# Patient Record
Sex: MALE | Race: WHITE | NOT HISPANIC OR LATINO | Employment: UNEMPLOYED | URBAN - METROPOLITAN AREA
[De-identification: names, ages, dates, MRNs, and addresses within clinical notes are randomized per-mention and may not be internally consistent; named-entity substitution may affect disease eponyms.]

---

## 2017-05-03 LAB
ALBUMIN SERPL-MCNC: 4.2 G/DL (ref 3.6–5.1)
ALBUMIN/GLOB SERPL: ABNORMAL {RATIO} (ref 1–2.1)
ALP SERPL-CCNC: 63 UNITS/L (ref 40–115)
ALT SERPL-CCNC: 28 UNITS/L (ref 9–60)
AST SERPL-CCNC: 30 UNITS/L (ref 10–35)
BASOPHILS # BLD AUTO: ABNORMAL 10*3/MM3 (ref 0–200)
BASOPHILS NFR BLD AUTO: 1 %
BILIRUB SERPL-MCNC: 0.6 MG/DL (ref 0.2–1.2)
BUN SERPL-MCNC: 14 MG/DL (ref 7–25)
BUN/CREAT SERPL: ABNORMAL (ref 6–22)
CALCIUM SERPL-MCNC: 9.7 MG/DL (ref 8.6–10.2)
CHLORIDE SERPL-SCNC: 99 MMOL/L (ref 98–110)
CHOLEST SERPL-MCNC: 164 MG/DL (ref 125–200)
CHOLEST/HDLC SERPL: ABNORMAL {RATIO}
CONV CO2: 28 MMOL/L (ref 21–33)
CONV NEUTROPHILS/100 LEUKOCYTES IN BODY FLUID BY MANUAL COUNT: 58 %
CONV TOTAL PROTEIN: 7.4 G/DL (ref 6.2–8.3)
CREAT UR-MCNC: 0.9 MG/DL (ref 0.76–1.46)
EOSINOPHIL # BLD AUTO: 4 %
EOSINOPHIL # BLD AUTO: ABNORMAL 10*3/MM3 (ref 15–500)
ERYTHROCYTE [DISTWIDTH] IN BLOOD BY AUTOMATED COUNT: 14.3 % (ref 11–15)
GLOBULIN UR ELPH-MCNC: ABNORMAL G/DL (ref 2.1–3.7)
GLUCOSE SERPL-MCNC: 96 MG/DL (ref 65–99)
HCT VFR BLD AUTO: 47.1 % (ref 38.5–50)
HDLC SERPL-MCNC: 40 MG/DL
HGB BLD-MCNC: 15.8 G/DL (ref 13.2–17.1)
LDLC SERPL CALC-MCNC: 104 MG/DL
LYMPHOCYTES # BLD AUTO: ABNORMAL 10*3/MM3 (ref 850–3900)
LYMPHOCYTES NFR BLD AUTO: 30 %
MCH RBC QN AUTO: 29.6 PG (ref 27–33)
MCHC RBC AUTO-ENTMCNC: ABNORMAL % (ref 32–36)
MCV RBC AUTO: 88.2 FL (ref 80–100)
MONOCYTES # BLD AUTO: ABNORMAL 10*3/MICROLITER (ref 200–950)
MONOCYTES NFR BLD AUTO: 6 %
NEUTROPHILS # BLD AUTO: ABNORMAL 10*3/MM3 (ref 1500–7800)
PLATELET # BLD AUTO: ABNORMAL 10*3/MM3 (ref 140–400)
PMV BLD AUTO: 8.6 FL (ref 7.5–11.5)
POTASSIUM SERPL-SCNC: 5 MMOL/L (ref 3.5–5.3)
PSA SERPL-MCNC: 0.3 NG/ML
RBC # BLD AUTO: ABNORMAL 10*6/MM3 (ref 4.2–5.8)
SODIUM SERPL-SCNC: 137 MMOL/L (ref 135–146)
TRIGL SERPL-MCNC: 102 MG/DL
TSH SERPL-ACNC: 1.37 MIU/L (ref 0.4–4.5)
VIT B12 SERPL-MCNC: 473 PG/ML (ref 200–1100)
WBC # BLD AUTO: ABNORMAL 10*3/MM3 (ref 3.8–10.8)

## 2018-01-03 ENCOUNTER — HOSPITAL ENCOUNTER (OUTPATIENT)
Dept: FAMILY MEDICINE CLINIC | Facility: CLINIC | Age: 53
Discharge: HOME | End: 2018-01-03
Attending: NURSE PRACTITIONER | Admitting: NURSE PRACTITIONER

## 2018-11-07 LAB
ALBUMIN SERPL-MCNC: 3.9 G/DL (ref 3.6–5.1)
ALBUMIN/GLOB SERPL: ABNORMAL {RATIO} (ref 1–2.1)
ALP SERPL-CCNC: 56 UNITS/L (ref 40–115)
ALT SERPL-CCNC: 25 UNITS/L (ref 9–60)
AST SERPL-CCNC: 30 UNITS/L (ref 10–35)
BASOPHILS # BLD AUTO: ABNORMAL 10*3/MM3 (ref 0–200)
BASOPHILS NFR BLD AUTO: 1 %
BILIRUB SERPL-MCNC: 0.4 MG/DL (ref 0.2–1.2)
BUN SERPL-MCNC: 10 MG/DL (ref 7–25)
BUN/CREAT SERPL: ABNORMAL (ref 6–22)
CALCIUM SERPL-MCNC: 9.4 MG/DL (ref 8.6–10.2)
CHLORIDE SERPL-SCNC: 103 MMOL/L (ref 98–110)
CHOLEST SERPL-MCNC: 150 MG/DL (ref 125–200)
CHOLEST/HDLC SERPL: ABNORMAL {RATIO}
CONV % HGB A1C: ABNORMAL %
CONV CO2: 28 MMOL/L (ref 21–33)
CONV NEUTROPHILS/100 LEUKOCYTES IN BODY FLUID BY MANUAL COUNT: 52 %
CONV TOTAL PROTEIN: 6.7 G/DL (ref 6.2–8.3)
CREAT UR-MCNC: 0.9 MG/DL (ref 0.76–1.46)
EOSINOPHIL # BLD AUTO: 6 %
EOSINOPHIL # BLD AUTO: ABNORMAL 10*3/MM3 (ref 15–500)
ERYTHROCYTE [DISTWIDTH] IN BLOOD BY AUTOMATED COUNT: 15 % (ref 11–15)
GLOBULIN UR ELPH-MCNC: ABNORMAL G/DL (ref 2.1–3.7)
GLUCOSE SERPL-MCNC: 91 MG/DL (ref 65–99)
HCT VFR BLD AUTO: 41.7 % (ref 38.5–50)
HDLC SERPL-MCNC: 41 MG/DL
HGB BLD-MCNC: 14.1 G/DL (ref 13.2–17.1)
LDLC SERPL CALC-MCNC: 94 MG/DL
LYMPHOCYTES # BLD AUTO: ABNORMAL 10*3/MM3 (ref 850–3900)
LYMPHOCYTES NFR BLD AUTO: 32 %
MCH RBC QN AUTO: 30.7 PG (ref 27–33)
MCHC RBC AUTO-ENTMCNC: ABNORMAL % (ref 32–36)
MCV RBC AUTO: 90.8 FL (ref 80–100)
MONOCYTES # BLD AUTO: ABNORMAL 10*3/MICROLITER (ref 200–950)
MONOCYTES NFR BLD AUTO: 9 %
NEUTROPHILS # BLD AUTO: ABNORMAL 10*3/MM3 (ref 1500–7800)
PLATELET # BLD AUTO: ABNORMAL 10*3/MM3 (ref 140–400)
PMV BLD AUTO: 8.4 FL (ref 7.5–11.5)
POTASSIUM SERPL-SCNC: 4.4 MMOL/L (ref 3.5–5.3)
PSA SERPL-MCNC: 0.2 NG/ML
RBC # BLD AUTO: ABNORMAL 10*6/MM3 (ref 4.2–5.8)
SODIUM SERPL-SCNC: 138 MMOL/L (ref 135–146)
TRIGL SERPL-MCNC: 75 MG/DL
WBC # BLD AUTO: ABNORMAL 10*3/MM3 (ref 3.8–10.8)

## 2019-02-06 ENCOUNTER — HOSPITAL ENCOUNTER (OUTPATIENT)
Dept: FAMILY MEDICINE CLINIC | Facility: CLINIC | Age: 54
Discharge: HOME | End: 2019-02-06
Attending: NURSE PRACTITIONER | Admitting: NURSE PRACTITIONER

## 2019-05-13 ENCOUNTER — CONVERSION ENCOUNTER (OUTPATIENT)
Dept: FAMILY MEDICINE CLINIC | Facility: CLINIC | Age: 54
End: 2019-05-13

## 2019-05-13 LAB
ALBUMIN SERPL-MCNC: 4 G/DL (ref 3.6–5.1)
ALBUMIN/GLOB SERPL: ABNORMAL {RATIO} (ref 1–2.5)
ALP SERPL-CCNC: 58 UNITS/L (ref 40–115)
ALT SERPL-CCNC: 20 UNITS/L (ref 9–46)
AST SERPL-CCNC: 21 UNITS/L (ref 10–35)
BASOPHILS # BLD AUTO: ABNORMAL 10*3/MM3 (ref 0–200)
BASOPHILS NFR BLD AUTO: 1.2 %
BILIRUB SERPL-MCNC: 0.6 MG/DL (ref 0.2–1.2)
BUN SERPL-MCNC: 9 MG/DL (ref 7–25)
BUN/CREAT SERPL: ABNORMAL (ref 6–22)
CALCIUM SERPL-MCNC: 9.3 MG/DL (ref 8.6–10.3)
CHLORIDE SERPL-SCNC: 97 MMOL/L (ref 98–110)
CHOLEST SERPL-MCNC: 156 MG/DL
CHOLEST/HDLC SERPL: ABNORMAL {RATIO}
CO2 CONTENT VENOUS: 28 MMOL/L (ref 20–32)
CONV NEUTROPHILS/100 LEUKOCYTES IN BODY FLUID BY MANUAL COUNT: 57.2 %
CONV TOTAL PROTEIN: 6.8 G/DL (ref 6.1–8.1)
CREAT UR-MCNC: 0.69 MG/DL (ref 0.7–1.33)
EOSINOPHIL # BLD AUTO: 5.9 %
EOSINOPHIL # BLD AUTO: ABNORMAL 10*3/MM3 (ref 15–500)
ERYTHROCYTE [DISTWIDTH] IN BLOOD BY AUTOMATED COUNT: 14 % (ref 11–15)
GLOBULIN UR ELPH-MCNC: ABNORMAL G/DL (ref 1.9–3.7)
GLUCOSE SERPL-MCNC: 87 MG/DL (ref 65–99)
HCT VFR BLD AUTO: 41.7 % (ref 38.5–50)
HDLC SERPL-MCNC: 44 MG/DL
HGB BLD-MCNC: 14.4 G/DL (ref 13.2–17.1)
LDLC SERPL CALC-MCNC: ABNORMAL MG/DL
LYMPHOCYTES # BLD AUTO: ABNORMAL 10*3/MM3 (ref 850–3900)
LYMPHOCYTES NFR BLD AUTO: 29.4 %
MCH RBC QN AUTO: 29.8 PG (ref 27–33)
MCHC RBC AUTO-ENTMCNC: ABNORMAL % (ref 32–36)
MCV RBC AUTO: 86.3 FL (ref 80–100)
MONOCYTES # BLD AUTO: ABNORMAL 10*3/MICROLITER (ref 200–950)
MONOCYTES NFR BLD AUTO: 6.3 %
NEUTROPHILS # BLD AUTO: ABNORMAL 10*3/MM3 (ref 1500–7800)
PLATELET # BLD AUTO: ABNORMAL 10*3/MM3 (ref 140–400)
PMV BLD AUTO: 9.8 FL (ref 7.5–12.5)
POTASSIUM SERPL-SCNC: 4.1 MMOL/L (ref 3.5–5.3)
RBC # BLD AUTO: ABNORMAL 10*6/MM3 (ref 4.2–5.8)
SODIUM SERPL-SCNC: 132 MMOL/L (ref 135–146)
TRIGL SERPL-MCNC: 84 MG/DL
WBC # BLD AUTO: ABNORMAL K/UL (ref 3.8–10.8)

## 2019-06-27 RX ORDER — PSEUDOEPHEDRINE HCL 120 MG/1
120 TABLET, FILM COATED, EXTENDED RELEASE ORAL EVERY 12 HOURS
Qty: 60 TABLET | Refills: 2 | Status: SHIPPED | OUTPATIENT
Start: 2019-06-27

## 2019-07-10 RX ORDER — LISINOPRIL AND HYDROCHLOROTHIAZIDE 20; 12.5 MG/1; MG/1
1 TABLET ORAL DAILY
COMMUNITY
Start: 2018-04-05 | End: 2019-07-10 | Stop reason: SDUPTHER

## 2019-07-10 RX ORDER — LISINOPRIL AND HYDROCHLOROTHIAZIDE 20; 12.5 MG/1; MG/1
1 TABLET ORAL DAILY
Qty: 30 TABLET | Refills: 2 | Status: SHIPPED | OUTPATIENT
Start: 2019-07-10 | End: 2019-10-30 | Stop reason: SDUPTHER

## 2019-07-10 RX ORDER — ESCITALOPRAM OXALATE 20 MG/1
1 TABLET ORAL DAILY
COMMUNITY
Start: 2018-06-25 | End: 2019-07-10 | Stop reason: SDUPTHER

## 2019-07-10 RX ORDER — ESCITALOPRAM OXALATE 20 MG/1
20 TABLET ORAL DAILY
Qty: 30 TABLET | Refills: 2 | Status: SHIPPED | OUTPATIENT
Start: 2019-07-10 | End: 2019-10-30 | Stop reason: SDUPTHER

## 2019-10-30 RX ORDER — ESCITALOPRAM OXALATE 20 MG/1
TABLET ORAL
Qty: 90 TABLET | Refills: 1 | Status: SHIPPED | OUTPATIENT
Start: 2019-10-30

## 2019-10-30 RX ORDER — LISINOPRIL AND HYDROCHLOROTHIAZIDE 20; 12.5 MG/1; MG/1
TABLET ORAL
Qty: 90 TABLET | Refills: 1 | Status: SHIPPED | OUTPATIENT
Start: 2019-10-30

## 2021-10-01 ENCOUNTER — TRANSCRIBE ORDERS (OUTPATIENT)
Dept: ADMINISTRATIVE | Facility: HOSPITAL | Age: 56
End: 2021-10-01

## 2021-10-01 DIAGNOSIS — R06.02 SHORTNESS OF BREATH: Primary | ICD-10-CM

## 2021-10-08 ENCOUNTER — TRANSCRIBE ORDERS (OUTPATIENT)
Dept: ADMINISTRATIVE | Facility: HOSPITAL | Age: 56
End: 2021-10-08

## 2021-10-08 DIAGNOSIS — Z01.818 OTHER SPECIFIED PRE-OPERATIVE EXAMINATION: Primary | ICD-10-CM

## 2021-10-30 ENCOUNTER — APPOINTMENT (OUTPATIENT)
Dept: RESPIRATORY THERAPY | Facility: HOSPITAL | Age: 56
End: 2021-10-30

## 2021-11-18 ENCOUNTER — APPOINTMENT (OUTPATIENT)
Dept: RESPIRATORY THERAPY | Facility: HOSPITAL | Age: 56
End: 2021-11-18

## 2021-12-07 ENCOUNTER — HOSPITAL ENCOUNTER (OUTPATIENT)
Dept: RESPIRATORY THERAPY | Facility: HOSPITAL | Age: 56
End: 2021-12-07

## 2022-06-30 ENCOUNTER — TRANSCRIBE ORDERS (OUTPATIENT)
Dept: PULMONOLOGY | Facility: HOSPITAL | Age: 57
End: 2022-06-30

## 2022-06-30 DIAGNOSIS — R06.00 DYSPNEA, UNSPECIFIED TYPE: Primary | ICD-10-CM

## 2022-08-22 ENCOUNTER — LAB (OUTPATIENT)
Dept: LAB | Facility: HOSPITAL | Age: 57
End: 2022-08-22

## 2022-08-22 DIAGNOSIS — Z01.818 OTHER SPECIFIED PRE-OPERATIVE EXAMINATION: ICD-10-CM

## 2022-08-22 PROCEDURE — 0202U NFCT DS 22 TRGT SARS-COV-2: CPT

## 2022-08-22 PROCEDURE — C9803 HOPD COVID-19 SPEC COLLECT: HCPCS

## 2022-08-24 ENCOUNTER — HOSPITAL ENCOUNTER (OUTPATIENT)
Dept: RESPIRATORY THERAPY | Facility: HOSPITAL | Age: 57
Discharge: HOME | End: 2022-08-24
Admitting: INTERNAL MEDICINE

## 2022-08-24 DIAGNOSIS — R06.00 DYSPNEA, UNSPECIFIED TYPE: ICD-10-CM

## 2022-08-24 PROCEDURE — 94729 DIFFUSING CAPACITY: CPT

## 2022-08-24 PROCEDURE — 94618 PULMONARY STRESS TESTING: CPT

## 2022-08-24 PROCEDURE — 94727 GAS DIL/WSHOT DETER LNG VOL: CPT

## 2022-08-24 PROCEDURE — 94060 EVALUATION OF WHEEZING: CPT

## 2022-08-24 RX ORDER — ALBUTEROL SULFATE 90 UG/1
2 AEROSOL, METERED RESPIRATORY (INHALATION) ONCE
Status: COMPLETED | OUTPATIENT
Start: 2022-08-24 | End: 2022-08-24

## 2022-08-24 RX ADMIN — ALBUTEROL SULFATE 2 PUFF: 108 INHALANT RESPIRATORY (INHALATION) at 08:21

## 2022-08-24 NOTE — NURSING NOTE
Exercise Oximetry    Patient Name:Chu Peralta   MRN: [id number]   Date: 08/24/22             ROOM AIR BASELINE   SpO2% 97   Heart Rate 76   Blood Pressure      EXERCISE ON ROOM AIR SpO2% EXERCISE ON O2 @  LPM SpO2%   1 MINUTE 96 1 MINUTE    2 MINUTES 95 2 MINUTES    3 MINUTES 94 3 MINUTES    4 MINUTES 95 4 MINUTES    5 MINUTES 95 5 MINUTES    6 MINUTES 94 6 MINUTES               Distance Walked   Distance Walked   Dyspnea (Ita Scale)   Dyspnea (Ita Scale)   Fatigue (Ita Scale)   Fatigue (Ita Scale)   SpO2% Post Exercise  96 SpO2% Post Exercise   HR Post Exercise  72 HR Post Exercise   Time to Recovery  ONE MINUTE Time to Recovery     Comments: PATIENT WALKED ON ROOM AIR, O2 SATS REMAINED 94% AND ABOVE THRU-OUT THE WALK

## 2023-01-20 ENCOUNTER — TRANSCRIBE ORDERS (OUTPATIENT)
Dept: ADMINISTRATIVE | Facility: HOSPITAL | Age: 58
End: 2023-01-20
Payer: MEDICAID

## 2023-01-20 DIAGNOSIS — Z02.71 ENCOUNTER FOR DISABILITY DETERMINATION: Primary | ICD-10-CM

## 2023-03-09 ENCOUNTER — HOSPITAL ENCOUNTER (OUTPATIENT)
Dept: RESPIRATORY THERAPY | Facility: HOSPITAL | Age: 58
Discharge: HOME | End: 2023-03-09

## 2023-03-09 VITALS — OXYGEN SATURATION: 96 % | RESPIRATION RATE: 12 BRPM | HEART RATE: 86 BPM

## 2023-03-09 DIAGNOSIS — Z02.71 ENCOUNTER FOR DISABILITY DETERMINATION: ICD-10-CM

## 2023-03-09 PROCEDURE — 94060 EVALUATION OF WHEEZING: CPT

## 2023-03-09 RX ORDER — ALBUTEROL SULFATE 90 UG/1
2 AEROSOL, METERED RESPIRATORY (INHALATION) ONCE
Status: COMPLETED | OUTPATIENT
Start: 2023-03-09 | End: 2023-03-09

## 2023-03-09 RX ADMIN — ALBUTEROL SULFATE 2 PUFF: 108 INHALANT RESPIRATORY (INHALATION) at 13:10

## 2023-09-12 ENCOUNTER — TRANSCRIBE ORDERS (OUTPATIENT)
Dept: ADMINISTRATIVE | Facility: HOSPITAL | Age: 58
End: 2023-09-12
Payer: MEDICAID

## 2023-09-12 DIAGNOSIS — Z02.71 ENCOUNTER FOR DISABILITY DETERMINATION: Primary | ICD-10-CM

## 2023-09-26 ENCOUNTER — HOSPITAL ENCOUNTER (OUTPATIENT)
Dept: RESPIRATORY THERAPY | Facility: HOSPITAL | Age: 58
Discharge: HOME | End: 2023-09-26

## 2023-09-26 ENCOUNTER — TRANSCRIBE ORDERS (OUTPATIENT)
Dept: ADMINISTRATIVE | Facility: HOSPITAL | Age: 58
End: 2023-09-26
Payer: MEDICAID

## 2023-09-26 VITALS — HEART RATE: 78 BPM | OXYGEN SATURATION: 97 % | RESPIRATION RATE: 16 BRPM

## 2023-09-26 DIAGNOSIS — Z02.71 ENCOUNTER FOR DISABILITY DETERMINATION: Primary | ICD-10-CM

## 2023-09-26 DIAGNOSIS — Z02.71 ENCOUNTER FOR DISABILITY DETERMINATION: ICD-10-CM

## 2023-09-26 LAB
ARTERIAL PATENCY WRIST A: POSITIVE
ATMOSPHERIC PRESS: ABNORMAL MM[HG]
BASE EXCESS BLDA CALC-SCNC: 1.5 MMOL/L (ref 0–3)
BDY SITE: ABNORMAL
CO2 BLDA-SCNC: 27.3 MMOL/L (ref 22–29)
HCO3 BLDA-SCNC: 26.1 MMOL/L (ref 21–28)
HEMODILUTION: NO
MODALITY: ABNORMAL
PCO2 BLDA: 40.2 MM HG (ref 35–48)
PH BLDA: 7.42 PH UNITS (ref 7.35–7.45)
PO2 BLDA: 71.8 MM HG (ref 83–108)
SAO2 % BLDCOA: 94.5 % (ref 94–98)

## 2023-09-26 PROCEDURE — 82803 BLOOD GASES ANY COMBINATION: CPT

## 2023-09-26 PROCEDURE — 94760 N-INVAS EAR/PLS OXIMETRY 1: CPT

## 2023-09-26 PROCEDURE — 94729 DIFFUSING CAPACITY: CPT

## 2023-09-26 PROCEDURE — 36600 WITHDRAWAL OF ARTERIAL BLOOD: CPT

## 2023-09-26 PROCEDURE — 94060 EVALUATION OF WHEEZING: CPT

## 2023-09-26 PROCEDURE — 94664 DEMO&/EVAL PT USE INHALER: CPT

## 2023-09-26 PROCEDURE — 94799 UNLISTED PULMONARY SVC/PX: CPT

## 2023-09-26 RX ORDER — ALBUTEROL SULFATE 90 UG/1
2 AEROSOL, METERED RESPIRATORY (INHALATION) ONCE
Status: COMPLETED | OUTPATIENT
Start: 2023-09-26 | End: 2023-09-26

## 2023-09-26 RX ADMIN — ALBUTEROL SULFATE 2 PUFF: 108 AEROSOL, METERED RESPIRATORY (INHALATION) at 13:59

## 2025-02-03 ENCOUNTER — HOSPITAL ENCOUNTER (INPATIENT)
Facility: HOSPITAL | Age: 60
LOS: 7 days | Discharge: HOME HEALTH | DRG: 321 | End: 2025-02-10
Attending: EMERGENCY MEDICINE | Admitting: EMERGENCY MEDICINE
Payer: MEDICARE

## 2025-02-03 ENCOUNTER — APPOINTMENT (OUTPATIENT)
Dept: GENERAL RADIOLOGY | Facility: HOSPITAL | Age: 60
DRG: 321 | End: 2025-02-03
Payer: MEDICARE

## 2025-02-03 ENCOUNTER — APPOINTMENT (OUTPATIENT)
Dept: CARDIOLOGY | Facility: HOSPITAL | Age: 60
DRG: 321 | End: 2025-02-03
Payer: MEDICARE

## 2025-02-03 DIAGNOSIS — J96.22 ACUTE ON CHRONIC RESPIRATORY FAILURE WITH HYPOXIA AND HYPERCAPNIA: Primary | ICD-10-CM

## 2025-02-03 DIAGNOSIS — J96.21 ACUTE ON CHRONIC RESPIRATORY FAILURE WITH HYPOXIA AND HYPERCAPNIA: Primary | ICD-10-CM

## 2025-02-03 PROBLEM — J96.20 ACUTE ON CHRONIC RESPIRATORY FAILURE: Status: ACTIVE | Noted: 2025-02-03

## 2025-02-03 PROBLEM — I21.11 STEMI INVOLVING RIGHT CORONARY ARTERY: Status: ACTIVE | Noted: 2025-02-03

## 2025-02-03 LAB
ACT BLD: 164 SECONDS (ref 89–137)
ACT BLD: 176 SECONDS (ref 89–137)
ACT BLD: 256 SECONDS (ref 89–137)
ALBUMIN SERPL-MCNC: 4 G/DL (ref 3.5–5.2)
ALBUMIN/GLOB SERPL: 1.4 G/DL
ALP SERPL-CCNC: 66 U/L (ref 39–117)
ALT SERPL W P-5'-P-CCNC: 30 U/L (ref 1–41)
AMPHET+METHAMPHET UR QL: NEGATIVE
AMPHETAMINES UR QL: NEGATIVE
AMYLASE SERPL-CCNC: 64 U/L (ref 28–100)
ANION GAP SERPL CALCULATED.3IONS-SCNC: 8.6 MMOL/L (ref 5–15)
AORTIC DIMENSIONLESS INDEX: 0.72 (DI)
APTT PPP: >200 SECONDS (ref 22.7–35.4)
ARTERIAL PATENCY WRIST A: ABNORMAL
ARTERIAL PATENCY WRIST A: POSITIVE
AST SERPL-CCNC: 84 U/L (ref 1–40)
ATMOSPHERIC PRESS: ABNORMAL MM[HG]
AV MEAN PRESS GRAD SYS DOP V1V2: 2 MMHG
AV VMAX SYS DOP: 88.2 CM/SEC
B PARAPERT DNA SPEC QL NAA+PROBE: NOT DETECTED
B PERT DNA SPEC QL NAA+PROBE: NOT DETECTED
BACTERIA UR QL AUTO: ABNORMAL /HPF
BARBITURATES UR QL SCN: NEGATIVE
BASE EXCESS BLDA CALC-SCNC: -3.6 MMOL/L (ref 0–3)
BASE EXCESS BLDA CALC-SCNC: -5.4 MMOL/L (ref 0–3)
BASE EXCESS BLDA CALC-SCNC: -5.7 MMOL/L (ref 0–3)
BASE EXCESS BLDA CALC-SCNC: -5.8 MMOL/L (ref 0–3)
BASOPHILS # BLD AUTO: 0.07 10*3/MM3 (ref 0–0.2)
BASOPHILS NFR BLD AUTO: 0.3 % (ref 0–1.5)
BDY SITE: ABNORMAL
BENZODIAZ UR QL SCN: NEGATIVE
BH CV ECHO MEAS - ACS: 2.3 CM
BH CV ECHO MEAS - AI P1/2T: 374.7 MSEC
BH CV ECHO MEAS - AO MAX PG: 3.1 MMHG
BH CV ECHO MEAS - AO V2 VTI: 13.7 CM
BH CV ECHO MEAS - AVA(I,D): 1.75 CM2
BH CV ECHO MEAS - EDV(CUBED): 103.8 ML
BH CV ECHO MEAS - EDV(MOD-SP4): 124 ML
BH CV ECHO MEAS - EF(MOD-SP4): 27.3 %
BH CV ECHO MEAS - ESV(CUBED): 68.9 ML
BH CV ECHO MEAS - ESV(MOD-SP4): 90.1 ML
BH CV ECHO MEAS - FS: 12.8 %
BH CV ECHO MEAS - IVS/LVPW: 1 CM
BH CV ECHO MEAS - IVSD: 1 CM
BH CV ECHO MEAS - LA DIMENSION: 3.3 CM
BH CV ECHO MEAS - LAT PEAK E' VEL: 5.2 CM/SEC
BH CV ECHO MEAS - LV MASS(C)D: 164.5 GRAMS
BH CV ECHO MEAS - LV MAX PG: 1.34 MMHG
BH CV ECHO MEAS - LV MEAN PG: 1 MMHG
BH CV ECHO MEAS - LV V1 MAX: 57.9 CM/SEC
BH CV ECHO MEAS - LV V1 VTI: 8.5 CM
BH CV ECHO MEAS - LVIDD: 4.7 CM
BH CV ECHO MEAS - LVIDS: 4.1 CM
BH CV ECHO MEAS - LVOT AREA: 2.8 CM2
BH CV ECHO MEAS - LVOT DIAM: 1.9 CM
BH CV ECHO MEAS - LVPWD: 1 CM
BH CV ECHO MEAS - MED PEAK E' VEL: 5.3 CM/SEC
BH CV ECHO MEAS - MR MAX PG: 18.5 MMHG
BH CV ECHO MEAS - MR MAX VEL: 215 CM/SEC
BH CV ECHO MEAS - MV A MAX VEL: 62 CM/SEC
BH CV ECHO MEAS - MV DEC SLOPE: 1020 CM/SEC2
BH CV ECHO MEAS - MV DEC TIME: 0.14 SEC
BH CV ECHO MEAS - MV E MAX VEL: 42.3 CM/SEC
BH CV ECHO MEAS - MV E/A: 0.68
BH CV ECHO MEAS - MV MAX PG: 1.54 MMHG
BH CV ECHO MEAS - MV MEAN PG: 1 MMHG
BH CV ECHO MEAS - MV P1/2T: 16.1 MSEC
BH CV ECHO MEAS - MV V2 VTI: 8.2 CM
BH CV ECHO MEAS - MVA(P1/2T): 13.7 CM2
BH CV ECHO MEAS - MVA(VTI): 2.9 CM2
BH CV ECHO MEAS - PA ACC TIME: 0.12 SEC
BH CV ECHO MEAS - PA V2 MAX: 57.6 CM/SEC
BH CV ECHO MEAS - RAP SYSTOLE: 3 MMHG
BH CV ECHO MEAS - RV MAX PG: 0.58 MMHG
BH CV ECHO MEAS - RV V1 MAX: 38.1 CM/SEC
BH CV ECHO MEAS - RV V1 VTI: 4.6 CM
BH CV ECHO MEAS - RVSP: 30.2 MMHG
BH CV ECHO MEAS - SV(LVOT): 24 ML
BH CV ECHO MEAS - SV(MOD-SP4): 33.9 ML
BH CV ECHO MEAS - TAPSE (>1.6): 1.56 CM
BH CV ECHO MEAS - TR MAX PG: 27.2 MMHG
BH CV ECHO MEAS - TR MAX VEL: 261 CM/SEC
BH CV ECHO MEASUREMENTS AVERAGE E/E' RATIO: 8.06
BH CV XLRA - TDI S': 12.3 CM/SEC
BILIRUB SERPL-MCNC: 0.2 MG/DL (ref 0–1.2)
BILIRUB UR QL STRIP: NEGATIVE
BUN SERPL-MCNC: 11 MG/DL (ref 8–23)
BUN/CREAT SERPL: 12.1 (ref 7–25)
BUPRENORPHINE SERPL-MCNC: NEGATIVE NG/ML
C PNEUM DNA NPH QL NAA+NON-PROBE: NOT DETECTED
CA-I BLDA-SCNC: 1.15 MMOL/L (ref 1.15–1.33)
CA-I BLDA-SCNC: 1.26 MMOL/L (ref 1.15–1.33)
CA-I SERPL ISE-MCNC: 1.14 MMOL/L (ref 1.15–1.3)
CALCIUM SPEC-SCNC: 8.7 MG/DL (ref 8.6–10.5)
CANNABINOIDS SERPL QL: NEGATIVE
CHLORIDE SERPL-SCNC: 101 MMOL/L (ref 98–107)
CHOLEST SERPL-MCNC: 111 MG/DL (ref 0–200)
CK SERPL-CCNC: 1076 U/L (ref 20–200)
CLARITY UR: CLEAR
CO2 BLDA-SCNC: 27.2 MMOL/L (ref 22–29)
CO2 BLDA-SCNC: 27.9 MMOL/L (ref 22–29)
CO2 SERPL-SCNC: 25.4 MMOL/L (ref 22–29)
COCAINE UR QL: NEGATIVE
COLOR UR: YELLOW
CREAT BLDA-MCNC: 0.89 MG/DL (ref 0.6–1.3)
CREAT BLDA-MCNC: 1.19 MG/DL (ref 0.6–1.3)
CREAT SERPL-MCNC: 0.91 MG/DL (ref 0.76–1.27)
D-LACTATE SERPL-SCNC: 1.4 MMOL/L (ref 0.2–2)
D-LACTATE SERPL-SCNC: 1.9 MMOL/L (ref 0.2–2)
D-LACTATE SERPL-SCNC: 1.9 MMOL/L (ref 0.5–2)
DEPRECATED RDW RBC AUTO: 49.1 FL (ref 37–54)
EGFRCR SERPLBLD CKD-EPI 2021: 69.5 ML/MIN/1.73
EGFRCR SERPLBLD CKD-EPI 2021: 95.9 ML/MIN/1.73
EGFRCR SERPLBLD CKD-EPI 2021: 97.5 ML/MIN/1.73
EOSINOPHIL # BLD AUTO: 0.01 10*3/MM3 (ref 0–0.4)
EOSINOPHIL NFR BLD AUTO: 0 % (ref 0.3–6.2)
ERYTHROCYTE [DISTWIDTH] IN BLOOD BY AUTOMATED COUNT: 14.4 % (ref 12.3–15.4)
FLUAV H1 2009 PAND RNA NPH QL NAA+PROBE: DETECTED
FLUBV RNA ISLT QL NAA+PROBE: NOT DETECTED
GLOBULIN UR ELPH-MCNC: 2.8 GM/DL
GLUCOSE BLDC GLUCOMTR-MCNC: 186 MG/DL (ref 74–100)
GLUCOSE BLDC GLUCOMTR-MCNC: 186 MG/DL (ref 74–100)
GLUCOSE BLDC GLUCOMTR-MCNC: 200 MG/DL (ref 74–100)
GLUCOSE BLDC GLUCOMTR-MCNC: 213 MG/DL (ref 74–100)
GLUCOSE BLDC GLUCOMTR-MCNC: 213 MG/DL (ref 74–100)
GLUCOSE SERPL-MCNC: 186 MG/DL (ref 65–99)
GLUCOSE UR STRIP-MCNC: ABNORMAL MG/DL
HADV DNA SPEC NAA+PROBE: NOT DETECTED
HBA1C MFR BLD: 6.06 % (ref 4.8–5.6)
HCO3 BLDA-SCNC: 25 MMOL/L (ref 21–28)
HCO3 BLDA-SCNC: 25.2 MMOL/L (ref 21–28)
HCO3 BLDA-SCNC: 25.5 MMOL/L (ref 21–28)
HCO3 BLDA-SCNC: 27.3 MMOL/L (ref 21–28)
HCOV 229E RNA SPEC QL NAA+PROBE: NOT DETECTED
HCOV HKU1 RNA SPEC QL NAA+PROBE: NOT DETECTED
HCOV NL63 RNA SPEC QL NAA+PROBE: NOT DETECTED
HCOV OC43 RNA SPEC QL NAA+PROBE: NOT DETECTED
HCT VFR BLD AUTO: 40.6 % (ref 37.5–51)
HCT VFR BLDA CALC: 39 % (ref 38–51)
HCT VFR BLDA CALC: 41 % (ref 38–51)
HDLC SERPL-MCNC: 35 MG/DL (ref 40–60)
HEMODILUTION: NO
HGB BLD-MCNC: 12.6 G/DL (ref 13–17.7)
HGB BLDA-MCNC: 13.2 G/DL (ref 12–17)
HGB BLDA-MCNC: 13.9 G/DL (ref 12–17)
HGB UR QL STRIP.AUTO: ABNORMAL
HMPV RNA NPH QL NAA+NON-PROBE: NOT DETECTED
HPIV1 RNA ISLT QL NAA+PROBE: NOT DETECTED
HPIV2 RNA SPEC QL NAA+PROBE: NOT DETECTED
HPIV3 RNA NPH QL NAA+PROBE: NOT DETECTED
HPIV4 P GENE NPH QL NAA+PROBE: NOT DETECTED
HYALINE CASTS UR QL AUTO: ABNORMAL /LPF
IMM GRANULOCYTES # BLD AUTO: 0.3 10*3/MM3 (ref 0–0.05)
IMM GRANULOCYTES NFR BLD AUTO: 1.2 % (ref 0–0.5)
INHALED O2 CONCENTRATION: 50 %
INR PPP: 1.21 (ref 0.9–1.1)
KETONES UR QL STRIP: NEGATIVE
L PNEUMO1 AG UR QL IA: NEGATIVE
LDLC SERPL CALC-MCNC: 64 MG/DL (ref 0–100)
LDLC/HDLC SERPL: 1.89 {RATIO}
LEUKOCYTE ESTERASE UR QL STRIP.AUTO: NEGATIVE
LIPASE SERPL-CCNC: 20 U/L (ref 13–60)
LV EF BIPLANE MOD: 27 %
LYMPHOCYTES # BLD AUTO: 0.4 10*3/MM3 (ref 0.7–3.1)
LYMPHOCYTES NFR BLD AUTO: 1.6 % (ref 19.6–45.3)
Lab: ABNORMAL
M PNEUMO IGG SER IA-ACNC: NOT DETECTED
MAGNESIUM SERPL-MCNC: 2.3 MG/DL (ref 1.6–2.4)
MCH RBC QN AUTO: 28.8 PG (ref 26.6–33)
MCHC RBC AUTO-ENTMCNC: 31 G/DL (ref 31.5–35.7)
MCV RBC AUTO: 92.7 FL (ref 79–97)
METHADONE UR QL SCN: NEGATIVE
MODALITY: ABNORMAL
MONOCYTES # BLD AUTO: 1.17 10*3/MM3 (ref 0.1–0.9)
MONOCYTES NFR BLD AUTO: 4.6 % (ref 5–12)
MRSA DNA SPEC QL NAA+PROBE: NORMAL
NEUTROPHILS NFR BLD AUTO: 23.69 10*3/MM3 (ref 1.7–7)
NEUTROPHILS NFR BLD AUTO: 92.3 % (ref 42.7–76)
NITRITE UR QL STRIP: NEGATIVE
NOTIFIED WHO: ABNORMAL
NRBC BLD AUTO-RTO: 0 /100 WBC (ref 0–0.2)
NT-PROBNP SERPL-MCNC: 771 PG/ML (ref 0–900)
OPIATES UR QL: NEGATIVE
OXYCODONE UR QL SCN: NEGATIVE
PCO2 BLDA: 71.3 MM HG (ref 35–48)
PCO2 BLDA: 78 MM HG (ref 35–48)
PCO2 BLDA: 78.1 MM HG (ref 35–48)
PCO2 BLDA: 78.8 MM HG (ref 35–48)
PCP UR QL SCN: NEGATIVE
PEEP RESPIRATORY: 5 CM[H2O]
PH BLDA: 7.12 PH UNITS (ref 7.35–7.45)
PH BLDA: 7.12 PH UNITS (ref 7.35–7.45)
PH BLDA: 7.15 PH UNITS (ref 7.35–7.45)
PH BLDA: 7.15 PH UNITS (ref 7.35–7.45)
PH UR STRIP.AUTO: 6.5 [PH] (ref 5–8)
PHOSPHATE SERPL-MCNC: 5.1 MG/DL (ref 2.5–4.5)
PLATELET # BLD AUTO: 336 10*3/MM3 (ref 140–450)
PMV BLD AUTO: 8.9 FL (ref 6–12)
PO2 BLD: 149 MM[HG] (ref 0–500)
PO2 BLD: 200 MM[HG] (ref 0–500)
PO2 BLD: 329 MM[HG] (ref 0–500)
PO2 BLD: 346 MM[HG] (ref 0–500)
PO2 BLDA: 100 MM HG (ref 83–108)
PO2 BLDA: 164.5 MM HG (ref 83–108)
PO2 BLDA: 172.9 MM HG (ref 83–108)
PO2 BLDA: 74.5 MM HG (ref 83–108)
POTASSIUM BLDA-SCNC: 5 MMOL/L (ref 3.5–4.5)
POTASSIUM BLDA-SCNC: 5.6 MMOL/L (ref 3.5–4.5)
POTASSIUM SERPL-SCNC: 5.1 MMOL/L (ref 3.5–5.2)
POTASSIUM SERPL-SCNC: 5.7 MMOL/L (ref 3.5–5.2)
PROCALCITONIN SERPL-MCNC: 37.4 NG/ML (ref 0–0.25)
PROT SERPL-MCNC: 6.8 G/DL (ref 6–8.5)
PROT UR QL STRIP: ABNORMAL
PROTHROMBIN TIME: 15.3 SECONDS (ref 11.7–14.2)
RBC # BLD AUTO: 4.38 10*6/MM3 (ref 4.14–5.8)
RBC # UR STRIP: ABNORMAL /HPF
READ BACK: ABNORMAL
REF LAB TEST METHOD: ABNORMAL
RESPIRATORY RATE: 20
RESPIRATORY RATE: 24
RESPIRATORY RATE: 28
RESPIRATORY RATE: 28
RHINOVIRUS RNA SPEC NAA+PROBE: NOT DETECTED
RSV RNA NPH QL NAA+NON-PROBE: NOT DETECTED
S PNEUM AG SPEC QL LA: NEGATIVE
SAO2 % BLDCOA: 88 % (ref 94–98)
SAO2 % BLDCOA: 95.1 % (ref 94–98)
SAO2 % BLDCOA: 98.8 % (ref 94–98)
SAO2 % BLDCOA: 98.9 % (ref 94–98)
SARS-COV-2 RNA RESP QL NAA+PROBE: NOT DETECTED
SINUS: 3.7 CM
SODIUM BLD-SCNC: 139 MMOL/L (ref 138–146)
SODIUM BLD-SCNC: 139 MMOL/L (ref 138–146)
SODIUM SERPL-SCNC: 135 MMOL/L (ref 136–145)
SP GR UR STRIP: 1.02 (ref 1–1.03)
SQUAMOUS #/AREA URNS HPF: ABNORMAL /HPF
TRICYCLICS UR QL SCN: NEGATIVE
TRIGL SERPL-MCNC: 49 MG/DL (ref 0–150)
TROPONIN T SERPL HS-MCNC: 1288 NG/L
TSH SERPL DL<=0.05 MIU/L-ACNC: 0.29 UIU/ML (ref 0.27–4.2)
UROBILINOGEN UR QL STRIP: ABNORMAL
VENTILATOR MODE: AC
VLDLC SERPL-MCNC: 12 MG/DL (ref 5–40)
VT ON VENT VENT: 500 ML
WBC # UR STRIP: ABNORMAL /HPF
WBC NRBC COR # BLD AUTO: 25.64 10*3/MM3 (ref 3.4–10.8)

## 2025-02-03 PROCEDURE — 99153 MOD SED SAME PHYS/QHP EA: CPT | Performed by: INTERNAL MEDICINE

## 2025-02-03 PROCEDURE — 85610 PROTHROMBIN TIME: CPT | Performed by: NURSE PRACTITIONER

## 2025-02-03 PROCEDURE — 25010000002 HEPARIN (PORCINE) 25000-0.45 UT/250ML-% SOLUTION: Performed by: NURSE PRACTITIONER

## 2025-02-03 PROCEDURE — 83690 ASSAY OF LIPASE: CPT | Performed by: NURSE PRACTITIONER

## 2025-02-03 PROCEDURE — 84443 ASSAY THYROID STIM HORMONE: CPT | Performed by: NURSE PRACTITIONER

## 2025-02-03 PROCEDURE — 5A1945Z RESPIRATORY VENTILATION, 24-96 CONSECUTIVE HOURS: ICD-10-PCS | Performed by: EMERGENCY MEDICINE

## 2025-02-03 PROCEDURE — 25010000002 HEPARIN (PORCINE) PER 1000 UNITS: Performed by: INTERNAL MEDICINE

## 2025-02-03 PROCEDURE — 85025 COMPLETE CBC W/AUTO DIFF WBC: CPT | Performed by: NURSE PRACTITIONER

## 2025-02-03 PROCEDURE — 80306 DRUG TEST PRSMV INSTRMNT: CPT | Performed by: NURSE PRACTITIONER

## 2025-02-03 PROCEDURE — G0278 ILIAC ART ANGIO,CARDIAC CATH: HCPCS | Performed by: INTERNAL MEDICINE

## 2025-02-03 PROCEDURE — 94761 N-INVAS EAR/PLS OXIMETRY MLT: CPT

## 2025-02-03 PROCEDURE — 84484 ASSAY OF TROPONIN QUANT: CPT | Performed by: NURSE PRACTITIONER

## 2025-02-03 PROCEDURE — B2151ZZ FLUOROSCOPY OF LEFT HEART USING LOW OSMOLAR CONTRAST: ICD-10-PCS | Performed by: INTERNAL MEDICINE

## 2025-02-03 PROCEDURE — 36600 WITHDRAWAL OF ARTERIAL BLOOD: CPT

## 2025-02-03 PROCEDURE — 25810000003 SODIUM CHLORIDE 0.9 % SOLUTION 250 ML FLEX CONT: Performed by: NURSE PRACTITIONER

## 2025-02-03 PROCEDURE — 0202U NFCT DS 22 TRGT SARS-COV-2: CPT | Performed by: NURSE PRACTITIONER

## 2025-02-03 PROCEDURE — C9606 PERC D-E COR REVASC W AMI S: HCPCS | Performed by: INTERNAL MEDICINE

## 2025-02-03 PROCEDURE — 83036 HEMOGLOBIN GLYCOSYLATED A1C: CPT | Performed by: NURSE PRACTITIONER

## 2025-02-03 PROCEDURE — 85018 HEMOGLOBIN: CPT

## 2025-02-03 PROCEDURE — 82150 ASSAY OF AMYLASE: CPT | Performed by: NURSE PRACTITIONER

## 2025-02-03 PROCEDURE — 99223 1ST HOSP IP/OBS HIGH 75: CPT | Performed by: INTERNAL MEDICINE

## 2025-02-03 PROCEDURE — 93458 L HRT ARTERY/VENTRICLE ANGIO: CPT | Performed by: INTERNAL MEDICINE

## 2025-02-03 PROCEDURE — 81001 URINALYSIS AUTO W/SCOPE: CPT | Performed by: NURSE PRACTITIONER

## 2025-02-03 PROCEDURE — C1887 CATHETER, GUIDING: HCPCS | Performed by: INTERNAL MEDICINE

## 2025-02-03 PROCEDURE — 92941 PRQ TRLML REVSC TOT OCCL AMI: CPT | Performed by: INTERNAL MEDICINE

## 2025-02-03 PROCEDURE — 25010000002 FENTANYL CITRATE (PF) 50 MCG/ML SOLUTION

## 2025-02-03 PROCEDURE — C1769 GUIDE WIRE: HCPCS | Performed by: INTERNAL MEDICINE

## 2025-02-03 PROCEDURE — 85347 COAGULATION TIME ACTIVATED: CPT

## 2025-02-03 PROCEDURE — 027034Z DILATION OF CORONARY ARTERY, ONE ARTERY WITH DRUG-ELUTING INTRALUMINAL DEVICE, PERCUTANEOUS APPROACH: ICD-10-PCS | Performed by: INTERNAL MEDICINE

## 2025-02-03 PROCEDURE — B41D1ZZ FLUOROSCOPY OF AORTA AND BILATERAL LOWER EXTREMITY ARTERIES USING LOW OSMOLAR CONTRAST: ICD-10-PCS | Performed by: INTERNAL MEDICINE

## 2025-02-03 PROCEDURE — 82803 BLOOD GASES ANY COMBINATION: CPT

## 2025-02-03 PROCEDURE — 25010000002 PROPOFOL 10 MG/ML EMULSION

## 2025-02-03 PROCEDURE — 25510000001 IOPAMIDOL PER 1 ML: Performed by: INTERNAL MEDICINE

## 2025-02-03 PROCEDURE — 82565 ASSAY OF CREATININE: CPT

## 2025-02-03 PROCEDURE — C1894 INTRO/SHEATH, NON-LASER: HCPCS | Performed by: INTERNAL MEDICINE

## 2025-02-03 PROCEDURE — 80053 COMPREHEN METABOLIC PANEL: CPT | Performed by: NURSE PRACTITIONER

## 2025-02-03 PROCEDURE — 87040 BLOOD CULTURE FOR BACTERIA: CPT | Performed by: NURSE PRACTITIONER

## 2025-02-03 PROCEDURE — 25810000003 SODIUM CHLORIDE 0.9 % SOLUTION: Performed by: INTERNAL MEDICINE

## 2025-02-03 PROCEDURE — 85730 THROMBOPLASTIN TIME PARTIAL: CPT | Performed by: NURSE PRACTITIONER

## 2025-02-03 PROCEDURE — 80061 LIPID PANEL: CPT | Performed by: NURSE PRACTITIONER

## 2025-02-03 PROCEDURE — 83880 ASSAY OF NATRIURETIC PEPTIDE: CPT | Performed by: NURSE PRACTITIONER

## 2025-02-03 PROCEDURE — B2111ZZ FLUOROSCOPY OF MULTIPLE CORONARY ARTERIES USING LOW OSMOLAR CONTRAST: ICD-10-PCS | Performed by: INTERNAL MEDICINE

## 2025-02-03 PROCEDURE — 94799 UNLISTED PULMONARY SVC/PX: CPT

## 2025-02-03 PROCEDURE — 25010000002 NICARDIPINE 2.5 MG/ML SOLUTION 10 ML VIAL: Performed by: NURSE PRACTITIONER

## 2025-02-03 PROCEDURE — 80051 ELECTROLYTE PANEL: CPT

## 2025-02-03 PROCEDURE — 94002 VENT MGMT INPAT INIT DAY: CPT

## 2025-02-03 PROCEDURE — 83605 ASSAY OF LACTIC ACID: CPT

## 2025-02-03 PROCEDURE — C1874 STENT, COATED/COV W/DEL SYS: HCPCS | Performed by: INTERNAL MEDICINE

## 2025-02-03 PROCEDURE — 84100 ASSAY OF PHOSPHORUS: CPT | Performed by: NURSE PRACTITIONER

## 2025-02-03 PROCEDURE — 84132 ASSAY OF SERUM POTASSIUM: CPT

## 2025-02-03 PROCEDURE — 84145 PROCALCITONIN (PCT): CPT | Performed by: NURSE PRACTITIONER

## 2025-02-03 PROCEDURE — 83605 ASSAY OF LACTIC ACID: CPT | Performed by: NURSE PRACTITIONER

## 2025-02-03 PROCEDURE — 82330 ASSAY OF CALCIUM: CPT | Performed by: NURSE PRACTITIONER

## 2025-02-03 PROCEDURE — 83735 ASSAY OF MAGNESIUM: CPT | Performed by: NURSE PRACTITIONER

## 2025-02-03 PROCEDURE — C1725 CATH, TRANSLUMIN NON-LASER: HCPCS | Performed by: INTERNAL MEDICINE

## 2025-02-03 PROCEDURE — 82948 REAGENT STRIP/BLOOD GLUCOSE: CPT

## 2025-02-03 PROCEDURE — 25010000002 LIDOCAINE 2% SOLUTION: Performed by: INTERNAL MEDICINE

## 2025-02-03 PROCEDURE — 63710000001 INSULIN LISPRO (HUMAN) PER 5 UNITS: Performed by: NURSE PRACTITIONER

## 2025-02-03 PROCEDURE — 4A023N7 MEASUREMENT OF CARDIAC SAMPLING AND PRESSURE, LEFT HEART, PERCUTANEOUS APPROACH: ICD-10-PCS | Performed by: INTERNAL MEDICINE

## 2025-02-03 PROCEDURE — 93306 TTE W/DOPPLER COMPLETE: CPT

## 2025-02-03 PROCEDURE — 82550 ASSAY OF CK (CPK): CPT | Performed by: NURSE PRACTITIONER

## 2025-02-03 PROCEDURE — 99152 MOD SED SAME PHYS/QHP 5/>YRS: CPT | Performed by: INTERNAL MEDICINE

## 2025-02-03 PROCEDURE — 71045 X-RAY EXAM CHEST 1 VIEW: CPT

## 2025-02-03 PROCEDURE — 25010000002 PHENYLEPHRINE 10 MG/ML SOLUTION: Performed by: INTERNAL MEDICINE

## 2025-02-03 PROCEDURE — 87641 MR-STAPH DNA AMP PROBE: CPT | Performed by: NURSE PRACTITIONER

## 2025-02-03 PROCEDURE — 87449 NOS EACH ORGANISM AG IA: CPT | Performed by: NURSE PRACTITIONER

## 2025-02-03 PROCEDURE — 93306 TTE W/DOPPLER COMPLETE: CPT | Performed by: INTERNAL MEDICINE

## 2025-02-03 PROCEDURE — 82330 ASSAY OF CALCIUM: CPT

## 2025-02-03 PROCEDURE — 93010 ELECTROCARDIOGRAM REPORT: CPT | Performed by: INTERNAL MEDICINE

## 2025-02-03 PROCEDURE — 93005 ELECTROCARDIOGRAM TRACING: CPT | Performed by: NURSE PRACTITIONER

## 2025-02-03 PROCEDURE — 25010000002 MIDAZOLAM PER 1 MG

## 2025-02-03 DEVICE — XIENCE SKYPOINT™ EVEROLIMUS ELUTING CORONARY STENT SYSTEM 3.50 MM X 23 MM / RAPID-EXCHANGE
Type: IMPLANTABLE DEVICE | Site: CORONARY | Status: FUNCTIONAL
Brand: XIENCE SKYPOINT™

## 2025-02-03 RX ORDER — BISACODYL 5 MG/1
5 TABLET, DELAYED RELEASE ORAL DAILY PRN
Status: DISCONTINUED | OUTPATIENT
Start: 2025-02-03 | End: 2025-02-03

## 2025-02-03 RX ORDER — SODIUM CHLORIDE 9 MG/ML
40 INJECTION, SOLUTION INTRAVENOUS AS NEEDED
Status: DISCONTINUED | OUTPATIENT
Start: 2025-02-03 | End: 2025-02-10 | Stop reason: HOSPADM

## 2025-02-03 RX ORDER — FENTANYL CITRATE 50 UG/ML
INJECTION, SOLUTION INTRAMUSCULAR; INTRAVENOUS
Status: COMPLETED
Start: 2025-02-03 | End: 2025-02-03

## 2025-02-03 RX ORDER — MIDAZOLAM HYDROCHLORIDE 1 MG/ML
2 INJECTION, SOLUTION INTRAMUSCULAR; INTRAVENOUS ONCE
Status: COMPLETED | OUTPATIENT
Start: 2025-02-03 | End: 2025-02-03

## 2025-02-03 RX ORDER — CHLORHEXIDINE GLUCONATE ORAL RINSE 1.2 MG/ML
15 SOLUTION DENTAL EVERY 12 HOURS SCHEDULED
Status: DISCONTINUED | OUTPATIENT
Start: 2025-02-03 | End: 2025-02-07

## 2025-02-03 RX ORDER — FENTANYL CITRATE 50 UG/ML
25 INJECTION, SOLUTION INTRAMUSCULAR; INTRAVENOUS ONCE
Status: COMPLETED | OUTPATIENT
Start: 2025-02-03 | End: 2025-02-03

## 2025-02-03 RX ORDER — SODIUM CHLORIDE 0.9 % (FLUSH) 0.9 %
10 SYRINGE (ML) INJECTION AS NEEDED
Status: DISCONTINUED | OUTPATIENT
Start: 2025-02-03 | End: 2025-02-10 | Stop reason: HOSPADM

## 2025-02-03 RX ORDER — AMOXICILLIN 250 MG
2 CAPSULE ORAL 2 TIMES DAILY PRN
Status: DISCONTINUED | OUTPATIENT
Start: 2025-02-03 | End: 2025-02-07

## 2025-02-03 RX ORDER — HEPARIN SODIUM 10000 [USP'U]/100ML
12 INJECTION, SOLUTION INTRAVENOUS
Status: DISCONTINUED | OUTPATIENT
Start: 2025-02-03 | End: 2025-02-03

## 2025-02-03 RX ORDER — ALBUTEROL SULFATE 90 UG/1
2 INHALANT RESPIRATORY (INHALATION) EVERY 4 HOURS PRN
COMMUNITY

## 2025-02-03 RX ORDER — NOREPINEPHRINE BITARTRATE 0.03 MG/ML
INJECTION, SOLUTION INTRAVENOUS
Status: COMPLETED | OUTPATIENT
Start: 2025-02-03 | End: 2025-02-03

## 2025-02-03 RX ORDER — INSULIN LISPRO 100 [IU]/ML
2-9 INJECTION, SOLUTION INTRAVENOUS; SUBCUTANEOUS EVERY 6 HOURS SCHEDULED
Status: DISCONTINUED | OUTPATIENT
Start: 2025-02-03 | End: 2025-02-03

## 2025-02-03 RX ORDER — ONDANSETRON 4 MG/1
4 TABLET, ORALLY DISINTEGRATING ORAL EVERY 6 HOURS PRN
Status: DISCONTINUED | OUTPATIENT
Start: 2025-02-03 | End: 2025-02-10 | Stop reason: HOSPADM

## 2025-02-03 RX ORDER — DEXTROSE MONOHYDRATE 25 G/50ML
25 INJECTION, SOLUTION INTRAVENOUS
Status: DISCONTINUED | OUTPATIENT
Start: 2025-02-03 | End: 2025-02-10 | Stop reason: HOSPADM

## 2025-02-03 RX ORDER — IPRATROPIUM BROMIDE AND ALBUTEROL SULFATE 2.5; .5 MG/3ML; MG/3ML
3 SOLUTION RESPIRATORY (INHALATION)
Status: DISCONTINUED | OUTPATIENT
Start: 2025-02-03 | End: 2025-02-04

## 2025-02-03 RX ORDER — FENTANYL CITRATE 50 UG/ML
50 INJECTION, SOLUTION INTRAMUSCULAR; INTRAVENOUS ONCE
Status: COMPLETED | OUTPATIENT
Start: 2025-02-03 | End: 2025-02-03

## 2025-02-03 RX ORDER — IPRATROPIUM BROMIDE AND ALBUTEROL SULFATE 2.5; .5 MG/3ML; MG/3ML
3 SOLUTION RESPIRATORY (INHALATION) EVERY 4 HOURS PRN
Status: DISCONTINUED | OUTPATIENT
Start: 2025-02-03 | End: 2025-02-10 | Stop reason: HOSPADM

## 2025-02-03 RX ORDER — NICOTINE 21 MG/24HR
1 PATCH, TRANSDERMAL 24 HOURS TRANSDERMAL DAILY PRN
Status: DISCONTINUED | OUTPATIENT
Start: 2025-02-03 | End: 2025-02-10 | Stop reason: HOSPADM

## 2025-02-03 RX ORDER — POLYETHYLENE GLYCOL 3350 17 G/17G
17 POWDER, FOR SOLUTION ORAL DAILY PRN
Status: DISCONTINUED | OUTPATIENT
Start: 2025-02-03 | End: 2025-02-03

## 2025-02-03 RX ORDER — METHYLPREDNISOLONE SODIUM SUCCINATE 125 MG/2ML
60 INJECTION, POWDER, LYOPHILIZED, FOR SOLUTION INTRAMUSCULAR; INTRAVENOUS EVERY 6 HOURS
Status: DISCONTINUED | OUTPATIENT
Start: 2025-02-03 | End: 2025-02-04

## 2025-02-03 RX ORDER — NITROGLYCERIN 0.4 MG/1
0.4 TABLET SUBLINGUAL
Status: DISCONTINUED | OUTPATIENT
Start: 2025-02-03 | End: 2025-02-10 | Stop reason: HOSPADM

## 2025-02-03 RX ORDER — METOPROLOL SUCCINATE 25 MG/1
1 TABLET, EXTENDED RELEASE ORAL DAILY
COMMUNITY

## 2025-02-03 RX ORDER — PANTOPRAZOLE SODIUM 40 MG/10ML
40 INJECTION, POWDER, LYOPHILIZED, FOR SOLUTION INTRAVENOUS EVERY MORNING
Status: DISCONTINUED | OUTPATIENT
Start: 2025-02-04 | End: 2025-02-04

## 2025-02-03 RX ORDER — POLYETHYLENE GLYCOL 3350 17 G/17G
17 POWDER, FOR SOLUTION ORAL DAILY
Status: DISCONTINUED | OUTPATIENT
Start: 2025-02-03 | End: 2025-02-07

## 2025-02-03 RX ORDER — SODIUM CHLORIDE 9 MG/ML
30 INJECTION, SOLUTION INTRAVENOUS CONTINUOUS
Status: DISPENSED | OUTPATIENT
Start: 2025-02-03 | End: 2025-02-04

## 2025-02-03 RX ORDER — ALUMINA, MAGNESIA, AND SIMETHICONE 2400; 2400; 240 MG/30ML; MG/30ML; MG/30ML
15 SUSPENSION ORAL EVERY 6 HOURS PRN
Status: DISCONTINUED | OUTPATIENT
Start: 2025-02-03 | End: 2025-02-10 | Stop reason: HOSPADM

## 2025-02-03 RX ORDER — IBUPROFEN 600 MG/1
1 TABLET ORAL
Status: DISCONTINUED | OUTPATIENT
Start: 2025-02-03 | End: 2025-02-03

## 2025-02-03 RX ORDER — INSULIN LISPRO 100 [IU]/ML
4-24 INJECTION, SOLUTION INTRAVENOUS; SUBCUTANEOUS EVERY 6 HOURS SCHEDULED
Status: DISCONTINUED | OUTPATIENT
Start: 2025-02-03 | End: 2025-02-09

## 2025-02-03 RX ORDER — HEPARIN SODIUM 1000 [USP'U]/ML
INJECTION, SOLUTION INTRAVENOUS; SUBCUTANEOUS
Status: DISCONTINUED | OUTPATIENT
Start: 2025-02-03 | End: 2025-02-03 | Stop reason: HOSPADM

## 2025-02-03 RX ORDER — SODIUM CHLORIDE 9 MG/ML
250 INJECTION, SOLUTION INTRAVENOUS ONCE AS NEEDED
Status: DISCONTINUED | OUTPATIENT
Start: 2025-02-03 | End: 2025-02-04

## 2025-02-03 RX ORDER — LISINOPRIL 10 MG/1
10 TABLET ORAL DAILY
COMMUNITY
End: 2025-02-10 | Stop reason: HOSPADM

## 2025-02-03 RX ORDER — IBUPROFEN 600 MG/1
1 TABLET ORAL
Status: DISCONTINUED | OUTPATIENT
Start: 2025-02-03 | End: 2025-02-10 | Stop reason: HOSPADM

## 2025-02-03 RX ORDER — DIPHENHYDRAMINE HCL 25 MG
25 CAPSULE ORAL EVERY 6 HOURS PRN
Status: DISCONTINUED | OUTPATIENT
Start: 2025-02-03 | End: 2025-02-10 | Stop reason: HOSPADM

## 2025-02-03 RX ORDER — BISACODYL 5 MG/1
5 TABLET, DELAYED RELEASE ORAL DAILY PRN
Status: DISCONTINUED | OUTPATIENT
Start: 2025-02-03 | End: 2025-02-07

## 2025-02-03 RX ORDER — MIDAZOLAM HYDROCHLORIDE 1 MG/ML
INJECTION, SOLUTION INTRAMUSCULAR; INTRAVENOUS
Status: COMPLETED
Start: 2025-02-03 | End: 2025-02-03

## 2025-02-03 RX ORDER — AMOXICILLIN 250 MG
2 CAPSULE ORAL 2 TIMES DAILY PRN
Status: DISCONTINUED | OUTPATIENT
Start: 2025-02-03 | End: 2025-02-03

## 2025-02-03 RX ORDER — ONDANSETRON 2 MG/ML
4 INJECTION INTRAMUSCULAR; INTRAVENOUS EVERY 6 HOURS PRN
Status: DISCONTINUED | OUTPATIENT
Start: 2025-02-03 | End: 2025-02-10 | Stop reason: HOSPADM

## 2025-02-03 RX ORDER — ACETAMINOPHEN 325 MG/1
650 TABLET ORAL EVERY 4 HOURS PRN
Status: DISCONTINUED | OUTPATIENT
Start: 2025-02-03 | End: 2025-02-10 | Stop reason: HOSPADM

## 2025-02-03 RX ORDER — DEXMEDETOMIDINE HYDROCHLORIDE 4 UG/ML
.2-1.5 INJECTION, SOLUTION INTRAVENOUS
Status: DISCONTINUED | OUTPATIENT
Start: 2025-02-03 | End: 2025-02-05

## 2025-02-03 RX ORDER — SODIUM CHLORIDE 0.9 % (FLUSH) 0.9 %
10 SYRINGE (ML) INJECTION EVERY 12 HOURS SCHEDULED
Status: DISCONTINUED | OUTPATIENT
Start: 2025-02-03 | End: 2025-02-10 | Stop reason: HOSPADM

## 2025-02-03 RX ORDER — FENTANYL CITRATE 50 UG/ML
25 INJECTION, SOLUTION INTRAMUSCULAR; INTRAVENOUS
Status: DISCONTINUED | OUTPATIENT
Start: 2025-02-03 | End: 2025-02-04

## 2025-02-03 RX ORDER — THEOPHYLLINE 400 MG/1
1 TABLET, EXTENDED RELEASE ORAL DAILY
COMMUNITY

## 2025-02-03 RX ORDER — NICOTINE POLACRILEX 4 MG
15 LOZENGE BUCCAL
Status: DISCONTINUED | OUTPATIENT
Start: 2025-02-03 | End: 2025-02-03

## 2025-02-03 RX ORDER — BISACODYL 10 MG
10 SUPPOSITORY, RECTAL RECTAL DAILY PRN
Status: DISCONTINUED | OUTPATIENT
Start: 2025-02-03 | End: 2025-02-07

## 2025-02-03 RX ORDER — PHENYLEPHRINE HYDROCHLORIDE 10 MG/ML
INJECTION INTRAVENOUS
Status: DISCONTINUED | OUTPATIENT
Start: 2025-02-03 | End: 2025-02-03 | Stop reason: HOSPADM

## 2025-02-03 RX ORDER — NITROGLYCERIN 0.4 MG/1
0.4 TABLET SUBLINGUAL
Status: DISCONTINUED | OUTPATIENT
Start: 2025-02-03 | End: 2025-02-03 | Stop reason: SDUPTHER

## 2025-02-03 RX ORDER — NOREPINEPHRINE BITARTRATE 0.03 MG/ML
.02-.5 INJECTION, SOLUTION INTRAVENOUS
Status: DISCONTINUED | OUTPATIENT
Start: 2025-02-03 | End: 2025-02-05

## 2025-02-03 RX ORDER — ASPIRIN 325 MG
TABLET ORAL
Status: DISCONTINUED | OUTPATIENT
Start: 2025-02-03 | End: 2025-02-03 | Stop reason: HOSPADM

## 2025-02-03 RX ORDER — BISACODYL 10 MG
10 SUPPOSITORY, RECTAL RECTAL DAILY PRN
Status: DISCONTINUED | OUTPATIENT
Start: 2025-02-03 | End: 2025-02-03

## 2025-02-03 RX ORDER — IOPAMIDOL 755 MG/ML
INJECTION, SOLUTION INTRAVASCULAR
Status: DISCONTINUED | OUTPATIENT
Start: 2025-02-03 | End: 2025-02-03 | Stop reason: HOSPADM

## 2025-02-03 RX ORDER — IPRATROPIUM BROMIDE AND ALBUTEROL SULFATE 2.5; .5 MG/3ML; MG/3ML
3 SOLUTION RESPIRATORY (INHALATION)
Status: DISCONTINUED | OUTPATIENT
Start: 2025-02-03 | End: 2025-02-03

## 2025-02-03 RX ORDER — LIDOCAINE HYDROCHLORIDE 20 MG/ML
INJECTION, SOLUTION INFILTRATION; PERINEURAL
Status: DISCONTINUED | OUTPATIENT
Start: 2025-02-03 | End: 2025-02-03 | Stop reason: HOSPADM

## 2025-02-03 RX ORDER — DEXTROSE MONOHYDRATE 25 G/50ML
25 INJECTION, SOLUTION INTRAVENOUS
Status: DISCONTINUED | OUTPATIENT
Start: 2025-02-03 | End: 2025-02-03

## 2025-02-03 RX ORDER — NICOTINE POLACRILEX 4 MG
15 LOZENGE BUCCAL
Status: DISCONTINUED | OUTPATIENT
Start: 2025-02-03 | End: 2025-02-10 | Stop reason: HOSPADM

## 2025-02-03 RX ORDER — ASPIRIN 81 MG/1
81 TABLET, CHEWABLE ORAL DAILY
Status: DISCONTINUED | OUTPATIENT
Start: 2025-02-04 | End: 2025-02-04

## 2025-02-03 RX ADMIN — HEPARIN SODIUM 12 UNITS/KG/HR: 10000 INJECTION, SOLUTION INTRAVENOUS at 16:55

## 2025-02-03 RX ADMIN — SODIUM CHLORIDE 5 MG/HR: 9 INJECTION, SOLUTION INTRAVENOUS at 21:04

## 2025-02-03 RX ADMIN — FENTANYL CITRATE 50 MCG: 50 INJECTION, SOLUTION INTRAMUSCULAR; INTRAVENOUS at 18:38

## 2025-02-03 RX ADMIN — FENTANYL CITRATE 50 MCG: 50 INJECTION, SOLUTION INTRAMUSCULAR; INTRAVENOUS at 16:59

## 2025-02-03 RX ADMIN — FENTANYL CITRATE 25 MCG: 50 INJECTION, SOLUTION INTRAMUSCULAR; INTRAVENOUS at 21:57

## 2025-02-03 RX ADMIN — SODIUM CHLORIDE 5 MG/HR: 9 INJECTION, SOLUTION INTRAVENOUS at 18:22

## 2025-02-03 RX ADMIN — DEXMEDETOMIDINE HYDROCHLORIDE IN SODIUM CHLORIDE 1.5 MCG/KG/HR: 4 INJECTION INTRAVENOUS at 16:53

## 2025-02-03 RX ADMIN — INSULIN LISPRO 2 UNITS: 100 INJECTION, SOLUTION INTRAVENOUS; SUBCUTANEOUS at 18:22

## 2025-02-03 RX ADMIN — DEXMEDETOMIDINE HYDROCHLORIDE IN SODIUM CHLORIDE 0.3 MCG/KG/HR: 4 INJECTION INTRAVENOUS at 21:15

## 2025-02-03 RX ADMIN — PROPOFOL 5 MCG/KG/MIN: 10 INJECTION, EMULSION INTRAVENOUS at 23:02

## 2025-02-03 RX ADMIN — DEXMEDETOMIDINE HYDROCHLORIDE IN SODIUM CHLORIDE 1 MCG/KG/HR: 4 INJECTION INTRAVENOUS at 21:51

## 2025-02-03 RX ADMIN — IPRATROPIUM BROMIDE AND ALBUTEROL SULFATE 3 ML: .5; 3 SOLUTION RESPIRATORY (INHALATION) at 23:50

## 2025-02-03 RX ADMIN — MIDAZOLAM HYDROCHLORIDE 2 MG: 1 INJECTION, SOLUTION INTRAMUSCULAR; INTRAVENOUS at 16:59

## 2025-02-03 RX ADMIN — MIDAZOLAM 2 MG: 1 INJECTION INTRAMUSCULAR; INTRAVENOUS at 16:59

## 2025-02-03 NOTE — Clinical Note
First balloon inflation max pressure = 12 esperanza. First balloon inflation duration = 15 seconds. Second inflation of balloon - Max pressure = 12 esperanza. 2nd Inflation of balloon - Duration = 15 seconds.

## 2025-02-03 NOTE — H&P
Critical Care History and Physical     Chu Peralta : 1965 MRN:[id number] LOS:0 ROOM: Early/Cath     Reason for admission: Acute on chronic respiratory failure     Assessment / Plan     Acute on chronic respiratory failure with hypoxia and hypercapnia:   Multifactorial with influenza A infection, acute exacerbation COPD, and possible superimposed bacterial pneumonia  Procalcitonin 37.4  Urine antigens for Legionella and strep pneumo negative  MRSA DNA probe negative  Sputum culture pending  He received a dose of ceftriaxone and vancomycin at [facility].  Continue ceftriaxone monotherapy  Ventilator settings noted and adjusted as needed.  Intubated 2/3  Close monitoring of ABG.   Minimize sedation/analgesia to keep RASS of 0 to -1.    STEMI  HS Troponin 161.5 with repeat 4495.7 at [facility]  HS troponin 1288, CK 1076, proBNP 771  Heparin drip infusing per ACS protocol  EKG with ST elevation  Cardiology consulted  Stat echocardiogram  Plan to proceed to cardiac catheterization lab    Essential Hypertension  The patient's wife reports that the patient is on an ACE inhibitor and metoprolol however he missed his doses this morning  Titrate medications as needed.  Home meds have not been verified    Hyperglycemia, no previous diagnosis of diabetes mellitus  Hemoglobin A1c 6.06  Sliding scale insulin for tight glycemic control  Recommend outpatient follow-up, defer to discharging provider      Ongoing tobacco abuse    Code Status (Patient has no pulse and is not breathing): CPR (Attempt to Resuscitate)  Medical Interventions (Patient has pulse or is breathing): Full Support       Nutrition:   NPO Diet NPO Type: Strict NPO     VTE Prophylaxis:  Pharmacologic VTE prophylaxis orders are present.         History of Present illness     Mr. Peralta is a 59-year-old gentleman with a past medical history of hypertension, chronic hypoxemic respiratory failure on home O2 at 2 L, ongoing tobacco abuse, COPD, restless leg  syndrome, anxiety, and chronic back pain.  He lives at home independently with his wife.  He uses home O2 and breathing treatments.  He is a current everyday smoker of ~8 cigarettes/day and has smoked for approximately 50 years.The patient presented to the ED of [facility] today for evaluation of respiratory distress. By report, the patient had developed progressive worsening of dyspnea overnight and he did a duoneb treatment without relief.  The patient initially refused intubation and was apparently wanting to be a DNR/DNI once the patient was incapacitated by respiratory failure, the DNI was rescinded by the family once they observed the degree of respiratory distress the patient was having.  After a failed CPAP trial in the ED the patient was intubated for mechanical ventilation support.     Information gleaned from review of transfer documentation includes:    CT chest per PE protocol negative for PE. It did reveal resolved AMBREEN confluent pneumonia from prior study however it did show scattered airspace opacities and inflammatory change throughout the lungs bilaterally concerning for new multifocal pneumonia, bronchial wal thickening in the bilateral lower lungs consistent with mucus impaction, ascending thoracic aortic aneurysm measuring 4.1 cm.  No previous diagnostic imaging available for comparison.    HS Troponin 161.5 with repeat 4495.7  .0  Lactate 7.2  WBC 21  ABG post intubation: 7.15/53/286/18/99%  DDimer 1.18  RVP positive for flu A  , RR 44-50  Saturation as low as 77%    The patient received empiric antibiotics with ceftriaxone and vancomycin after cultures were obtained.  The patient was transferred to [facility] for higher level of ICU management and the availability of multiple subspecialties    ACP: no ACP documentation on file.  The patient's wife is his next of kin and default alternate decision-maker    Patient was seen and examined on 02/03/25 at 19:35 EST  .      Past Medical/Surgical/Social/Family History & Allergies     Past Medical History:   Diagnosis Date    Anxiety     COPD (chronic obstructive pulmonary disease)     Depression     Emphysema lung     Hypertension     Lung nodule     Restless leg       No past surgical history on file.   Social History     Socioeconomic History    Marital status:    Tobacco Use    Smoking status: Every Day     Current packs/day: 0.66     Types: Cigarettes      No family history on file.   Allergies   Allergen Reactions    Coconut Unknown - High Severity    Keflex [Cephalexin] Unknown - Low Severity      Social Drivers of Health     Tobacco Use: High Risk (2/3/2025)    Patient History     Smoking Tobacco Use: Every Day     Smokeless Tobacco Use: Unknown     Passive Exposure: Not on file   Alcohol Use: Not on file   Financial Resource Strain: Not on file   Food Insecurity: Not on file   Transportation Needs: Not on file   Physical Activity: Not on file   Stress: Not on file   Social Connections: Not on file   Interpersonal Safety: Not on file   Depression: Not on file   Housing Stability: Not on file   Utilities: Not on file   Health Literacy: Not on file   Employment: Not on file   Disabilities: Not on file        Home Medications     Prior to Admission medications    Medication Sig Start Date End Date Taking? Authorizing Provider   escitalopram (LEXAPRO) 20 MG tablet TAKE 1 TABLET BY MOUTH ONCE DAILY 10/30/19   Radha Guevara APRN   Fluticasone Furoate-Vilanterol (Breo Ellipta) 100-25 MCG/INH inhaler Inhale 1 puff Daily. 6/22/20   Radha Guevara APRN   lisinopril-hydrochlorothiazide (PRINZIDE,ZESTORETIC) 20-12.5 MG per tablet TAKE 1 TABLET BY MOUTH ONCE DAILY 10/30/19   Radha Guevara APRN   pseudoephedrine (SUDAFED 12 HOUR) 120 MG 12 hr tablet Take 1 tablet by mouth Every 12 (Twelve) Hours. 6/27/19   Radha Guevara APRN        Objective / Physical Exam     Vital signs:  Temp: 98.8 °F (37.1 °C)  BP: (!)  87/49  Heart Rate: 110  Resp: 24  SpO2: 99 %  Weight: 75.8 kg (167 lb)    Admission Weight: Weight: 76 kg (167 lb 8.8 oz)    Physical Exam  Vitals and nursing note reviewed.   Constitutional:       General: He is not in acute distress.     Appearance: He is ill-appearing.      Comments: Intubated and sedated   HENT:      Head: Normocephalic and atraumatic.      Right Ear: External ear normal.      Left Ear: External ear normal.      Nose: Nose normal.      Mouth/Throat:      Mouth: Mucous membranes are dry.      Comments: Oral ET tube present  Eyes:      General: No scleral icterus.     Pupils: Pupils are equal, round, and reactive to light.      Comments: Conjunctival injection   Cardiovascular:      Heart sounds: Normal heart sounds, S1 normal and S2 normal. No murmur heard.     Comments: Sinus rhythm  Pulmonary:      Effort: No respiratory distress.      Breath sounds: No rhonchi.      Comments: Tight and diminished throughout  Mild wheezing throughout  Abdominal:      General: Bowel sounds are normal. There is no distension.      Palpations: Abdomen is soft.   Musculoskeletal:      Cervical back: Neck supple. No rigidity.      Right lower leg: No edema.      Left lower leg: No edema.   Skin:     Coloration: Skin is pale.      Comments: Diaphoretic   Neurological:      Comments: Intubated and sedated     Psychiatric:      Comments: Intubated and sedated          Labs     Results from last 7 days   Lab Units 02/03/25  1716 02/03/25  1710   WBC 10*3/mm3 25.64*  --    HEMATOCRIT % 40.6  --    HEMATOCRIT POC %  --  41   PLATELETS 10*3/mm3 336  --       Results from last 7 days   Lab Units 02/03/25  1716 02/03/25  1710   SODIUM mmol/L 135*  --    POTASSIUM mmol/L 5.1  --    CHLORIDE mmol/L 101  --    CO2 mmol/L 25.4  --    BUN mg/dL 11  --    CREATININE mg/dL 0.91 0.89        Imaging     Chest X ray: My independent assessment showed no infiltrates or effusions.  Chronic changes consistent with emphysema    EKG: My  independent evaluation showed sinus rhythm with ST elevation    Current Medications     Scheduled Meds:  [START ON 2/4/2025] cefTRIAXone, 2,000 mg, Intravenous, Q24H  [Transfer Hold] chlorhexidine, 15 mL, Mouth/Throat, Q12H  [Transfer Hold] insulin lispro, 4-24 Units, Subcutaneous, Q6H  [Transfer Hold] ipratropium-albuterol, 3 mL, Nebulization, Q6H - RT  [Transfer Hold] methylPREDNISolone sodium succinate, 60 mg, Intravenous, Q6H  [Transfer Hold] mupirocin, 1 Application, Each Nare, BID  [Transfer Hold] pantoprazole, 40 mg, Intravenous, QAM  [Transfer Hold] polyethylene glycol, 17 g, Oral, Daily  [Transfer Hold] sodium chloride, 10 mL, Intravenous, Q12H         Continuous Infusions:  dexmedetomidine, 0.2-1.5 mcg/kg/hr, Last Rate: 1.5 mcg/kg/hr (02/03/25 1653)  heparin, 12 Units/kg/hr, Last Rate: Stopped (02/03/25 1807)  niCARdipine, 5-15 mg/hr, Last Rate: Stopped (02/03/25 1831)  Norepinephrine-Sodium Chloride, , Last Rate: 0.3 mcg/kg/min (02/03/25 1927)  sodium chlorideKaren APRN   Critical Care  02/03/25   19:35 EST

## 2025-02-03 NOTE — Clinical Note
First balloon inflation max pressure = 10 esperanza. First balloon inflation duration = 18 seconds. Second inflation of balloon - Max pressure = 14 esperanza. 2nd Inflation of balloon - Duration = 10 seconds.

## 2025-02-04 ENCOUNTER — APPOINTMENT (OUTPATIENT)
Dept: GENERAL RADIOLOGY | Facility: HOSPITAL | Age: 60
DRG: 321 | End: 2025-02-04
Payer: MEDICARE

## 2025-02-04 DIAGNOSIS — Z95.5 S/P DRUG ELUTING CORONARY STENT PLACEMENT: Primary | ICD-10-CM

## 2025-02-04 LAB
ACT BLD: 152 SECONDS (ref 89–137)
ACT BLD: 170 SECONDS (ref 89–137)
ALBUMIN SERPL-MCNC: 3.8 G/DL (ref 3.5–5.2)
ALP SERPL-CCNC: 58 U/L (ref 39–117)
ALT SERPL W P-5'-P-CCNC: 49 U/L (ref 1–41)
ANION GAP SERPL CALCULATED.3IONS-SCNC: 12.9 MMOL/L (ref 5–15)
APTT PPP: 32.9 SECONDS (ref 22.7–35.4)
ARTERIAL PATENCY WRIST A: ABNORMAL
AST SERPL-CCNC: 183 U/L (ref 1–40)
ATMOSPHERIC PRESS: ABNORMAL MM[HG]
BASE EXCESS BLDA CALC-SCNC: -1.4 MMOL/L (ref 0–3)
BASE EXCESS BLDA CALC-SCNC: -3.1 MMOL/L (ref 0–3)
BASE EXCESS BLDA CALC-SCNC: -6.5 MMOL/L (ref 0–3)
BASOPHILS # BLD AUTO: 0.01 10*3/MM3 (ref 0–0.2)
BASOPHILS NFR BLD AUTO: 0.1 % (ref 0–1.5)
BDY SITE: ABNORMAL
BILIRUB CONJ SERPL-MCNC: 0.1 MG/DL (ref 0–0.3)
BILIRUB INDIRECT SERPL-MCNC: ABNORMAL MG/DL
BILIRUB SERPL-MCNC: <0.2 MG/DL (ref 0–1.2)
BUN SERPL-MCNC: 20 MG/DL (ref 8–23)
BUN/CREAT SERPL: 19.6 (ref 7–25)
CALCIUM SPEC-SCNC: 8.7 MG/DL (ref 8.6–10.5)
CHLORIDE SERPL-SCNC: 105 MMOL/L (ref 98–107)
CO2 BLDA-SCNC: 25.2 MMOL/L (ref 22–29)
CO2 BLDA-SCNC: 25.4 MMOL/L (ref 22–29)
CO2 BLDA-SCNC: 25.5 MMOL/L (ref 22–29)
CO2 SERPL-SCNC: 21.1 MMOL/L (ref 22–29)
CREAT SERPL-MCNC: 1.02 MG/DL (ref 0.76–1.27)
D-LACTATE SERPL-SCNC: 1.8 MMOL/L (ref 0.5–2)
D-LACTATE SERPL-SCNC: 3 MMOL/L (ref 0.5–2)
D-LACTATE SERPL-SCNC: 3.9 MMOL/L (ref 0.5–2)
D-LACTATE SERPL-SCNC: 4.2 MMOL/L (ref 0.5–2)
D-LACTATE SERPL-SCNC: 4.2 MMOL/L (ref 0.5–2)
DEPRECATED RDW RBC AUTO: 48.4 FL (ref 37–54)
EGFRCR SERPLBLD CKD-EPI 2021: 83.6 ML/MIN/1.73
EOSINOPHIL # BLD AUTO: 0 10*3/MM3 (ref 0–0.4)
EOSINOPHIL NFR BLD AUTO: 0 % (ref 0.3–6.2)
ERYTHROCYTE [DISTWIDTH] IN BLOOD BY AUTOMATED COUNT: 14.5 % (ref 12.3–15.4)
GEN 5 1HR TROPONIN T REFLEX: 2970 NG/L
GLUCOSE BLDC GLUCOMTR-MCNC: 169 MG/DL (ref 70–105)
GLUCOSE BLDC GLUCOMTR-MCNC: 169 MG/DL (ref 70–105)
GLUCOSE BLDC GLUCOMTR-MCNC: 179 MG/DL (ref 70–105)
GLUCOSE BLDC GLUCOMTR-MCNC: 186 MG/DL (ref 70–105)
GLUCOSE BLDC GLUCOMTR-MCNC: 206 MG/DL (ref 70–105)
GLUCOSE BLDC GLUCOMTR-MCNC: 209 MG/DL (ref 70–105)
GLUCOSE BLDC GLUCOMTR-MCNC: 238 MG/DL (ref 70–105)
GLUCOSE SERPL-MCNC: 209 MG/DL (ref 65–99)
HCO3 BLDA-SCNC: 23.2 MMOL/L (ref 21–28)
HCO3 BLDA-SCNC: 23.9 MMOL/L (ref 21–28)
HCO3 BLDA-SCNC: 24.1 MMOL/L (ref 21–28)
HCT VFR BLD AUTO: 37.6 % (ref 37.5–51)
HEMODILUTION: NO
HGB BLD-MCNC: 12 G/DL (ref 13–17.7)
IMM GRANULOCYTES # BLD AUTO: 0.2 10*3/MM3 (ref 0–0.05)
IMM GRANULOCYTES NFR BLD AUTO: 1.1 % (ref 0–0.5)
INHALED O2 CONCENTRATION: 50 %
INHALED O2 CONCENTRATION: 60 %
INHALED O2 CONCENTRATION: 60 %
LYMPHOCYTES # BLD AUTO: 0.53 10*3/MM3 (ref 0.7–3.1)
LYMPHOCYTES NFR BLD AUTO: 2.8 % (ref 19.6–45.3)
Lab: ABNORMAL
MAGNESIUM SERPL-MCNC: 2.5 MG/DL (ref 1.6–2.4)
MCH RBC QN AUTO: 29.1 PG (ref 26.6–33)
MCHC RBC AUTO-ENTMCNC: 31.9 G/DL (ref 31.5–35.7)
MCV RBC AUTO: 91.3 FL (ref 79–97)
MODALITY: ABNORMAL
MONOCYTES # BLD AUTO: 1.06 10*3/MM3 (ref 0.1–0.9)
MONOCYTES NFR BLD AUTO: 5.7 % (ref 5–12)
NEUTROPHILS NFR BLD AUTO: 16.9 10*3/MM3 (ref 1.7–7)
NEUTROPHILS NFR BLD AUTO: 90.3 % (ref 42.7–76)
NOTIFIED WHO: ABNORMAL
NRBC BLD AUTO-RTO: 0 /100 WBC (ref 0–0.2)
PCO2 BLDA: 42.9 MM HG (ref 35–48)
PCO2 BLDA: 49.1 MM HG (ref 35–48)
PCO2 BLDA: 65 MM HG (ref 35–48)
PEEP RESPIRATORY: 5 CM[H2O]
PH BLDA: 7.16 PH UNITS (ref 7.35–7.45)
PH BLDA: 7.29 PH UNITS (ref 7.35–7.45)
PH BLDA: 7.36 PH UNITS (ref 7.35–7.45)
PHOSPHATE SERPL-MCNC: 3.7 MG/DL (ref 2.5–4.5)
PLATELET # BLD AUTO: 330 10*3/MM3 (ref 140–450)
PMV BLD AUTO: 9 FL (ref 6–12)
PO2 BLD: 162 MM[HG] (ref 0–500)
PO2 BLD: 239 MM[HG] (ref 0–500)
PO2 BLD: 360 MM[HG] (ref 0–500)
PO2 BLDA: 143.1 MM HG (ref 83–108)
PO2 BLDA: 180 MM HG (ref 83–108)
PO2 BLDA: 97 MM HG (ref 83–108)
POTASSIUM SERPL-SCNC: 4.7 MMOL/L (ref 3.5–5.2)
PROT SERPL-MCNC: 6.9 G/DL (ref 6–8.5)
RBC # BLD AUTO: 4.12 10*6/MM3 (ref 4.14–5.8)
READ BACK: ABNORMAL
RESPIRATORY RATE: 28
SAO2 % BLDCOA: 96.6 % (ref 94–98)
SAO2 % BLDCOA: 98.3 % (ref 94–98)
SAO2 % BLDCOA: 99.5 % (ref 94–98)
SODIUM SERPL-SCNC: 139 MMOL/L (ref 136–145)
TROPONIN T % DELTA: 131
TROPONIN T NUMERIC DELTA: 1682 NG/L
VENTILATOR MODE: AC
VT ON VENT VENT: 500 ML
WBC NRBC COR # BLD AUTO: 18.7 10*3/MM3 (ref 3.4–10.8)

## 2025-02-04 PROCEDURE — 84484 ASSAY OF TROPONIN QUANT: CPT | Performed by: INTERNAL MEDICINE

## 2025-02-04 PROCEDURE — 4A133B1 MONITORING OF ARTERIAL PRESSURE, PERIPHERAL, PERCUTANEOUS APPROACH: ICD-10-PCS

## 2025-02-04 PROCEDURE — 83605 ASSAY OF LACTIC ACID: CPT

## 2025-02-04 PROCEDURE — 93005 ELECTROCARDIOGRAM TRACING: CPT | Performed by: INTERNAL MEDICINE

## 2025-02-04 PROCEDURE — 03HY32Z INSERTION OF MONITORING DEVICE INTO UPPER ARTERY, PERCUTANEOUS APPROACH: ICD-10-PCS

## 2025-02-04 PROCEDURE — 85347 COAGULATION TIME ACTIVATED: CPT

## 2025-02-04 PROCEDURE — 25810000003 LACTATED RINGERS SOLUTION: Performed by: EMERGENCY MEDICINE

## 2025-02-04 PROCEDURE — 71045 X-RAY EXAM CHEST 1 VIEW: CPT

## 2025-02-04 PROCEDURE — 94664 DEMO&/EVAL PT USE INHALER: CPT

## 2025-02-04 PROCEDURE — 80076 HEPATIC FUNCTION PANEL: CPT | Performed by: EMERGENCY MEDICINE

## 2025-02-04 PROCEDURE — 93010 ELECTROCARDIOGRAM REPORT: CPT | Performed by: INTERNAL MEDICINE

## 2025-02-04 PROCEDURE — 25010000002 METHYLPREDNISOLONE PER 125 MG: Performed by: INTERNAL MEDICINE

## 2025-02-04 PROCEDURE — 02HV33Z INSERTION OF INFUSION DEVICE INTO SUPERIOR VENA CAVA, PERCUTANEOUS APPROACH: ICD-10-PCS

## 2025-02-04 PROCEDURE — 25010000002 PROPOFOL 10 MG/ML EMULSION

## 2025-02-04 PROCEDURE — 25010000002 THIAMINE PER 100 MG: Performed by: EMERGENCY MEDICINE

## 2025-02-04 PROCEDURE — 94003 VENT MGMT INPAT SUBQ DAY: CPT

## 2025-02-04 PROCEDURE — C1751 CATH, INF, PER/CENT/MIDLINE: HCPCS

## 2025-02-04 PROCEDURE — 82803 BLOOD GASES ANY COMBINATION: CPT

## 2025-02-04 PROCEDURE — 82948 REAGENT STRIP/BLOOD GLUCOSE: CPT

## 2025-02-04 PROCEDURE — 74018 RADEX ABDOMEN 1 VIEW: CPT

## 2025-02-04 PROCEDURE — 94799 UNLISTED PULMONARY SVC/PX: CPT

## 2025-02-04 PROCEDURE — 25010000002 CEFTRIAXONE PER 250 MG: Performed by: INTERNAL MEDICINE

## 2025-02-04 PROCEDURE — 25010000002 FENTANYL CITRATE (PF) 50 MCG/ML SOLUTION

## 2025-02-04 PROCEDURE — 83605 ASSAY OF LACTIC ACID: CPT | Performed by: NURSE PRACTITIONER

## 2025-02-04 PROCEDURE — 25010000002 VASOPRESSIN 20-5 UT/100ML-% SOLUTION: Performed by: EMERGENCY MEDICINE

## 2025-02-04 PROCEDURE — 63710000001 INSULIN GLARGINE PER 5 UNITS: Performed by: NURSE PRACTITIONER

## 2025-02-04 PROCEDURE — 85730 THROMBOPLASTIN TIME PARTIAL: CPT | Performed by: INTERNAL MEDICINE

## 2025-02-04 PROCEDURE — 25010000002 FENTANYL CITRATE (PF) 50 MCG/ML SOLUTION: Performed by: EMERGENCY MEDICINE

## 2025-02-04 PROCEDURE — 63710000001 INSULIN LISPRO (HUMAN) PER 5 UNITS: Performed by: INTERNAL MEDICINE

## 2025-02-04 PROCEDURE — 94761 N-INVAS EAR/PLS OXIMETRY MLT: CPT

## 2025-02-04 PROCEDURE — 84100 ASSAY OF PHOSPHORUS: CPT | Performed by: NURSE PRACTITIONER

## 2025-02-04 PROCEDURE — 4A133J1 MONITORING OF ARTERIAL PULSE, PERIPHERAL, PERCUTANEOUS APPROACH: ICD-10-PCS

## 2025-02-04 PROCEDURE — 83735 ASSAY OF MAGNESIUM: CPT | Performed by: INTERNAL MEDICINE

## 2025-02-04 PROCEDURE — 25010000002 DOBUTAMINE PER 250 MG: Performed by: NURSE PRACTITIONER

## 2025-02-04 PROCEDURE — 25010000002 ENOXAPARIN PER 10 MG: Performed by: EMERGENCY MEDICINE

## 2025-02-04 PROCEDURE — 25810000003 LACTATED RINGERS SOLUTION

## 2025-02-04 PROCEDURE — B548ZZA ULTRASONOGRAPHY OF SUPERIOR VENA CAVA, GUIDANCE: ICD-10-PCS

## 2025-02-04 PROCEDURE — 82948 REAGENT STRIP/BLOOD GLUCOSE: CPT | Performed by: INTERNAL MEDICINE

## 2025-02-04 PROCEDURE — 85025 COMPLETE CBC W/AUTO DIFF WBC: CPT | Performed by: NURSE PRACTITIONER

## 2025-02-04 PROCEDURE — 80048 BASIC METABOLIC PNL TOTAL CA: CPT | Performed by: INTERNAL MEDICINE

## 2025-02-04 PROCEDURE — 99233 SBSQ HOSP IP/OBS HIGH 50: CPT | Performed by: INTERNAL MEDICINE

## 2025-02-04 RX ORDER — FUROSEMIDE 10 MG/ML
40 INJECTION INTRAMUSCULAR; INTRAVENOUS ONCE
Status: DISCONTINUED | OUTPATIENT
Start: 2025-02-04 | End: 2025-02-04

## 2025-02-04 RX ORDER — LANSOPRAZOLE 30 MG/1
30 TABLET, ORALLY DISINTEGRATING, DELAYED RELEASE ORAL
Status: DISCONTINUED | OUTPATIENT
Start: 2025-02-05 | End: 2025-02-07

## 2025-02-04 RX ORDER — ESCITALOPRAM OXALATE 10 MG/1
20 TABLET ORAL DAILY
Status: DISCONTINUED | OUTPATIENT
Start: 2025-02-04 | End: 2025-02-07

## 2025-02-04 RX ORDER — FENTANYL CITRATE-0.9 % NACL/PF 10 MCG/ML
50-300 PLASTIC BAG, INJECTION (ML) INTRAVENOUS
Status: DISCONTINUED | OUTPATIENT
Start: 2025-02-04 | End: 2025-02-05

## 2025-02-04 RX ORDER — VASOPRESSIN IN DEXTROSE 5 % 20/100 ML
0.03 PLASTIC BAG, INJECTION (ML) INTRAVENOUS CONTINUOUS
Status: DISCONTINUED | OUTPATIENT
Start: 2025-02-04 | End: 2025-02-04

## 2025-02-04 RX ORDER — THIAMINE HYDROCHLORIDE 100 MG/ML
200 INJECTION, SOLUTION INTRAMUSCULAR; INTRAVENOUS EVERY 8 HOURS SCHEDULED
Status: DISCONTINUED | OUTPATIENT
Start: 2025-02-04 | End: 2025-02-05

## 2025-02-04 RX ORDER — DOBUTAMINE HYDROCHLORIDE 100 MG/100ML
2 INJECTION INTRAVENOUS CONTINUOUS
Status: DISCONTINUED | OUTPATIENT
Start: 2025-02-04 | End: 2025-02-05

## 2025-02-04 RX ORDER — IPRATROPIUM BROMIDE AND ALBUTEROL SULFATE 2.5; .5 MG/3ML; MG/3ML
3 SOLUTION RESPIRATORY (INHALATION) EVERY 4 HOURS PRN
Status: DISCONTINUED | OUTPATIENT
Start: 2025-02-04 | End: 2025-02-04 | Stop reason: SDUPTHER

## 2025-02-04 RX ORDER — BUDESONIDE 0.5 MG/2ML
0.5 INHALANT ORAL
Status: DISCONTINUED | OUTPATIENT
Start: 2025-02-04 | End: 2025-02-10 | Stop reason: HOSPADM

## 2025-02-04 RX ORDER — ASPIRIN 81 MG/1
81 TABLET, CHEWABLE ORAL DAILY
Status: DISCONTINUED | OUTPATIENT
Start: 2025-02-04 | End: 2025-02-07

## 2025-02-04 RX ORDER — LISINOPRIL 5 MG/1
10 TABLET ORAL DAILY
Status: DISCONTINUED | OUTPATIENT
Start: 2025-02-04 | End: 2025-02-07

## 2025-02-04 RX ORDER — ENOXAPARIN SODIUM 100 MG/ML
40 INJECTION SUBCUTANEOUS EVERY 24 HOURS
Status: DISCONTINUED | OUTPATIENT
Start: 2025-02-04 | End: 2025-02-06

## 2025-02-04 RX ORDER — METHYLPREDNISOLONE SODIUM SUCCINATE 40 MG/ML
40 INJECTION, POWDER, LYOPHILIZED, FOR SOLUTION INTRAMUSCULAR; INTRAVENOUS DAILY
Status: DISCONTINUED | OUTPATIENT
Start: 2025-02-04 | End: 2025-02-06

## 2025-02-04 RX ORDER — ASPIRIN 81 MG/1
81 TABLET, CHEWABLE ORAL DAILY
Status: DISCONTINUED | OUTPATIENT
Start: 2025-02-05 | End: 2025-02-04

## 2025-02-04 RX ORDER — ATORVASTATIN CALCIUM 40 MG/1
40 TABLET, FILM COATED ORAL NIGHTLY
Status: DISCONTINUED | OUTPATIENT
Start: 2025-02-04 | End: 2025-02-07

## 2025-02-04 RX ORDER — IPRATROPIUM BROMIDE AND ALBUTEROL SULFATE 2.5; .5 MG/3ML; MG/3ML
3 SOLUTION RESPIRATORY (INHALATION)
Status: DISCONTINUED | OUTPATIENT
Start: 2025-02-04 | End: 2025-02-04

## 2025-02-04 RX ORDER — VASOPRESSIN IN DEXTROSE 5 % 20/100 ML
0.03 PLASTIC BAG, INJECTION (ML) INTRAVENOUS CONTINUOUS
Status: DISCONTINUED | OUTPATIENT
Start: 2025-02-04 | End: 2025-02-05

## 2025-02-04 RX ORDER — FENTANYL CITRATE 50 UG/ML
100 INJECTION, SOLUTION INTRAMUSCULAR; INTRAVENOUS
Status: DISCONTINUED | OUTPATIENT
Start: 2025-02-04 | End: 2025-02-05

## 2025-02-04 RX ORDER — METOPROLOL SUCCINATE 25 MG/1
25 TABLET, EXTENDED RELEASE ORAL DAILY
Status: DISCONTINUED | OUTPATIENT
Start: 2025-02-04 | End: 2025-02-10 | Stop reason: HOSPADM

## 2025-02-04 RX ADMIN — INSULIN LISPRO 8 UNITS: 100 INJECTION, SOLUTION INTRAVENOUS; SUBCUTANEOUS at 05:35

## 2025-02-04 RX ADMIN — PROPOFOL 50 MCG/KG/MIN: 10 INJECTION, EMULSION INTRAVENOUS at 10:05

## 2025-02-04 RX ADMIN — TICAGRELOR 90 MG: 90 TABLET ORAL at 20:26

## 2025-02-04 RX ADMIN — IPRATROPIUM BROMIDE AND ALBUTEROL SULFATE 3 ML: .5; 3 SOLUTION RESPIRATORY (INHALATION) at 19:17

## 2025-02-04 RX ADMIN — IPRATROPIUM BROMIDE AND ALBUTEROL SULFATE 3 ML: .5; 3 SOLUTION RESPIRATORY (INHALATION) at 14:30

## 2025-02-04 RX ADMIN — MUPIROCIN 1 APPLICATION: 20 OINTMENT TOPICAL at 10:30

## 2025-02-04 RX ADMIN — SODIUM CHLORIDE, POTASSIUM CHLORIDE, SODIUM LACTATE AND CALCIUM CHLORIDE 1000 ML: 600; 310; 30; 20 INJECTION, SOLUTION INTRAVENOUS at 06:19

## 2025-02-04 RX ADMIN — FENTANYL CITRATE 25 MCG: 50 INJECTION, SOLUTION INTRAMUSCULAR; INTRAVENOUS at 02:22

## 2025-02-04 RX ADMIN — INSULIN LISPRO 8 UNITS: 100 INJECTION, SOLUTION INTRAVENOUS; SUBCUTANEOUS at 12:37

## 2025-02-04 RX ADMIN — METHYLPREDNISOLONE SODIUM SUCCINATE 60 MG: 125 INJECTION, POWDER, FOR SOLUTION INTRAMUSCULAR; INTRAVENOUS at 02:05

## 2025-02-04 RX ADMIN — NOREPINEPHRINE BITARTRATE 0.14 MCG/KG/MIN: 0.03 INJECTION, SOLUTION INTRAVENOUS at 21:41

## 2025-02-04 RX ADMIN — Medication 10 ML: at 12:25

## 2025-02-04 RX ADMIN — PROPOFOL 50 MCG/KG/MIN: 10 INJECTION, EMULSION INTRAVENOUS at 15:24

## 2025-02-04 RX ADMIN — PROPOFOL 50 MCG/KG/MIN: 10 INJECTION, EMULSION INTRAVENOUS at 05:05

## 2025-02-04 RX ADMIN — BUDESONIDE 0.5 MG: 0.5 INHALANT RESPIRATORY (INHALATION) at 09:05

## 2025-02-04 RX ADMIN — MUPIROCIN 1 APPLICATION: 20 OINTMENT TOPICAL at 20:26

## 2025-02-04 RX ADMIN — Medication 10 ML: at 01:47

## 2025-02-04 RX ADMIN — DEXMEDETOMIDINE HYDROCHLORIDE IN SODIUM CHLORIDE 1.5 MCG/KG/HR: 4 INJECTION INTRAVENOUS at 06:33

## 2025-02-04 RX ADMIN — INSULIN LISPRO 4 UNITS: 100 INJECTION, SOLUTION INTRAVENOUS; SUBCUTANEOUS at 18:27

## 2025-02-04 RX ADMIN — CEFTRIAXONE 2000 MG: 2 INJECTION, POWDER, FOR SOLUTION INTRAMUSCULAR; INTRAVENOUS at 23:39

## 2025-02-04 RX ADMIN — INSULIN GLARGINE 10 UNITS: 100 INJECTION, SOLUTION SUBCUTANEOUS at 20:34

## 2025-02-04 RX ADMIN — PANTOPRAZOLE SODIUM 40 MG: 40 INJECTION, POWDER, FOR SOLUTION INTRAVENOUS at 06:19

## 2025-02-04 RX ADMIN — NOREPINEPHRINE BITARTRATE 0.3 MCG/KG/MIN: 0.03 INJECTION, SOLUTION INTRAVENOUS at 03:21

## 2025-02-04 RX ADMIN — IPRATROPIUM BROMIDE AND ALBUTEROL SULFATE 3 ML: .5; 3 SOLUTION RESPIRATORY (INHALATION) at 07:25

## 2025-02-04 RX ADMIN — PROPOFOL 50 MCG/KG/MIN: 10 INJECTION, EMULSION INTRAVENOUS at 19:02

## 2025-02-04 RX ADMIN — DOBUTAMINE HYDROCHLORIDE 2 MCG/KG/MIN: 100 INJECTION INTRAVENOUS at 17:06

## 2025-02-04 RX ADMIN — Medication 50 MCG/HR: at 09:46

## 2025-02-04 RX ADMIN — THIAMINE HYDROCHLORIDE 200 MG: 100 INJECTION, SOLUTION INTRAMUSCULAR; INTRAVENOUS at 14:46

## 2025-02-04 RX ADMIN — ATORVASTATIN CALCIUM 40 MG: 40 TABLET, FILM COATED ORAL at 20:27

## 2025-02-04 RX ADMIN — THIAMINE HYDROCHLORIDE 200 MG: 100 INJECTION, SOLUTION INTRAMUSCULAR; INTRAVENOUS at 22:24

## 2025-02-04 RX ADMIN — ENOXAPARIN SODIUM 40 MG: 100 INJECTION SUBCUTANEOUS at 17:25

## 2025-02-04 RX ADMIN — ASPIRIN 81 MG CHEWABLE TABLET 81 MG: 81 TABLET CHEWABLE at 10:06

## 2025-02-04 RX ADMIN — NOREPINEPHRINE BITARTRATE 0.24 MCG/KG/MIN: 0.03 INJECTION, SOLUTION INTRAVENOUS at 12:51

## 2025-02-04 RX ADMIN — IPRATROPIUM BROMIDE AND ALBUTEROL SULFATE 3 ML: .5; 3 SOLUTION RESPIRATORY (INHALATION) at 22:36

## 2025-02-04 RX ADMIN — Medication 10 ML: at 20:40

## 2025-02-04 RX ADMIN — CEFTRIAXONE 2000 MG: 2 INJECTION, POWDER, FOR SOLUTION INTRAMUSCULAR; INTRAVENOUS at 02:06

## 2025-02-04 RX ADMIN — CHLORHEXIDINE GLUCONATE 15 ML: 1.2 RINSE ORAL at 20:27

## 2025-02-04 RX ADMIN — METHYLPREDNISOLONE SODIUM SUCCINATE 60 MG: 125 INJECTION, POWDER, FOR SOLUTION INTRAMUSCULAR; INTRAVENOUS at 08:18

## 2025-02-04 RX ADMIN — SODIUM CHLORIDE, POTASSIUM CHLORIDE, SODIUM LACTATE AND CALCIUM CHLORIDE 1000 ML: 600; 310; 30; 20 INJECTION, SOLUTION INTRAVENOUS at 20:34

## 2025-02-04 RX ADMIN — POLYETHYLENE GLYCOL 3350 17 G: 17 POWDER, FOR SOLUTION ORAL at 10:06

## 2025-02-04 RX ADMIN — ESCITALOPRAM 20 MG: 10 TABLET, FILM COATED ORAL at 12:24

## 2025-02-04 RX ADMIN — PROPOFOL 50 MCG/KG/MIN: 10 INJECTION, EMULSION INTRAVENOUS at 23:42

## 2025-02-04 RX ADMIN — CHLORHEXIDINE GLUCONATE 15 ML: 1.2 RINSE ORAL at 01:47

## 2025-02-04 RX ADMIN — DEXMEDETOMIDINE HYDROCHLORIDE IN SODIUM CHLORIDE 1.5 MCG/KG/HR: 4 INJECTION INTRAVENOUS at 02:36

## 2025-02-04 RX ADMIN — FENTANYL CITRATE 100 MCG: 50 INJECTION, SOLUTION INTRAMUSCULAR; INTRAVENOUS at 09:20

## 2025-02-04 RX ADMIN — IPRATROPIUM BROMIDE AND ALBUTEROL SULFATE 3 ML: .5; 3 SOLUTION RESPIRATORY (INHALATION) at 11:12

## 2025-02-04 RX ADMIN — CHLORHEXIDINE GLUCONATE 15 ML: 1.2 RINSE ORAL at 10:06

## 2025-02-04 RX ADMIN — SODIUM CHLORIDE, SODIUM LACTATE, POTASSIUM CHLORIDE, AND CALCIUM CHLORIDE 500 ML: .6; .31; .03; .02 INJECTION, SOLUTION INTRAVENOUS at 14:45

## 2025-02-04 RX ADMIN — BUDESONIDE 0.5 MG: 0.5 INHALANT RESPIRATORY (INHALATION) at 19:22

## 2025-02-04 RX ADMIN — TICAGRELOR 90 MG: 90 TABLET ORAL at 10:06

## 2025-02-04 RX ADMIN — INSULIN LISPRO 4 UNITS: 100 INJECTION, SOLUTION INTRAVENOUS; SUBCUTANEOUS at 02:06

## 2025-02-04 NOTE — CASE MANAGEMENT/SOCIAL WORK
Discharge Planning Assessment   Paresh     Patient Name: Chu Peralta  MRN: [id number]  Today's Date: 2/4/2025    Admit Date: 2/3/2025    Plan: Plan to return home with spouse, pending clinical course.   Discharge Needs Assessment       Row Name 02/04/25 0930       Living Environment    People in Home spouse    Name(s) of People in Home Cassi - spouse    Current Living Arrangements home    Potentially Unsafe Housing Conditions none    In the past 12 months has the electric, gas, oil, or water company threatened to shut off services in your home? No    Primary Care Provided by self    Provides Primary Care For no one    Family Caregiver if Needed spouse    Family Caregiver Names Cassi - spouse    Quality of Family Relationships helpful;involved;supportive    Able to Return to Prior Arrangements yes       Resource/Environmental Concerns    Resource/Environmental Concerns none    Transportation Concerns none       Transportation Needs    In the past 12 months, has lack of transportation kept you from medical appointments or from getting medications? no    In the past 12 months, has lack of transportation kept you from meetings, work, or from getting things needed for daily living? No       Food Insecurity    Within the past 12 months, you worried that your food would run out before you got the money to buy more. Never true    Within the past 12 months, the food you bought just didn't last and you didn't have money to get more. Never true       Transition Planning    Patient/Family Anticipates Transition to home with family    Patient/Family Anticipated Services at Transition none    Transportation Anticipated car, drives self;family or friend will provide       Discharge Needs Assessment    Readmission Within the Last 30 Days no previous admission in last 30 days    Equipment Currently Used at Home oxygen    Concerns to be Addressed discharge planning    Anticipated Changes Related to Illness none    Equipment Needed  After Discharge none                   Discharge Plan       Row Name 02/04/25 0931       Plan    Plan Plan to return home with spouse, pending clinical course.    Patient/Family in Agreement with Plan yes    Plan Comments CM met with patient/spouse at bedside. Patient intubated/sedated. Patient lives at home with spouse who will transport at discharge. Patient performs ADLs. Current home O2 2L. PCP and pharmacy confirmed. Agreeable to M2B.  Denies financial assistance needs for medication and/or food. Denies any current DME, HH, Caregiver, or rehab services. DC Barriers: vent 50/5, NG TF, A-line x2, V-line, FC, IV Abxs/steroids, Dex/Prop/Fent/Levo gtts, Cardio/WC following.                 Expected Discharge Date and Time       Expected Discharge Date Expected Discharge Time    Feb 7, 2025            Demographic Summary       Row Name 02/04/25 0929       General Information    Admission Type inpatient    Arrived From emergency department    Required Notices Provided Important Message from Medicare    Referral Source admission list    Reason for Consult discharge planning    Preferred Language English                   Functional Status       Row Name 02/04/25 0929       Functional Status    Usual Activity Tolerance good    Current Activity Tolerance fair       Functional Status, IADL    Medications independent    Meal Preparation independent    Housekeeping independent    Laundry independent    Shopping independent       Mental Status    General Appearance WDL WDL       Mental Status Summary    Recent Changes in Mental Status/Cognitive Functioning no changes             MARIJA Alexis RN  ICU/CVU   O: [phone]  C: [phone]  [email or web address]

## 2025-02-04 NOTE — PROGRESS NOTES
Critical Care Progress Note     Chu Peralta : 1965 MRN:[id number] LOS:1  Rm: 3124/1     Principal Problem: Acute on chronic respiratory failure     Reason for follow up: All the medical problems listed below    Summary     Chu Peralta is a 59 y.o. male with PMH of COPD, Chronic hypoxic respiratory failure on home 2 L, tobacco use disorder, RLS who presented to the hospital for respiratory distress and was admitted on 2/3/2025  4:39 PM with a principal diagnosis of Acute on chronic respiratory failure.      Patient was initially brought into an outside hospital and was in respiratory distress.  Placed on BiPAP.  Initially was DNI however rescinded the DNR and was subsequently intubated.  Patient was being treated with antibiotics for multifocal pneumonia as well as influenza A.  Patient was given broad-spectrum antibiotics and transferred to [facility] for further care.    Upon arrival to [facility] the patient was noted to have concerning EKG for acute coronary syndrome and went emergently to the Cath Lab with cardiology.  He had an RCA occlusion with 1 stent placed.  Postoperative EF was around 15%.  Patient was admitted to the ICU for further care.      Chu Pearlta is now Hospital Day: 2    Significant Events / Subjective     24 hour events 25 : Patient's vasopressors have been weaned and he is currently only on levo at 0.15.  He is off of vaso.  Patient's respiratory acidosis has improved however his lactate is uptrending.  Bedside echo reveals a collapsible IVC without any B-lines seen in his lungs nor significant peripheral edema.  Patient was tachypneic and was subsequently placed on fentanyl drip.  Will attempt to wean sedation tomorrow and extubate if able however will need to closely monitor the patient's urine output, cardiac function, and continue to treat for possible infection.    Assessment / Plan   PLAN by  systems:    Neuro  #Analgesia/Sedation  #encephalopathy  -RAAS Goal 0-1  -Fent/prop  -Has history of anxiety, will wean to precedex when ready to extubate  -No focal deficits noticed off sedation so no concern for CVA at this time      Cardiovascular  Pulse  Av.2  Min: 88  Max: 128  Resp  Av  Min: 24  Max: 42  SpO2  Av.2 %  Min: 93 %  Max: 100 %  Systolic (24hrs), Av , Min:81 , Max:193     Diastolic (24hrs), Av, Min:42, Max:168    #STEMI (RCA)  #HFrEF (15-20%)  #Shock  -s/p cath with RCA stent placement on 2/3   -ASA/Brillinta  -Statin  -BB contraindicated in s/o shock  -Shock likely mixed distributive and cardiac  -On levo, will wean  -CVP of 9  -Not fluid overloaded at this time  -Continue to monitor      Pulmonary  ABG:  pH, Arterial   Date Value Ref Range Status   2025 7.358 7.350 - 7.450 pH units Final   2025 7.295 (L) 7.350 - 7.450 pH units Final   2025 7.160 (C) 7.350 - 7.450 pH units Final     pO2, Arterial   Date Value Ref Range Status   2025 180.0 (H) 83.0 - 108.0 mm Hg Final   2025 97.0 83.0 - 108.0 mm Hg Final   2025 143.1 (H) 83.0 - 108.0 mm Hg Final     pCO2, Arterial   Date Value Ref Range Status   2025 42.9 35.0 - 48.0 mm Hg Final   2025 49.1 (H) 35.0 - 48.0 mm Hg Final   2025 65.0 (H) 35.0 - 48.0 mm Hg Final     HCO3, Arterial   Date Value Ref Range Status   2025 24.1 21.0 - 28.0 mmol/L Final   2025 23.9 21.0 - 28.0 mmol/L Final   2025 23.2 21.0 - 28.0 mmol/L Final     Mode: VC/AC  FiO2 (%):  [50 %-60 %] 50 %  S RR:  [20-28] 28  S VT:  [500 mL] 500 mL  PEEP/CPAP (cm H2O):  [5 cm H20] 5 cm H20  MAP (cm H2O):  [15-21] 17  #COPD  #Hypoxic, hypercapenic respiratory failure  -On methylpred  -Respiratory acidosis improved on current vent settings  -Peak pressures in the high 20s  -Nebs  -Maintain O2 sats >92%  -CXR showing: No significant consolidations, PTX, or effusions      GI/Nutrition  #No acute  issues  NPO Diet NPO Type: Strict NPO  Patient isn't on Tube Feeding   Bowel regimen: docusate/miralax prn  Stress ulcer ppx:lansoprazole    Renal/      Lab 25  0518 25  2320 25  1716 25  1710   SODIUM 139  --   --  135*  --    POTASSIUM 4.7 5.7*  --  5.1  --    CHLORIDE 105  --   --  101  --    CO2 21.1*  --   --  25.4  --    BUN 20  --   --  11  --    CREATININE 1.02  --  1.19 0.91 0.89   GLUCOSE 209*  --   --  186*  --    CALCIUM 8.7  --   --  8.7  --    PHOSPHORUS 3.7  --   --  5.1*  --      #No acute issues  -Monitor electrolytes and UOP  -Monitor nephrotoxic agents administered  -Mims- yes for strict I/o  -making decent urine for now. No diuretic at this time      Intake/Output Summary (Last 24 hours) at 2025 0948  Last data filed at 2025 0600  Gross per 24 hour   Intake 1295 ml   Output 900 ml   Net 395 ml       Heme/Onc  Hemoglobin   Date Value Ref Range Status   2025 12.0 (L) 13.0 - 17.7 g/dL Final   2025 13.2 12.0 - 17.0 g/dL Final   2025 12.6 (L) 13.0 - 17.7 g/dL Final   2025 13.9 12.0 - 17.0 g/dL Final     Platelets   Date Value Ref Range Status   2025 330 140 - 450 10*3/mm3 Final   2025 336 140 - 450 10*3/mm3 Final     #No acute issues  -Usual transfusion parameters (HgB <7, Plt <10 unless procedures or bleeding)  -DVT PPX: VTE Prophylaxis:lovenox  Pharmacologic VTE prophylaxis orders are present.         Endocrine  Glucose   Date Value Ref Range Status   2025 209 (H) 65 - 99 mg/dL Final   2025 186 (H) 65 - 99 mg/dL Final     #hyperglycemia  -Elevated A1c, no history of DM  -Steroids exacerbating symptoms  -Goal  140-180  -Accuchecks and SSI    ID  WBC   Date Value Ref Range Status   2025 18.70 (H) 3.40 - 10.80 10*3/mm3 Final   2025 25.64 (H) 3.40 - 10.80 10*3/mm3 Final     Temp (24hrs), Av °F (36.7 °C), Min:97.4 °F (36.3 °C), Max:98.8 °F (37.1 °C)    #Shock  #Possible pneumonia  #influenza  A  -Leukocytosis to 25 on presentation  -CT chest c/f multifocal pneumonia at OSH  -negative strep/legionella antigen   -No evidence of infection in urine  -RVP positive for influenza A.  Per wife symptoms has been going on >48 hours. No indications for tamiflu at this time  -Monitor for signs of infection  -cefTRIAXone (ROCEPHIN) 2000 mg IVPB in 100 mL NS (MBP)    MSK/Skin  #No acute issues  -PT/OT  -Media tab for wound pictures      Disposition:  remain in ICU    Code status:   Level Of Support Discussed With: Patient  Code Status (Patient has no pulse and is not breathing): CPR (Attempt to Resuscitate)  Medical Interventions (Patient has pulse or is breathing): Full Support           Objective / Physical Exam     Vital signs:  Temp: 98.1 °F (36.7 °C)  BP: 120/79  Heart Rate: 111  Resp: (!) 30  SpO2: 98 %  Weight: 75.5 kg (166 lb 7.2 oz)    Admission Weight: Weight: 76 kg (167 lb 8.8 oz)  Current Weight: Weight: 75.5 kg (166 lb 7.2 oz)    Input/Output in last 24 hours:    Intake/Output Summary (Last 24 hours) at 2/4/2025 0948  Last data filed at 2/4/2025 0600  Gross per 24 hour   Intake 1295 ml   Output 900 ml   Net 395 ml      Net IO Since Admission: 395 mL [02/04/25 0948]     GENERAL APPEARANCE:  Laying in bed intubated with tachypnea  HEENT:  Normal conjunctivae, normal eyelids  NECK:  trachea midline  HEART: Tachycardic but regular rhythm  LUNGS:  Increased WOB with tachypnea  ABDOMEN:  Soft, nontender, nondistended   :badillo in place, normal penis  EXTREMITIES:  No edema, no gross deformities   NEUROLOGICAL: Sedated, off sedation moves extremities and sits up but does not follow commands  Skin:  Warm and dry without any rash    Radiology and Labs     Results from last 7 days   Lab Units 02/04/25  0518 02/03/25 2035 02/03/25  1716 02/03/25  1710   WBC 10*3/mm3 18.70*  --  25.64*  --    HEMOGLOBIN g/dL 12.0*  --  12.6*  --    HEMOGLOBIN, POC g/dL  --  13.2  --  13.9   HEMATOCRIT % 37.6  --  40.6  --     HEMATOCRIT POC %  --  39  --  41   PLATELETS 10*3/mm3 330  --  336  --       Results from last 7 days   Lab Units 02/04/25  0518 02/03/25  1716   PROTIME Seconds  --  15.3*   INR   --  1.21*   APTT seconds 32.9 >200.0*      Results from last 7 days   Lab Units 02/04/25 0518 02/03/25 2320 02/03/25 2035 02/03/25  1716 02/03/25  1710   SODIUM mmol/L 139  --   --  135*  --    POTASSIUM mmol/L 4.7 5.7*  --  5.1  --    CHLORIDE mmol/L 105  --   --  101  --    CO2 mmol/L 21.1*  --   --  25.4  --    BUN mg/dL 20  --   --  11  --    CREATININE mg/dL 1.02  --  1.19 0.91 0.89   GLUCOSE mg/dL 209*  --   --  186*  --    MAGNESIUM mg/dL 2.5*  --   --  2.3  --    PHOSPHORUS mg/dL 3.7  --   --  5.1*  --       Results from last 7 days   Lab Units 02/04/25 0518 02/03/25  1716   ALK PHOS U/L 58 66   AST (SGOT) U/L 183* 84*   ALT (SGPT) U/L 49* 30     Results from last 7 days   Lab Units 02/04/25  0733 02/04/25  0250 02/03/25  2355 02/03/25  2248 02/03/25  2143   PH, ARTERIAL pH units 7.358 7.295* 7.160* 7.123* 7.153*   PCO2, ARTERIAL mm Hg 42.9 49.1* 65.0* 78.0* 71.3*   PO2 ART mm Hg 180.0* 97.0 143.1* 74.5* 100.0   O2 SATURATION ART % 99.5* 96.6 98.3* 88.0* 95.1   FIO2 % 50 60 60 50 50   HCO3 ART mmol/L 24.1 23.9 23.2 25.5 25.0   BASE EXCESS ART mmol/L -1.4* -3.1* -6.5* -5.7* -5.4*       XR Chest 1 View    Result Date: 2/4/2025  Impression: 1.New right internal jugular central venous catheter terminates in the mid SVC. 2.Gastric suction tube terminates in the stomach with the sideport just below the gastroesophageal junction. 3.Stable endotracheal tube. 4.No acute cardiopulmonary abnormality. Electronically Signed: Alvaro Poe MD  2/4/2025 5:11 AM EST  Workstation ID: [id number]    XR Abdomen KUB    Result Date: 2/4/2025  Impression: Gastric suction tube terminates in the proximal stomach. Electronically Signed: Alvaro Poe MD  2/4/2025 1:52 AM EST  Workstation ID: [id number]    XR Chest 1 View    Result Date:  2/3/2025  Impression: 1.The tip of the endotracheal tube is in good position terminating at the level of the aortic knob. 2.Emphysema. Electronically Signed: Perez Veronica MD  2/3/2025 5:50 PM EST  Workstation ID: [id number]     Current medications     Scheduled Meds:   [START ON 2/5/2025] aspirin, 81 mg, Nasogastric, Daily  budesonide, 0.5 mg, Nebulization, BID - RT  cefTRIAXone, 2,000 mg, Intravenous, Q24H  chlorhexidine, 15 mL, Mouth/Throat, Q12H  enoxaparin, 40 mg, Subcutaneous, Q24H  insulin lispro, 4-24 Units, Subcutaneous, Q6H  ipratropium-albuterol, 3 mL, Nebulization, Q4H - RT  [START ON 2/5/2025] lansoprazole, 30 mg, Nasogastric, Q AM  [Held by provider] lisinopril, 10 mg, Oral, Daily  methylPREDNISolone sodium succinate, 40 mg, Intravenous, Daily  [Held by provider] metoprolol succinate XL, 25 mg, Oral, Daily  mupirocin, 1 Application, Each Nare, BID  polyethylene glycol, 17 g, Oral, Daily  sodium chloride, 10 mL, Intravenous, Q12H  ticagrelor, 90 mg, Nasogastric, BID        Continuous Infusions:   dexmedetomidine, 0.2-1.5 mcg/kg/hr, Last Rate: 1.5 mcg/kg/hr (02/04/25 06)  fentanyl 10 mcg/mL,  mcg/hr  norepinephrine, 0.02-0.5 mcg/kg/min, Last Rate: 0.2 mcg/kg/min (02/04/25 0756)  propofol, 5-50 mcg/kg/min, Last Rate: 50 mcg/kg/min (02/04/25 0753)          Plan discussed with RN. Reviewed all other data in the last 24 hours, including but not limited to vitals, labs, microbiology, imaging and pertinent notes from other providers.  Plan also discussed with patient's wife at the bedside.    Total critical care time: Approximately 35 minutes    Due to a high probability of clinically significant, life threatening deterioration, the patient required my highest level of preparedness to intervene emergently and I personally spent this critical care time directly and personally managing the patient. This critical care time included obtaining a history; examining the patient; pulse oximetry; ordering and  review of studies; arranging urgent treatment with development of a management plan; evaluation of patient's response to treatment; frequent reassessment; and, discussions with other providers.    This critical care time was performed to assess and manage the high probability of imminent, life-threatening deterioration that could result in multi-organ failure. It was exclusive of separately billable procedures and treating other patients and teaching time.    Dante Truong MD   Critical Care  02/04/25   09:48 EST

## 2025-02-04 NOTE — PROGRESS NOTES
Cardiology Progress  Note      Patient Care Team:  Deanne Perrin APRN as PCP - General (Nurse Practitioner)  Jm Gaona MD as Consulting Physician (Pulmonary Disease)    PATIENT IDENTIFICATION  Name: Chu Peralta  Age: 59 y.o.  Sex: male  :  1965  MRN: [id number]      Length of stay:    LOS: 1 day           REASON FOR FOLLOW-UP:  Acute inferior wall ST elevation MI  Severe single vessel coronary artery disease  Severe LV dysfunction        INTERVAL HISTORY  Patient seen and examined, chart and labs reviewed.  Patient remains intubated and sedated in the intensive care unit.  Family members at bedside.  On vasopressor support-levo at 0.24 mcg/kg/h.  Also on fentanyl/propofol drips.  Rhythm sinus tachycardia at 125 bpm.  Blood pressure 120 systolic.      SUBJECTIVE    Unable to obtain subjective data secondary to intubated/sedated status      REVIEW OF SYSTEMS:  Review of systems could not be obtained due to   patient intubated.    OBJECTIVE   Lactate 4.2 (3.9)      ASSESSMENT  Acute inferior wall ST elevation MI status post PCI-RCA  Severe global LV systolic dysfunction/ischemic dilated cardiomyopathy  Acute respiratory failure requiring endotracheal intubation  Acute on chronic COPD exacerbation  Influenza A infection  History of chronic hypoxic respiratory failure on home O2  Hypotension requiring vasopressor support  Peripheral arterial disease-severe iliofemoral disease bilaterally  Heart failure with reduced ejection fraction  Shock-distributive/cardiovascular      RECOMMENDATIONS  Intensivist managing critical care needs.  Extubate as tolerated.  Continue uninterrupted dual antiplatelet therapy consisting of aspirin and Brilinta.  Unable to initiate goal-directed medical therapy due to hypotension requiring vasopressor support.  Continue statin.  Monitor and replete electrolytes per protocol.  Cath findings and plan of care reviewed with the wife and daughter at bedside.  Continue close  "monitoring          CHF Guideline Directed Medical Therapy  Beta Blocker:   ARNI/ACE/ARB:   SGLT 2 inhibitors:   Diuretics:   Aldosterone Antagonist:   Vasodilators & Nitrates:       Vital Signs  Visit Vitals  /79   Pulse 103   Temp 98.6 °F (37 °C)   Resp 28   Ht 170.2 cm (67\")   Wt 75.5 kg (166 lb 7.2 oz)   SpO2 97%   BMI 26.07 kg/m²     Oxygen Therapy  SpO2: 97 %  Pulse Oximetry Type: Continuous  Device (Oxygen Therapy): ventilator  Oxygen Concentration (%): 40  Flowsheet Rows      Flowsheet Row First Filed Value   Admission Height 172.7 cm (68\") Documented at 02/03/2025 1651   Admission Weight 76 kg (167 lb 8.8 oz) Documented at 02/03/2025 1651          Intake & Output (last 3 days)         02/01 0701 02/02 0700 02/02 0701 02/03 0700 02/03 0701  02/04 0700 02/04 0701  02/05 0700    P.O.   0     I.V. (mL/kg)   1295 (17.2)     Total Intake(mL/kg)   1295 (17.2)     Urine (mL/kg/hr)   900     Emesis/NG output   0     Stool   0     Total Output   900     Net   +395             Urine Unmeasured Occurrence   0 x     Stool Unmeasured Occurrence   0 x     Emesis Unmeasured Occurrence   0 x           Lines, Drains & Airways       Active LDAs       Name Placement date Placement time Site Days    CVC Triple Lumen 02/04/25 Right Internal jugular 02/04/25  0400  Internal jugular  less than 1    Peripheral IV 02/03/25 1652 Distal;Left;Posterior Forearm 02/03/25  1652  Forearm  less than 1    Peripheral IV 02/03/25 1655 Anterior;Distal;Right;Upper Arm 02/03/25  1655  Arm  less than 1    NG/OG Tube Nasogastric 16 Fr Left nostril 02/03/25  1734  Left nostril  less than 1    Urethral Catheter 02/04/25  0500  -- less than 1    Hi-Lo Evac ETT 8 02/03/25  --  Oral  1    Arterial Line 02/04/25 Right Radial 02/04/25  0400  Radial  less than 1    Arterial Sheath 6 Fr. Right Femoral 02/03/25  1904  Femoral  less than 1    Venous Sheath 6 Fr. Right Femoral 02/03/25  1903  Femoral  less than 1                           /79   " "Pulse 103   Temp 98.6 °F (37 °C)   Resp 28   Ht 170.2 cm (67\")   Wt 75.5 kg (166 lb 7.2 oz)   SpO2 97%   BMI 26.07 kg/m²   Intake/Output last 3 shifts:  I/O last 3 completed shifts:  In: 1295 [I.V.:1295]  Out: 900 [Urine:900]  Intake/Output this shift:  No intake/output data recorded.    PHYSICAL EXAM:    General: Well-developed, well-nourished, critically ill 59-year-old male who is intubated and sedated in the intensive care unit  Head:  Normocephalic, atraumatic, mucous membranes moist, endotracheal tube noted  Eyes:  Conjunctivae/corneas clear     Neck:  Supple,  no adenopathy; no JVD or bruit  Lungs: Coarse mechanical  Chest wall: No tenderness  Heart::  Regular rate and rhythm, tachycardic, S1 and S2 normal, no murmur, rub or gallop  Abdomen: Soft, nondistended, bowel sounds active  Extremities: No cyanosis, clubbing, or edema   Pulses: 2+ and symmetric all extremities  Skin:  No rashes or lesions  Neuro/psych: Sedated        Scheduled Meds:      aspirin, 81 mg, Nasogastric, Daily  atorvastatin, 40 mg, Nasogastric, Nightly  budesonide, 0.5 mg, Nebulization, BID - RT  cefTRIAXone, 2,000 mg, Intravenous, Q24H  chlorhexidine, 15 mL, Mouth/Throat, Q12H  enoxaparin, 40 mg, Subcutaneous, Q24H  escitalopram, 20 mg, Nasogastric, Daily  insulin lispro, 4-24 Units, Subcutaneous, Q6H  ipratropium-albuterol, 3 mL, Nebulization, Q4H - RT  [START ON 2/5/2025] lansoprazole, 30 mg, Nasogastric, Q AM  [Held by provider] lisinopril, 10 mg, Oral, Daily  methylPREDNISolone sodium succinate, 40 mg, Intravenous, Daily  [Held by provider] metoprolol succinate XL, 25 mg, Oral, Daily  mupirocin, 1 Application, Each Nare, BID  polyethylene glycol, 17 g, Oral, Daily  sodium chloride, 10 mL, Intravenous, Q12H  thiamine (B-1) IV, 200 mg, Intravenous, Q8H  ticagrelor, 90 mg, Nasogastric, BID        Continuous Infusions:    dexmedetomidine, 0.2-1.5 mcg/kg/hr, Last Rate: Stopped (02/04/25 5955)  fentanyl 10 mcg/mL,  mcg/hr, Last " Rate: 65 mcg/hr (02/04/25 1253)  norepinephrine, 0.02-0.5 mcg/kg/min, Last Rate: 0.24 mcg/kg/min (02/04/25 1251)  propofol, 5-50 mcg/kg/min, Last Rate: 50 mcg/kg/min (02/04/25 1005)        PRN Meds:      acetaminophen    aluminum-magnesium hydroxide-simethicone    senna-docusate sodium **AND** polyethylene glycol **AND** bisacodyl **AND** bisacodyl    dextrose    dextrose    diphenhydrAMINE    fentaNYL citrate (PF)    glucagon (human recombinant)    ipratropium-albuterol    nicotine    nitroglycerin    ondansetron ODT **OR** ondansetron    sodium chloride    sodium chloride        Results Review:     I reviewed the patient's new clinical results.    CBC    Results from last 7 days   Lab Units 02/04/25 0518 02/03/25 2035 02/03/25 1716 02/03/25  1710   WBC 10*3/mm3 18.70*  --  25.64*  --    HEMOGLOBIN g/dL 12.0*  --  12.6*  --    HEMOGLOBIN, POC g/dL  --  13.2  --  13.9   PLATELETS 10*3/mm3 330  --  336  --      Cr Clearance Estimated Creatinine Clearance: 81.2 mL/min (by C-G formula based on SCr of 1.02 mg/dL).  Coag   Results from last 7 days   Lab Units 02/04/25 0518 02/03/25 1716   INR   --  1.21*   APTT seconds 32.9 >200.0*     HbA1C   Lab Results   Component Value Date    HGBA1C 6.06 (H) 02/03/2025     Blood Glucose   Glucose   Date/Time Value Ref Range Status   02/04/2025 1233 238 (H) 70 - 105 mg/dL Final     Comment:     Serial Number: [id number]:  [id number]   02/04/2025 0759 206 (H) 70 - 105 mg/dL Final     Comment:     Serial Number: [id number]:  [id number]   02/04/2025 0531 209 (H) 70 - 105 mg/dL Final     Comment:     Serial Number: [id number]:  [id number]   02/04/2025 0150 179 (H) 70 - 105 mg/dL Final     Comment:     Serial Number: [id number]:  [id number]   02/03/2025 2143 200 (H) 74 - 100 mg/dL Final     Comment:     Serial Number: [id number]:  [id number]   02/03/2025 2035 186 (H) 74 - 100 mg/dL Final   02/03/2025 2035 186 (H) 74 - 100 mg/dL Final     Comment:     Serial  "Number: [id number]:  [id number]   02/03/2025 1710 213 (H) 74 - 100 mg/dL Final   02/03/2025 1710 213 (H) 74 - 100 mg/dL Final     Comment:     Serial Number: [id number]:  [id number]     Infection   Results from last 7 days   Lab Units 02/03/25 1716   PROCALCITONIN ng/mL 37.40*     CMP   Results from last 7 days   Lab Units 02/04/25  0518 02/03/25 2320 02/03/25 2035 02/03/25 1716 02/03/25 1710   SODIUM mmol/L 139  --   --  135*  --    POTASSIUM mmol/L 4.7 5.7*  --  5.1  --    CHLORIDE mmol/L 105  --   --  101  --    CO2 mmol/L 21.1*  --   --  25.4  --    BUN mg/dL 20  --   --  11  --    CREATININE mg/dL 1.02  --  1.19 0.91 0.89   GLUCOSE mg/dL 209*  --   --  186*  --    ALBUMIN g/dL 3.8  --   --  4.0  --    BILIRUBIN mg/dL <0.2  --   --  0.2  --    ALK PHOS U/L 58  --   --  66  --    AST (SGOT) U/L 183*  --   --  84*  --    ALT (SGPT) U/L 49*  --   --  30  --    AMYLASE U/L  --   --   --  64  --    LIPASE U/L  --   --   --  20  --      ABG    Results from last 7 days   Lab Units 02/04/25  0733 02/04/25  0250 02/03/25 2355 02/03/25 2248 02/03/25 2143 02/03/25 2035 02/03/25 1710   PH, ARTERIAL pH units 7.358 7.295* 7.160* 7.123* 7.153* 7.117* 7.147*   PCO2, ARTERIAL mm Hg 42.9 49.1* 65.0* 78.0* 71.3* 78.1* 78.8*   PO2 ART mm Hg 180.0* 97.0 143.1* 74.5* 100.0 172.9* 164.5*   O2 SATURATION ART % 99.5* 96.6 98.3* 88.0* 95.1 98.9* 98.8*   BASE EXCESS ART mmol/L -1.4* -3.1* -6.5* -5.7* -5.4* -5.8* -3.6*     UA    Results from last 7 days   Lab Units 02/03/25  1730   NITRITE UA  Negative   WBC UA /HPF 0-2   BACTERIA UA /HPF None Seen   SQUAM EPITHEL UA /HPF None Seen     LILIANE  No results found for: \"POCMETH\", \"POCAMPHET\", \"POCBARBITUR\", \"POCBENZO\", \"POCCOCAINE\", \"POCOPIATES\", \"POCOXYCODO\", \"POCPHENCYC\", \"POCPROPOXY\", \"POCTHC\", \"POCTRICYC\"  Lysis Labs   Results from last 7 days   Lab Units 02/04/25  0518 02/03/25  2035 02/03/25  1716 02/03/25  1710   INR   --   --  1.21*  --    APTT seconds 32.9  --  >200.0*  --  "   HEMOGLOBIN g/dL 12.0*  --  12.6*  --    HEMOGLOBIN, POC g/dL  --  13.2  --  13.9   PLATELETS 10*3/mm3 330  --  336  --    CREATININE mg/dL 1.02 1.19 0.91 0.89     Radiology(recent) XR Chest 1 View    Result Date: 2/4/2025  Impression: 1.New right internal jugular central venous catheter terminates in the mid SVC. 2.Gastric suction tube terminates in the stomach with the sideport just below the gastroesophageal junction. 3.Stable endotracheal tube. 4.No acute cardiopulmonary abnormality. Electronically Signed: Alvaro Poe MD  2/4/2025 5:11 AM EST  Workstation ID: [id number]    XR Abdomen KUB    Result Date: 2/4/2025  Impression: Gastric suction tube terminates in the proximal stomach. Electronically Signed: Alvaro Poe MD  2/4/2025 1:52 AM EST  Workstation ID: [id number]    XR Chest 1 View    Result Date: 2/3/2025  Impression: 1.The tip of the endotracheal tube is in good position terminating at the level of the aortic knob. 2.Emphysema. Electronically Signed: Perez Veronica MD  2/3/2025 5:50 PM EST  Workstation ID: [id number]       Results from last 7 days   Lab Units 02/04/25  0518 02/03/25  1716   CK TOTAL U/L  --  1,076*   HSTROP T ng/L 2,970* 1,288*       X-rays, labs reviewed personally by physician.    ECG/EMG Results (most recent)       Procedure Component Value Units Date/Time    ECG 12 Lead Other; elevated troponin [[id number]] Collected: 02/03/25 1739     Updated: 02/03/25 1740     QT Interval 317 ms      QTC Interval 440 ms     Narrative:      HEART JFEG=352  bpm  RR Mnbhutcc=825  ms  NH Ptcyyfuq=204  ms  P Horizontal Axis=6  deg  P Front Axis=74  deg  QRSD Interval=84  ms  QT Lojggtxz=164  ms  VDjQ=388  ms  QRS Axis=69  deg  T Wave Axis=60  deg  - ABNORMAL ECG -  Sinus tachycardia  Ventricular premature complex  Low voltage, precordial leads  Probable anteroseptal infarct, old  ST elevation, consider inferior injury  Date and Time of Study:2025-02-03 17:39:35    Adult Transthoracic Echo Complete w/ Color,  Spectral and Contrast if Necessary Per Protocol [[id number]] Resulted: 02/03/25 2054     Updated: 02/03/25 2054     LVIDd 4.7 cm      LVIDs 4.1 cm      IVSd 1.00 cm      LVPWd 1.00 cm      FS 12.8 %      IVS/LVPW 1.00 cm      ESV(cubed) 68.9 ml      EDV(cubed) 103.8 ml      LV mass(C)d 164.5 grams      LVOT area 2.8 cm2      LVOT diam 1.90 cm      EDV(MOD-sp4) 124.0 ml      ESV(MOD-sp4) 90.1 ml      SV(MOD-sp4) 33.9 ml      EF(MOD-sp4) 27.3 %      MV E max trevon 42.3 cm/sec      MV A max trevon 62.0 cm/sec      MV dec time 0.14 sec      MV E/A 0.68     Med Peak E' Trevon 5.3 cm/sec      Lat Peak E' Trevon 5.2 cm/sec      TR max trevon 261.0 cm/sec      Avg E/e' ratio 8.06     SV(LVOT) 24.0 ml      TAPSE (>1.6) 1.56 cm      RV S' 12.3 cm/sec      LA dimension (2D)  3.3 cm      LV V1 max 57.9 cm/sec      LV V1 max PG 1.34 mmHg      LV V1 mean PG 1.00 mmHg      LV V1 VTI 8.5 cm      Ao pk trevon 88.2 cm/sec      Ao max PG 3.1 mmHg      Ao mean PG 2.00 mmHg      Ao V2 VTI 13.7 cm      EFREM(I,D) 1.75 cm2      AI P1/2t 374.7 msec      MV max PG 1.54 mmHg      MV mean PG 1.00 mmHg      MV V2 VTI 8.2 cm      MV P1/2t 16.1 msec      MVA(P1/2t) 13.7 cm2      MVA(VTI) 2.9 cm2      MV dec slope 1,020 cm/sec2      MR max trevon 215.0 cm/sec      MR max PG 18.5 mmHg      TR max PG 27.2 mmHg      RVSP(TR) 30.2 mmHg      RAP systole 3.0 mmHg      RV V1 max PG 0.58 mmHg      RV V1 max 38.1 cm/sec      RV V1 VTI 4.6 cm      PA V2 max 57.6 cm/sec      PA acc time 0.12 sec      ACS 2.30 cm      Sinus 3.7 cm      EF(MOD-bp) 27.0 %      Dimensionless Index 0.72 (DI)     Narrative:        Left ventricular systolic function is severely decreased. Left   ventricular ejection fraction appears to be 26 - 30%.    Left ventricular diastolic function is consistent with (grade I)   impaired relaxation.    Moderately reduced right ventricular systolic function noted.    The right ventricular cavity is mildly dilated.    Estimated right ventricular systolic pressure  from tricuspid   regurgitation is normal (<35 mmHg). Calculated right ventricular systolic   pressure from tricuspid regurgitation is 30 mmHg.      ECG 12 Lead Pre-Op / Pre-Procedure [[id number]] Collected: 02/04/25 0526     Updated: 02/04/25 0528     QT Interval 312 ms      QTC Interval 425 ms     Narrative:      HEART RWVW=226  bpm  RR Lnlgmggu=047  ms  AR Lyplnyjw=346  ms  P Horizontal Axis=-16  deg  P Front Axis=82  deg  QRSD Interval=81  ms  QT Zazcwwev=147  ms  PGkX=206  ms  QRS Axis=76  deg  T Wave Axis=72  deg  - ABNORMAL ECG -  Sinus tachycardia  Probable inferior infarct, recent  Consider anterior infarct  Date and Time of Study:2025-02-04 05:26:28    Telemetry Scan [[id number]] Resulted: 02/03/25     Updated: 02/04/25 0912    Telemetry Scan [[id number]] Resulted: 02/03/25     Updated: 02/04/25 0942    Telemetry Scan [[id number]] Resulted: 02/03/25     Updated: 02/04/25 1117    Telemetry Scan [[id number]] Resulted: 02/03/25     Updated: 02/04/25 1229              Medication Review:   I have reviewed the patient's current medication list  Scheduled Meds:aspirin, 81 mg, Nasogastric, Daily  atorvastatin, 40 mg, Nasogastric, Nightly  budesonide, 0.5 mg, Nebulization, BID - RT  cefTRIAXone, 2,000 mg, Intravenous, Q24H  chlorhexidine, 15 mL, Mouth/Throat, Q12H  enoxaparin, 40 mg, Subcutaneous, Q24H  escitalopram, 20 mg, Nasogastric, Daily  insulin lispro, 4-24 Units, Subcutaneous, Q6H  ipratropium-albuterol, 3 mL, Nebulization, Q4H - RT  [START ON 2/5/2025] lansoprazole, 30 mg, Nasogastric, Q AM  [Held by provider] lisinopril, 10 mg, Oral, Daily  methylPREDNISolone sodium succinate, 40 mg, Intravenous, Daily  [Held by provider] metoprolol succinate XL, 25 mg, Oral, Daily  mupirocin, 1 Application, Each Nare, BID  polyethylene glycol, 17 g, Oral, Daily  sodium chloride, 10 mL, Intravenous, Q12H  thiamine (B-1) IV, 200 mg, Intravenous, Q8H  ticagrelor, 90 mg, Nasogastric, BID      Continuous Infusions:dexmedetomidine,  0.2-1.5 mcg/kg/hr, Last Rate: Stopped (02/04/25 0957)  fentanyl 10 mcg/mL,  mcg/hr, Last Rate: 65 mcg/hr (02/04/25 1253)  norepinephrine, 0.02-0.5 mcg/kg/min, Last Rate: 0.24 mcg/kg/min (02/04/25 1251)  propofol, 5-50 mcg/kg/min, Last Rate: 50 mcg/kg/min (02/04/25 1005)      PRN Meds:.  acetaminophen    aluminum-magnesium hydroxide-simethicone    senna-docusate sodium **AND** polyethylene glycol **AND** bisacodyl **AND** bisacodyl    dextrose    dextrose    diphenhydrAMINE    fentaNYL citrate (PF)    glucagon (human recombinant)    ipratropium-albuterol    nicotine    nitroglycerin    ondansetron ODT **OR** ondansetron    sodium chloride    sodium chloride    Imaging:  Imaging Results (Last 72 Hours)       Procedure Component Value Units Date/Time    XR Chest 1 View [[id number]] Collected: 02/04/25 0510     Updated: 02/04/25 0514    Narrative:      XR CHEST 1 VW    Date of Exam: 2/4/2025 4:50 AM EST    Indication: line placement, resp failure (no need to repeat AM cxr for resp failure)    Comparison: 2/3/2025.    Findings:  Stable endotracheal tube. Gastric suction tube courses below the diaphragm and terminates in the stomach with the sideport just below the gastroesophageal junction. There is a new right internal jugular central venous catheter terminating in the mid SVC.   The lungs are clear. No pneumothorax or pleural effusion. Heart size and pulmonary vasculature appear within normal limits. No acute osseous abnormalities.      Impression:      Impression:  1.New right internal jugular central venous catheter terminates in the mid SVC.  2.Gastric suction tube terminates in the stomach with the sideport just below the gastroesophageal junction.  3.Stable endotracheal tube.  4.No acute cardiopulmonary abnormality.          Electronically Signed: Alvaro Poe MD    2/4/2025 5:11 AM EST    Workstation ID: [id number]    XR Abdomen KUB [[id number]] Collected: 02/04/25 0151     Updated: 02/04/25 0154     "Narrative:      XR ABDOMEN KUB    Date of Exam: 2/4/2025 1:48 AM EST    Indication: NG placement    Comparison: Correlation with CT PE study 2/3/2025.    Findings:  Gastric suction tube terminates in the proximal stomach. The sideport is just below the GE junction. No distended bowel in the upper abdomen.      Impression:      Impression:  Gastric suction tube terminates in the proximal stomach.          Electronically Signed: Alvaro Poe MD    2/4/2025 1:52 AM EST    Workstation ID: [id number]    XR Chest 1 View [[id number]] Collected: 02/03/25 1748     Updated: 02/03/25 1752    Narrative:      XR CHEST 1 VW    Date of Exam: 2/3/2025 5:19 PM EST    Indication: Acute on chronic respiratory failure, assess endotracheal tube position.    Comparison: 2/3/2025 performed at [facility].    Findings:  The tip of the endotracheal tube is in good position terminating at the level of the aortic knob. The heart size is normal. There is underlying emphysema. The pulmonary vascular markings are normal. The lungs and pleural spaces are clear. There are mild   chronic age-related changes involving the bony thorax.      Impression:      Impression:  1.The tip of the endotracheal tube is in good position terminating at the level of the aortic knob.  2.Emphysema.        Electronically Signed: Perez Veronica MD    2/3/2025 5:50 PM EST    Workstation ID: [id number]              ROLA Hendricks  02/04/25  13:42 EST       EMR Dragon/Transcription:   \"Dictated utilizing Dragon dictation\".       Electronically signed by ROLA Hendricks, 02/04/25, 1:42 PM EST.  Copied text in this note has been reviewed by me and is accurate as of 02/04/25.    Cardiology attending:  Seen and examined.  Chart and labs reviewed. Independent interpretations of cardiac testing was performed. History and exam findings are verified with above changes noted.  Assessment and plan notated by APC after being formulated by attending " consultant.  Note that greater than 50% of the time spent in care of the patient was provided by attending consultant.     Patient remains on ventilator and on pressors.  Discussed with intensivist medicine.  Will add dobutamine to see if this helps with cardiac output.  40% FiO2.  Continue to monitor rate and rhythm closely.  Continue with DAPT therapy.     Physical Exam:     General:Alert, cooperative, no distress, appears stated age  Head:               Normocephalic, atraumatic, mucous membranes moist  Eyes:               Conjunctivae/corneas clear, EOM's intact     Neck:               Supple,  no bruit  Lungs:           Coarse and mechanical  Chest wall:No tenderness  Heart::             Regular rate and rhythm, S1 and S2 normal, 1/6 holosystolic murmur.  No rub or gallop  Abdomen:        Soft, nontender, nondistended bowel sounds active.  Groin soft no hematoma  Extremities:No cyanosis, clubbing, or edema  Pulses:Diminished pedal pulses  Skin:                No rashes or lesions  Neuro/psych:Sedated on ventilator

## 2025-02-04 NOTE — PHARMACY PATIENT ASSISTANCE
"Transitions of Care (test claim):    Patient insurance type: Medicare - this means they are NOT eligible for a monthly discount card in the future (see \"free month\" note below)  Is patient signed up for M2B? no    Drug Ticagrelor: 90 mg PO BID   Covered/PA Required: Covered without PA  Copay Per Month: $12.15  Is the medication eligible for a trial card/copay card? Free trial available from         This test claim coverage is only valid on the date the note is published. Copay/coverage could vary depending on patient coverage at a later date.  Additionally this test claim is for the sole purpose of a price check not a clinical recommendation.     For billing questions please call [facility] Pharmacist at ext: [phone]  For M2B questions please call [facility] at ext: [phone]      Kaleb Gale PharmD, BCPS      "

## 2025-02-04 NOTE — CONSULTS
"Nutrition Services    Patient Name: Chu Peralta  YOB: 1965  MRN: [id number]  Admission date: 2/3/2025    Comment:  -- Begin Peptamen Intense VHP at 20 mL/hour + 20 mL/hour water flush       CLINICAL NUTRITION ASSESSMENT      Reason for Assessment 2/4: Consult for tube feeding      H&P      Past Medical History:   Diagnosis Date    Anxiety     COPD (chronic obstructive pulmonary disease)     Depression     Emphysema lung     Hypertension     Lung nodule     Restless leg        Past Surgical History:   Procedure Laterality Date    CARDIAC CATHETERIZATION N/A 2/3/2025    Procedure: Left Heart Cath;  Surgeon: Dale Peterson DO;  Location: [facility] CATH INVASIVE LOCATION;  Service: Cardiovascular;  Laterality: N/A;    CARDIAC CATHETERIZATION N/A 2/3/2025    Procedure: Percutaneous Coronary Intervention;  Surgeon: Dale Peterson DO;  Location: [facility] CATH INVASIVE LOCATION;  Service: Cardiovascular;  Laterality: N/A;    CARDIAC CATHETERIZATION N/A 2/3/2025    Procedure: Stent SHELLY coronary;  Surgeon: Dale Peterson DO;  Location: [facility] CATH INVASIVE LOCATION;  Service: Cardiovascular;  Laterality: N/A;  RCA        Current Problems   Acute on chronic respiratory failure with hypoxia and hypercapnia  - intubated 2/3    STEMI     Essential Hypertension     Hyperglycemia, no previous diagnosis of diabetes mellitus       Encounter Information        Trending Narrative     2/4: Admitted for respiratory distress.  Intubated on admission.  Patient discussed in AM rounds.  On propofol (22.7 mL/hour providing 599 kcal/day) and levo.  MD consulted RD for tube feeding.  RD visited patient at bedside.  NFPE completed and not consistent with nutrition diagnosis of malnutrition at this time using AND/ASPEN criteria but noted with significant temporal muscle loss (particularly his left temple).         Anthropometrics        Current Height, Weight Height: 170.2 cm (67\")  Weight: " 75.5 kg (166 lb 7.2 oz) (02/04/25 0600)       Usual Body Weight (UBW) Unable to obtain from patient        Trending Weight Hx     This admission: 2/4: 166#             PTA: No weight history to note     Wt Readings from Last 30 Encounters:   02/04/25 0600 75.5 kg (166 lb 7.2 oz)   02/03/25 1831 75.8 kg (167 lb)   02/03/25 1700 76 kg (167 lb 8.8 oz)   02/03/25 1651 76 kg (167 lb 8.8 oz)      BMI kg/m2 Body mass index is 26.07 kg/m².       Labs        Pertinent Labs    Results from last 7 days   Lab Units 02/04/25 0518 02/03/25 2320 02/03/25 2035 02/03/25  1716   SODIUM mmol/L 139  --   --  135*   POTASSIUM mmol/L 4.7 5.7*  --  5.1   CHLORIDE mmol/L 105  --   --  101   CO2 mmol/L 21.1*  --   --  25.4   BUN mg/dL 20  --   --  11   CREATININE mg/dL 1.02  --  1.19 0.91   CALCIUM mg/dL 8.7  --   --  8.7   BILIRUBIN mg/dL <0.2  --   --  0.2   ALK PHOS U/L 58  --   --  66   ALT (SGPT) U/L 49*  --   --  30   AST (SGOT) U/L 183*  --   --  84*   GLUCOSE mg/dL 209*  --   --  186*     Results from last 7 days   Lab Units 02/04/25 0518 02/03/25 2035 02/03/25  1716   MAGNESIUM mg/dL 2.5*  --  2.3   PHOSPHORUS mg/dL 3.7  --  5.1*   HEMOGLOBIN g/dL 12.0*  --  12.6*   HEMOGLOBIN, POC   --    < >  --    HEMATOCRIT % 37.6  --  40.6   HEMATOCRIT POC   --    < >  --    TRIGLYCERIDES mg/dL  --   --  49    < > = values in this interval not displayed.     Lab Results   Component Value Date    HGBA1C 6.06 (H) 02/03/2025        Medications    Scheduled Medications aspirin, 81 mg, Nasogastric, Daily  atorvastatin, 40 mg, Nasogastric, Nightly  budesonide, 0.5 mg, Nebulization, BID - RT  cefTRIAXone, 2,000 mg, Intravenous, Q24H  chlorhexidine, 15 mL, Mouth/Throat, Q12H  enoxaparin, 40 mg, Subcutaneous, Q24H  escitalopram, 20 mg, Nasogastric, Daily  insulin lispro, 4-24 Units, Subcutaneous, Q6H  ipratropium-albuterol, 3 mL, Nebulization, Q4H - RT  [START ON 2/5/2025] lansoprazole, 30 mg, Nasogastric, Q AM  [Held by provider] lisinopril, 10  mg, Oral, Daily  methylPREDNISolone sodium succinate, 40 mg, Intravenous, Daily  [Held by provider] metoprolol succinate XL, 25 mg, Oral, Daily  mupirocin, 1 Application, Each Nare, BID  polyethylene glycol, 17 g, Oral, Daily  sodium chloride, 10 mL, Intravenous, Q12H  ticagrelor, 90 mg, Nasogastric, BID        Infusions dexmedetomidine, 0.2-1.5 mcg/kg/hr, Last Rate: Stopped (02/04/25 0957)  fentanyl 10 mcg/mL,  mcg/hr, Last Rate: 75 mcg/hr (02/04/25 1045)  norepinephrine, 0.02-0.5 mcg/kg/min, Last Rate: 0.24 mcg/kg/min (02/04/25 1222)  propofol, 5-50 mcg/kg/min, Last Rate: 50 mcg/kg/min (02/04/25 1005)        PRN Medications   acetaminophen    aluminum-magnesium hydroxide-simethicone    senna-docusate sodium **AND** polyethylene glycol **AND** bisacodyl **AND** bisacodyl    dextrose    dextrose    diphenhydrAMINE    fentaNYL citrate (PF)    glucagon (human recombinant)    ipratropium-albuterol    nicotine    nitroglycerin    ondansetron ODT **OR** ondansetron    sodium chloride    sodium chloride     Physical Findings        Trending Physical   Appearance, NFPE 2/4: NFPE completed and not consistent with nutrition diagnosis of malnutrition at this time using AND/ASPEN criteria but noted with significant temporal muscle loss (particularly his left temple).       --  Edema  No feeding tube      Bowel Function No BM since admission (yesterday)     Tubes NG tube      Chewing/Swallowing Intubated      Skin Arm and gluteal areas    No WOCN note at this time        Estimated/Assessed Needs    Estimated 2/4/25   Energy Requirements    EST Needs, Method, Wt used 1900 kcal/day ([facility] 2003b with CBW 75.5 kg)  1086-8556 kcal/day (25-30 kcal/kg of CBW 75.5 kg)       Protein Requirements    EST Needs, Method, Wt used  g/day (1.2-2.0 g/kg of CBW 75.5 kg)       Fluid Requirements     Estimated Needs (mL/day) 1 mL/kcal, will monitor hydration status      Fluid Deficit    Current Na Level (mEq/L)    Desired Na Level  (mEq/L)    Estimated Fluid Deficit (L)       Current Nutrition Orders & Evaluation of Intake       Oral Nutrition     Food Allergies [place]   Current PO Diet NPO Diet NPO Type: Strict NPO   Supplement NKFA   PO Evaluation     Trending % PO Intake      Enteral Nutrition    Enteral Route NG tube    Order, Modulars, Flushes    Tolerance    TF Observation         Parenteral Nutrition     TPN Route    Total # Days on TPN    TPN Order, Lipid Details    MVI & Trace Element Freq    TPN Observation       Nutritional Risk Screening        NRS-2002 Score          Nutrition Diagnosis         Nutrition Dx Problem 1 Inadequate energy intake related to clinical course as evidenced by NPO.       Nutrition Dx Problem 2        Intervention Goal         Intervention Goal(s) Begin and tolerate EN  Stable weight     Nutrition Intervention        RD Action Order EN   Completed NFPE     Nutrition Prescription          Diet Prescription NPO   Supplement Prescription      Enteral Prescription Initial Goal:  *initial goal conservative d/t risk of RFS     Peptamen Intense VHP at 20 mL/hr + 20 mL/hr water flush      End Goal:    Peptamen Intense VHP at 60 mL/hr + water flush per clinical picture      Calories  1320 kcals TF  599 kcal propofol  1919 kcal total (in range)    Protein  121 g (in range)    Free water  1109 mL   Flushes  Will monitor hydration status      The above end goal rate is for 22 hrs/day to assume interruptions for ADLs. Water flushes adjusted based on clinical picture + Rx flushes/IV fluids     Specialized formula chosen r/t high propofol allowance.          TPN Prescription      Monitor/Evaluation        Monitor Per protocol, I&O, Pertinent labs, EN delivery/tolerance, Weight, Hemodynamic stability       Electronically signed by:  Luna Pete RD  02/04/25 12:48 EST

## 2025-02-04 NOTE — NURSING NOTE
This RN sent patient belongings (underwear and shorts) by tube system to CVU. Wife updated on room change to CVU via phone call.

## 2025-02-04 NOTE — PROCEDURES
Insert Arterial Line    Date/Time: 2/4/2025 4:04 AM    Performed by: Tanya Gunter APRN  Authorized by: Tanya Gunter APRN    Universal Protocol:     Written consent obtained?: Yes      Risks and benefits: Risks, benefits and alternatives were discussed      Consent given by:  Spouse    Procedure consent matches procedure scheduled: Yes      Required items: Required blood products, implants, devices and special equipment available      Patient identity confirmed:  Arm band, hospital-assigned identification number and anonymous protocol, patient vented/unresponsive    Time out: Immediately prior to the procedure a time out was called    A time out verifies correct patient, procedure, equipment, support staff and site/side marked as required:   Preparation:     Preparation: Patient was prepped and draped in usual sterile fashion    Indications:     Indications: hemodynamic monitoring    Location:     Location:  Right radial  Procedure Details:     Ultrasound Guidance: yes  The ultrasound was used for evaluation of possible access sites.  Vessel patency was confirmed with the ultrasound.  Needle entry into vessel was visualized in realtime with the ultrasound.       Needle gauge:  20    Seldinger technique: Seldinger technique used      Number of attempts:  1  Post-procedure:     Post-procedure:  Line sutured and dressing applied    Post-procedure CMS:  Normal     patient tolerated the procedure well with no immediate complications    Electronically signed by ROLA Lewis, 02/04/25, 4:38 AM EST.

## 2025-02-04 NOTE — NURSING NOTE
59-year-old male admitted to the hospital with acute on chronic respiratory failure.  A consult was received to specialist assess the patient sacral area.  Photos on the chart are noted and reviewed and recommendations are to implement hospital protocol at implement pressure injury prevention strategies r

## 2025-02-04 NOTE — NURSING NOTE
Wasted 25 ml Versed drip that the patient was on en route maintained by EMS. Wasted in med room with Gloria CARDOZO PharmD.

## 2025-02-04 NOTE — PLAN OF CARE
Goal Outcome Evaluation:        Problem: Restraint, Nonviolent  Goal: Absence of Harm or Injury  Outcome: Progressing  Intervention: Implement Least Restrictive Safety Strategies  Recent Flowsheet Documentation  Taken 2/4/2025 0600 by Dali Brink RN  Medical Device Protection:   IV pole/bag removed from visual field   torso covered   tubing secured  Diversional Activities: television  Taken 2/4/2025 0400 by Dali Brink RN  Medical Device Protection:   IV pole/bag removed from visual field   torso covered   tubing secured  Diversional Activities: television  Taken 2/4/2025 0200 by Dali Brink RN  Medical Device Protection:   skin sleeve   IV pole/bag removed from visual field   long sleeve gown  Diversional Activities: television  Taken 2/4/2025 0000 by Dali Brink RN  Medical Device Protection:   skin sleeve   IV pole/bag removed from visual field  Diversional Activities: television  Taken 2/3/2025 2200 by Dali Brink RN  Medical Device Protection:   IV pole/bag removed from visual field   torso covered   tubing secured  Diversional Activities: television  Taken 2/3/2025 2130 by Dali Brink RN  Medical Device Protection: IV pole/bag removed from visual field  Diversional Activities: television  Intervention: Protect Dignity, Rights and Personal Wellbeing  Recent Flowsheet Documentation  Taken 2/4/2025 0400 by Dali Brink RN  Trust Relationship/Rapport:   care explained   choices provided   emotional support provided   empathic listening provided   questions encouraged   reassurance provided   thoughts/feelings acknowledged  Taken 2/4/2025 0000 by Dali Brink RN  Trust Relationship/Rapport:   care explained   choices provided   questions answered   questions encouraged   reassurance provided   thoughts/feelings acknowledged  Taken 2/3/2025 2130 by Dali Brink RN  Trust Relationship/Rapport:   care explained   choices provided   emotional support provided   reassurance  provided   thoughts/feelings acknowledged  Intervention: Protect Skin and Joint Integrity  Recent Flowsheet Documentation  Taken 2/4/2025 0600 by Dali Brink RN  Body Position:   supine   weight shifting  Taken 2/4/2025 0400 by Dali Brink RN  Body Position:   supine   weight shifting  Skin Protection: incontinence pads utilized  Taken 2/4/2025 0300 by Dali Brink RN  Body Position:   supine   weight shifting  Taken 2/4/2025 0200 by Dali Brink RN  Body Position:   right   turned   weight shifting  Taken 2/4/2025 0100 by Dali Brink RN  Body Position:   right   turned   weight shifting  Taken 2/4/2025 0000 by Dali Brink RN  Body Position:   left   turned   weight shifting  Skin Protection: incontinence pads utilized  Taken 2/3/2025 2200 by Dali Brink RN  Body Position:   supine   weight shifting  Taken 2/3/2025 2130 by Dali Brink RN  Body Position:   supine   weight shifting  Skin Protection: incontinence pads utilized  Goal: Absence of Harm or Injury  Outcome: Progressing  Intervention: Implement Least Restrictive Safety Strategies  Recent Flowsheet Documentation  Taken 2/4/2025 0600 by Dali Brink RN  Medical Device Protection:   IV pole/bag removed from visual field   torso covered   tubing secured  Diversional Activities: television  Taken 2/4/2025 0400 by Dali Brink RN  Medical Device Protection:   IV pole/bag removed from visual field   torso covered   tubing secured  Diversional Activities: television  Taken 2/4/2025 0200 by Dali Brink RN  Medical Device Protection:   skin sleeve   IV pole/bag removed from visual field   long sleeve gown  Diversional Activities: television  Taken 2/4/2025 0000 by Dali Brink RN  Medical Device Protection:   skin sleeve   IV pole/bag removed from visual field  Diversional Activities: television  Taken 2/3/2025 2200 by Dali Brink RN  Medical Device Protection:   IV pole/bag removed from visual field   torso  covered   tubing secured  Diversional Activities: television  Taken 2/3/2025 2130 by Dali Brink RN  Medical Device Protection: IV pole/bag removed from visual field  Diversional Activities: television  Intervention: Protect Dignity, Rights and Personal Wellbeing  Recent Flowsheet Documentation  Taken 2/4/2025 0400 by Dali Brink RN  Trust Relationship/Rapport:   care explained   choices provided   emotional support provided   empathic listening provided   questions encouraged   reassurance provided   thoughts/feelings acknowledged  Taken 2/4/2025 0000 by Dali Brink RN  Trust Relationship/Rapport:   care explained   choices provided   questions answered   questions encouraged   reassurance provided   thoughts/feelings acknowledged  Taken 2/3/2025 2130 by Dali Brink RN  Trust Relationship/Rapport:   care explained   choices provided   emotional support provided   reassurance provided   thoughts/feelings acknowledged  Intervention: Protect Skin and Joint Integrity  Recent Flowsheet Documentation  Taken 2/4/2025 0600 by Dali Brink RN  Body Position:   supine   weight shifting  Taken 2/4/2025 0400 by Dali Brink RN  Body Position:   supine   weight shifting  Skin Protection: incontinence pads utilized  Taken 2/4/2025 0300 by Dali Brink RN  Body Position:   supine   weight shifting  Taken 2/4/2025 0200 by Dali Brink RN  Body Position:   right   turned   weight shifting  Taken 2/4/2025 0100 by Dali Brink RN  Body Position:   right   turned   weight shifting  Taken 2/4/2025 0000 by Dali Brink RN  Body Position:   left   turned   weight shifting  Skin Protection: incontinence pads utilized  Taken 2/3/2025 2200 by Dali Brink RN  Body Position:   supine   weight shifting  Taken 2/3/2025 2130 by Dali Brink RN  Body Position:   supine   weight shifting  Skin Protection: incontinence pads utilized

## 2025-02-04 NOTE — PROCEDURES
"Insert Central Line At Bedside    Date/Time: 2/4/2025 3:55 AM    Performed by: Tanya Gunter APRN  Authorized by: Tanya Gunter APRN  Consent: Written consent obtained.  Risks and benefits: risks, benefits and alternatives were discussed  Consent given by: spouse  Procedure consent: procedure consent matches procedure scheduled  Required items: required blood products, implants, devices, and special equipment available  Patient identity confirmed: arm band, hospital-assigned identification number and anonymous protocol, patient vented/unresponsive  Time out: Immediately prior to procedure a \"time out\" was called to verify the correct patient, procedure, equipment, support staff and site/side marked as required.  Indications: vascular access  Anesthesia: local infiltration    Anesthesia:  Local Anesthetic: lidocaine 2% without epinephrine  Anesthetic total: 5 mL  Preparation: skin prepped with ChloraPrep  Skin prep agent dried: skin prep agent completely dried prior to procedure  Sterile barriers: all five maximum sterile barriers used - cap, mask, sterile gown, sterile gloves, and large sterile sheet  Hand hygiene: hand hygiene performed prior to central venous catheter insertion  Location details: right internal jugular  Patient position: flat  Catheter type: triple lumen  Catheter size: 7 Fr  Ultrasound guidance: yes  Sterile ultrasound techniques: sterile gel and sterile probe covers were used  Number of attempts: 1  Successful placement: yes  Post-procedure: line sutured and dressing applied  Assessment: blood return through all ports and no pneumothorax on x-ray  Patient tolerance: patient tolerated the procedure well with no immediate complications      Electronically signed by ROLA Lewis, 02/04/25, 3:57 AM EST.    "

## 2025-02-05 ENCOUNTER — APPOINTMENT (OUTPATIENT)
Dept: GENERAL RADIOLOGY | Facility: HOSPITAL | Age: 60
DRG: 321 | End: 2025-02-05
Payer: MEDICARE

## 2025-02-05 LAB
ALBUMIN SERPL-MCNC: 3.1 G/DL (ref 3.5–5.2)
ALBUMIN/GLOB SERPL: 1.3 G/DL
ALP SERPL-CCNC: 48 U/L (ref 39–117)
ALT SERPL W P-5'-P-CCNC: 56 U/L (ref 1–41)
ANION GAP SERPL CALCULATED.3IONS-SCNC: 7.4 MMOL/L (ref 5–15)
ANION GAP SERPL CALCULATED.3IONS-SCNC: 8 MMOL/L (ref 5–15)
APTT PPP: 37.2 SECONDS (ref 22.7–35.4)
ARTERIAL PATENCY WRIST A: ABNORMAL
ARTERIAL PATENCY WRIST A: POSITIVE
ARTERIAL PATENCY WRIST A: POSITIVE
AST SERPL-CCNC: 169 U/L (ref 1–40)
ATMOSPHERIC PRESS: ABNORMAL MM[HG]
BASE EXCESS BLDA CALC-SCNC: -2.2 MMOL/L (ref 0–3)
BASE EXCESS BLDA CALC-SCNC: 3.9 MMOL/L (ref 0–3)
BASE EXCESS BLDA CALC-SCNC: 5.1 MMOL/L (ref 0–3)
BASOPHILS # BLD AUTO: 0.02 10*3/MM3 (ref 0–0.2)
BASOPHILS NFR BLD AUTO: 0.1 % (ref 0–1.5)
BDY SITE: ABNORMAL
BILIRUB SERPL-MCNC: <0.2 MG/DL (ref 0–1.2)
BUN SERPL-MCNC: 18 MG/DL (ref 8–23)
BUN SERPL-MCNC: 21 MG/DL (ref 8–23)
BUN/CREAT SERPL: 32.8 (ref 7–25)
BUN/CREAT SERPL: 36 (ref 7–25)
CA-I BLDA-SCNC: 1.34 MMOL/L (ref 1.15–1.33)
CALCIUM SPEC-SCNC: 8.6 MG/DL (ref 8.6–10.5)
CALCIUM SPEC-SCNC: 8.7 MG/DL (ref 8.6–10.5)
CHLORIDE SERPL-SCNC: 103 MMOL/L (ref 98–107)
CHLORIDE SERPL-SCNC: 106 MMOL/L (ref 98–107)
CO2 BLDA-SCNC: 28.7 MMOL/L (ref 22–29)
CO2 BLDA-SCNC: 33.8 MMOL/L (ref 22–29)
CO2 SERPL-SCNC: 24 MMOL/L (ref 22–29)
CO2 SERPL-SCNC: 28.6 MMOL/L (ref 22–29)
CREAT BLDA-MCNC: 0.82 MG/DL (ref 0.6–1.3)
CREAT SERPL-MCNC: 0.5 MG/DL (ref 0.76–1.27)
CREAT SERPL-MCNC: 0.64 MG/DL (ref 0.76–1.27)
D-LACTATE SERPL-SCNC: 0.9 MMOL/L (ref 0.5–2)
D-LACTATE SERPL-SCNC: 1.3 MMOL/L (ref 0.5–2)
D-LACTATE SERPL-SCNC: 1.9 MMOL/L (ref 0.5–2)
D-LACTATE SERPL-SCNC: 4.5 MMOL/L (ref 0.2–2)
DEPRECATED RDW RBC AUTO: 47 FL (ref 37–54)
EGFRCR SERPLBLD CKD-EPI 2021: 107.7 ML/MIN/1.73
EGFRCR SERPLBLD CKD-EPI 2021: 116 ML/MIN/1.73
EGFRCR SERPLBLD CKD-EPI 2021: 99.9 ML/MIN/1.73
EOSINOPHIL # BLD AUTO: 0 10*3/MM3 (ref 0–0.4)
EOSINOPHIL NFR BLD AUTO: 0 % (ref 0.3–6.2)
ERYTHROCYTE [DISTWIDTH] IN BLOOD BY AUTOMATED COUNT: 14.6 % (ref 12.3–15.4)
GEN 5 1HR TROPONIN T REFLEX: 1957 NG/L
GLOBULIN UR ELPH-MCNC: 2.4 GM/DL
GLUCOSE BLDC GLUCOMTR-MCNC: 113 MG/DL (ref 70–105)
GLUCOSE BLDC GLUCOMTR-MCNC: 126 MG/DL (ref 70–105)
GLUCOSE BLDC GLUCOMTR-MCNC: 151 MG/DL (ref 74–100)
GLUCOSE BLDC GLUCOMTR-MCNC: 173 MG/DL (ref 74–100)
GLUCOSE BLDC GLUCOMTR-MCNC: 212 MG/DL (ref 70–105)
GLUCOSE BLDC GLUCOMTR-MCNC: 246 MG/DL (ref 74–100)
GLUCOSE BLDC GLUCOMTR-MCNC: 246 MG/DL (ref 74–100)
GLUCOSE BLDC GLUCOMTR-MCNC: 99 MG/DL (ref 70–105)
GLUCOSE SERPL-MCNC: 115 MG/DL (ref 65–99)
GLUCOSE SERPL-MCNC: 153 MG/DL (ref 65–99)
HCO3 BLDA-SCNC: 27.6 MMOL/L (ref 21–28)
HCO3 BLDA-SCNC: 27.9 MMOL/L (ref 21–28)
HCO3 BLDA-SCNC: 32.1 MMOL/L (ref 21–28)
HCT VFR BLD AUTO: 32.5 % (ref 37.5–51)
HCT VFR BLDA CALC: 39 % (ref 38–51)
HEMODILUTION: NO
HGB BLD-MCNC: 10.5 G/DL (ref 13–17.7)
HGB BLDA-MCNC: 13.3 G/DL (ref 12–17)
IMM GRANULOCYTES # BLD AUTO: 0.09 10*3/MM3 (ref 0–0.05)
IMM GRANULOCYTES NFR BLD AUTO: 0.5 % (ref 0–0.5)
INHALED O2 CONCENTRATION: 35 %
INHALED O2 CONCENTRATION: 40 %
INHALED O2 CONCENTRATION: 60 %
LYMPHOCYTES # BLD AUTO: 0.52 10*3/MM3 (ref 0.7–3.1)
LYMPHOCYTES NFR BLD AUTO: 2.7 % (ref 19.6–45.3)
Lab: ABNORMAL
Lab: ABNORMAL
MAGNESIUM SERPL-MCNC: 2.3 MG/DL (ref 1.6–2.4)
MAGNESIUM SERPL-MCNC: 2.9 MG/DL (ref 1.6–2.4)
MCH RBC QN AUTO: 28.8 PG (ref 26.6–33)
MCHC RBC AUTO-ENTMCNC: 32.3 G/DL (ref 31.5–35.7)
MCV RBC AUTO: 89.3 FL (ref 79–97)
MODALITY: ABNORMAL
MONOCYTES # BLD AUTO: 1.47 10*3/MM3 (ref 0.1–0.9)
MONOCYTES NFR BLD AUTO: 7.7 % (ref 5–12)
NEUTROPHILS NFR BLD AUTO: 16.89 10*3/MM3 (ref 1.7–7)
NEUTROPHILS NFR BLD AUTO: 89 % (ref 42.7–76)
NOTIFIED WHO: ABNORMAL
NOTIFIED WHO: ABNORMAL
NRBC BLD AUTO-RTO: 0 /100 WBC (ref 0–0.2)
PCO2 BLDA: 37.2 MM HG (ref 35–48)
PCO2 BLDA: 56.8 MM HG (ref 35–48)
PCO2 BLDA: 74.7 MM HG (ref 35–48)
PEEP RESPIRATORY: 5 CM[H2O]
PH BLDA: 7.18 PH UNITS (ref 7.35–7.45)
PH BLDA: 7.36 PH UNITS (ref 7.35–7.45)
PH BLDA: 7.48 PH UNITS (ref 7.35–7.45)
PHOSPHATE SERPL-MCNC: 1.4 MG/DL (ref 2.5–4.5)
PLATELET # BLD AUTO: 283 10*3/MM3 (ref 140–450)
PMV BLD AUTO: 9 FL (ref 6–12)
PO2 BLD: 300 MM[HG] (ref 0–500)
PO2 BLD: 305 MM[HG] (ref 0–500)
PO2 BLD: 335 MM[HG] (ref 0–500)
PO2 BLDA: 106.8 MM HG (ref 83–108)
PO2 BLDA: 120 MM HG (ref 83–108)
PO2 BLDA: 201.2 MM HG (ref 83–108)
POTASSIUM BLDA-SCNC: 4.2 MMOL/L (ref 3.5–4.5)
POTASSIUM SERPL-SCNC: 3.8 MMOL/L (ref 3.5–5.2)
POTASSIUM SERPL-SCNC: 4.7 MMOL/L (ref 3.5–5.2)
PROT SERPL-MCNC: 5.5 G/DL (ref 6–8.5)
RBC # BLD AUTO: 3.64 10*6/MM3 (ref 4.14–5.8)
READ BACK: ABNORMAL
READ BACK: ABNORMAL
RESPIRATORY RATE: 22
RESPIRATORY RATE: 28
SAO2 % BLDCOA: 97.7 % (ref 94–98)
SAO2 % BLDCOA: 99 % (ref 94–98)
SAO2 % BLDCOA: 99.4 % (ref 94–98)
SODIUM BLD-SCNC: 142 MMOL/L (ref 138–146)
SODIUM SERPL-SCNC: 138 MMOL/L (ref 136–145)
SODIUM SERPL-SCNC: 139 MMOL/L (ref 136–145)
TROPONIN T % DELTA: 6
TROPONIN T NUMERIC DELTA: 112 NG/L
TROPONIN T SERPL HS-MCNC: 1845 NG/L
VENTILATOR MODE: AC
VT ON VENT VENT: 500 ML
VT ON VENT VENT: 500 ML
WBC NRBC COR # BLD AUTO: 18.99 10*3/MM3 (ref 3.4–10.8)

## 2025-02-05 PROCEDURE — 84100 ASSAY OF PHOSPHORUS: CPT | Performed by: NURSE PRACTITIONER

## 2025-02-05 PROCEDURE — 94761 N-INVAS EAR/PLS OXIMETRY MLT: CPT

## 2025-02-05 PROCEDURE — 71045 X-RAY EXAM CHEST 1 VIEW: CPT

## 2025-02-05 PROCEDURE — 94664 DEMO&/EVAL PT USE INHALER: CPT

## 2025-02-05 PROCEDURE — 93010 ELECTROCARDIOGRAM REPORT: CPT | Performed by: INTERNAL MEDICINE

## 2025-02-05 PROCEDURE — 25810000003 SODIUM CHLORIDE 0.9 % SOLUTION

## 2025-02-05 PROCEDURE — 93005 ELECTROCARDIOGRAM TRACING: CPT | Performed by: NURSE PRACTITIONER

## 2025-02-05 PROCEDURE — 25010000002 AMIODARONE IN DEXTROSE 5% 360-4.14 MG/200ML-% SOLUTION: Performed by: NURSE PRACTITIONER

## 2025-02-05 PROCEDURE — 83735 ASSAY OF MAGNESIUM: CPT | Performed by: INTERNAL MEDICINE

## 2025-02-05 PROCEDURE — 25010000002 PROPOFOL 10 MG/ML EMULSION

## 2025-02-05 PROCEDURE — 85730 THROMBOPLASTIN TIME PARTIAL: CPT | Performed by: INTERNAL MEDICINE

## 2025-02-05 PROCEDURE — 82948 REAGENT STRIP/BLOOD GLUCOSE: CPT | Performed by: INTERNAL MEDICINE

## 2025-02-05 PROCEDURE — 80053 COMPREHEN METABOLIC PANEL: CPT | Performed by: NURSE PRACTITIONER

## 2025-02-05 PROCEDURE — 83735 ASSAY OF MAGNESIUM: CPT | Performed by: NURSE PRACTITIONER

## 2025-02-05 PROCEDURE — 82803 BLOOD GASES ANY COMBINATION: CPT

## 2025-02-05 PROCEDURE — 94660 CPAP INITIATION&MGMT: CPT

## 2025-02-05 PROCEDURE — 25010000002 AMIODARONE IN DEXTROSE 5% 150-4.21 MG/100ML-% SOLUTION: Performed by: NURSE PRACTITIONER

## 2025-02-05 PROCEDURE — 82948 REAGENT STRIP/BLOOD GLUCOSE: CPT

## 2025-02-05 PROCEDURE — 25010000002 THIAMINE PER 100 MG: Performed by: EMERGENCY MEDICINE

## 2025-02-05 PROCEDURE — 94799 UNLISTED PULMONARY SVC/PX: CPT

## 2025-02-05 PROCEDURE — 80051 ELECTROLYTE PANEL: CPT

## 2025-02-05 PROCEDURE — 63710000001 INSULIN LISPRO (HUMAN) PER 5 UNITS: Performed by: INTERNAL MEDICINE

## 2025-02-05 PROCEDURE — 82803 BLOOD GASES ANY COMBINATION: CPT | Performed by: NURSE PRACTITIONER

## 2025-02-05 PROCEDURE — 25010000002 MAGNESIUM SULFATE 2 GM/50ML SOLUTION: Performed by: NURSE PRACTITIONER

## 2025-02-05 PROCEDURE — 25010000002 METHYLPREDNISOLONE PER 40 MG: Performed by: EMERGENCY MEDICINE

## 2025-02-05 PROCEDURE — 25010000002 FUROSEMIDE PER 20 MG: Performed by: NURSE PRACTITIONER

## 2025-02-05 PROCEDURE — 36600 WITHDRAWAL OF ARTERIAL BLOOD: CPT | Performed by: NURSE PRACTITIONER

## 2025-02-05 PROCEDURE — 36600 WITHDRAWAL OF ARTERIAL BLOOD: CPT

## 2025-02-05 PROCEDURE — 85025 COMPLETE CBC W/AUTO DIFF WBC: CPT | Performed by: INTERNAL MEDICINE

## 2025-02-05 PROCEDURE — 85018 HEMOGLOBIN: CPT

## 2025-02-05 PROCEDURE — 25010000002 ENOXAPARIN PER 10 MG: Performed by: EMERGENCY MEDICINE

## 2025-02-05 PROCEDURE — 63710000001 INSULIN GLARGINE PER 5 UNITS: Performed by: NURSE PRACTITIONER

## 2025-02-05 PROCEDURE — 93005 ELECTROCARDIOGRAM TRACING: CPT | Performed by: INTERNAL MEDICINE

## 2025-02-05 PROCEDURE — 84484 ASSAY OF TROPONIN QUANT: CPT | Performed by: NURSE PRACTITIONER

## 2025-02-05 PROCEDURE — 83605 ASSAY OF LACTIC ACID: CPT

## 2025-02-05 PROCEDURE — 99232 SBSQ HOSP IP/OBS MODERATE 35: CPT | Performed by: INTERNAL MEDICINE

## 2025-02-05 PROCEDURE — 25010000002 FUROSEMIDE PER 20 MG: Performed by: EMERGENCY MEDICINE

## 2025-02-05 PROCEDURE — 93005 ELECTROCARDIOGRAM TRACING: CPT | Performed by: EMERGENCY MEDICINE

## 2025-02-05 PROCEDURE — 82565 ASSAY OF CREATININE: CPT

## 2025-02-05 PROCEDURE — 83605 ASSAY OF LACTIC ACID: CPT | Performed by: EMERGENCY MEDICINE

## 2025-02-05 PROCEDURE — 82330 ASSAY OF CALCIUM: CPT

## 2025-02-05 PROCEDURE — 94003 VENT MGMT INPAT SUBQ DAY: CPT

## 2025-02-05 PROCEDURE — 25010000002 CEFTRIAXONE PER 250 MG

## 2025-02-05 RX ORDER — MAGNESIUM SULFATE HEPTAHYDRATE 40 MG/ML
2 INJECTION, SOLUTION INTRAVENOUS ONCE
Status: COMPLETED | OUTPATIENT
Start: 2025-02-05 | End: 2025-02-05

## 2025-02-05 RX ORDER — FENTANYL CITRATE 50 UG/ML
INJECTION, SOLUTION INTRAMUSCULAR; INTRAVENOUS
Status: DISCONTINUED
Start: 2025-02-05 | End: 2025-02-05 | Stop reason: WASHOUT

## 2025-02-05 RX ORDER — MIDAZOLAM HYDROCHLORIDE 1 MG/ML
INJECTION, SOLUTION INTRAMUSCULAR; INTRAVENOUS
Status: DISCONTINUED
Start: 2025-02-05 | End: 2025-02-05 | Stop reason: WASHOUT

## 2025-02-05 RX ORDER — FENTANYL/ROPIVACAINE/NS/PF 2-625MCG/1
15 PLASTIC BAG, INJECTION (ML) EPIDURAL ONCE
Status: COMPLETED | OUTPATIENT
Start: 2025-02-05 | End: 2025-02-05

## 2025-02-05 RX ORDER — METOPROLOL TARTRATE 50 MG
50 TABLET ORAL ONCE
Status: COMPLETED | OUTPATIENT
Start: 2025-02-06 | End: 2025-02-05

## 2025-02-05 RX ORDER — ETOMIDATE 2 MG/ML
INJECTION INTRAVENOUS
Status: DISCONTINUED
Start: 2025-02-05 | End: 2025-02-05 | Stop reason: WASHOUT

## 2025-02-05 RX ORDER — FUROSEMIDE 10 MG/ML
40 INJECTION INTRAMUSCULAR; INTRAVENOUS ONCE
Status: COMPLETED | OUTPATIENT
Start: 2025-02-05 | End: 2025-02-05

## 2025-02-05 RX ORDER — METOPROLOL TARTRATE 1 MG/ML
2.5 INJECTION, SOLUTION INTRAVENOUS ONCE
Status: COMPLETED | OUTPATIENT
Start: 2025-02-05 | End: 2025-02-05

## 2025-02-05 RX ADMIN — DEXMEDETOMIDINE HYDROCHLORIDE IN SODIUM CHLORIDE 1.5 MCG/KG/HR: 4 INJECTION INTRAVENOUS at 11:12

## 2025-02-05 RX ADMIN — INSULIN LISPRO 4 UNITS: 100 INJECTION, SOLUTION INTRAVENOUS; SUBCUTANEOUS at 00:01

## 2025-02-05 RX ADMIN — BUDESONIDE 0.5 MG: 0.5 INHALANT RESPIRATORY (INHALATION) at 06:44

## 2025-02-05 RX ADMIN — ACETAMINOPHEN 650 MG: 325 TABLET, FILM COATED ORAL at 21:03

## 2025-02-05 RX ADMIN — PROPOFOL 35 MCG/KG/MIN: 10 INJECTION, EMULSION INTRAVENOUS at 08:24

## 2025-02-05 RX ADMIN — ATORVASTATIN CALCIUM 40 MG: 40 TABLET, FILM COATED ORAL at 20:13

## 2025-02-05 RX ADMIN — TICAGRELOR 90 MG: 90 TABLET ORAL at 08:42

## 2025-02-05 RX ADMIN — CHLORHEXIDINE GLUCONATE 15 ML: 1.2 RINSE ORAL at 20:13

## 2025-02-05 RX ADMIN — SODIUM CHLORIDE 15 MMOL: 9 INJECTION, SOLUTION INTRAVENOUS at 06:19

## 2025-02-05 RX ADMIN — MUPIROCIN 1 APPLICATION: 20 OINTMENT TOPICAL at 20:13

## 2025-02-05 RX ADMIN — ESCITALOPRAM 20 MG: 10 TABLET, FILM COATED ORAL at 08:42

## 2025-02-05 RX ADMIN — ASPIRIN 81 MG CHEWABLE TABLET 81 MG: 81 TABLET CHEWABLE at 08:42

## 2025-02-05 RX ADMIN — TICAGRELOR 90 MG: 90 TABLET ORAL at 20:13

## 2025-02-05 RX ADMIN — EMPAGLIFLOZIN 10 MG: 10 TABLET, FILM COATED ORAL at 16:52

## 2025-02-05 RX ADMIN — LANSOPRAZOLE 30 MG: 30 TABLET, ORALLY DISINTEGRATING ORAL at 06:20

## 2025-02-05 RX ADMIN — METHYLPREDNISOLONE SODIUM SUCCINATE 40 MG: 40 INJECTION, POWDER, FOR SOLUTION INTRAMUSCULAR; INTRAVENOUS at 08:42

## 2025-02-05 RX ADMIN — FUROSEMIDE 40 MG: 10 INJECTION, SOLUTION INTRAMUSCULAR; INTRAVENOUS at 18:47

## 2025-02-05 RX ADMIN — PROPOFOL 50 MCG/KG/MIN: 10 INJECTION, EMULSION INTRAVENOUS at 04:11

## 2025-02-05 RX ADMIN — METOPROLOL TARTRATE 2.5 MG: 5 INJECTION INTRAVENOUS at 18:20

## 2025-02-05 RX ADMIN — INSULIN GLARGINE 10 UNITS: 100 INJECTION, SOLUTION SUBCUTANEOUS at 20:13

## 2025-02-05 RX ADMIN — AMIODARONE HYDROCHLORIDE 1 MG/MIN: 1.8 INJECTION, SOLUTION INTRAVENOUS at 19:11

## 2025-02-05 RX ADMIN — POLYETHYLENE GLYCOL 3350 17 G: 17 POWDER, FOR SOLUTION ORAL at 08:42

## 2025-02-05 RX ADMIN — THIAMINE HYDROCHLORIDE 200 MG: 100 INJECTION, SOLUTION INTRAMUSCULAR; INTRAVENOUS at 06:22

## 2025-02-05 RX ADMIN — AMIODARONE HYDROCHLORIDE 150 MG: 1.5 INJECTION, SOLUTION INTRAVENOUS at 18:59

## 2025-02-05 RX ADMIN — CHLORHEXIDINE GLUCONATE 15 ML: 1.2 RINSE ORAL at 08:42

## 2025-02-05 RX ADMIN — Medication 10 ML: at 20:17

## 2025-02-05 RX ADMIN — METOPROLOL TARTRATE 2.5 MG: 5 INJECTION INTRAVENOUS at 18:10

## 2025-02-05 RX ADMIN — METOPROLOL TARTRATE 50 MG: 50 TABLET, FILM COATED ORAL at 23:54

## 2025-02-05 RX ADMIN — MUPIROCIN 1 APPLICATION: 20 OINTMENT TOPICAL at 08:42

## 2025-02-05 RX ADMIN — ENOXAPARIN SODIUM 40 MG: 100 INJECTION SUBCUTANEOUS at 16:52

## 2025-02-05 RX ADMIN — MAGNESIUM SULFATE IN WATER FOR 2 G: 40 INJECTION INTRAVENOUS at 19:33

## 2025-02-05 RX ADMIN — CEFTRIAXONE 2000 MG: 2 INJECTION, POWDER, FOR SOLUTION INTRAMUSCULAR; INTRAVENOUS at 23:59

## 2025-02-05 RX ADMIN — FUROSEMIDE 40 MG: 10 INJECTION, SOLUTION INTRAMUSCULAR; INTRAVENOUS at 11:23

## 2025-02-05 RX ADMIN — BUDESONIDE 0.5 MG: 0.5 INHALANT RESPIRATORY (INHALATION) at 19:30

## 2025-02-05 RX ADMIN — Medication 10 ML: at 12:36

## 2025-02-05 NOTE — PROGRESS NOTES
Critical Care Progress Note     Chu Peralta : 1965 MRN:[id number] LOS:2  Rm: 3124/1     Principal Problem: Acute on chronic respiratory failure     Reason for follow up: All the medical problems listed below    Summary     Chu Peralta is a 59 y.o. male with PMH of COPD, Chronic hypoxic respiratory failure on home 2 L, tobacco use disorder, RLS who presented to the hospital for respiratory distress and was admitted on 2/3/2025  4:39 PM with a principal diagnosis of Acute on chronic respiratory failure.      Patient was initially brought into an outside hospital and was in respiratory distress.  Placed on BiPAP.  Initially was DNI however rescinded the DNR and was subsequently intubated.  Patient was being treated with antibiotics for multifocal pneumonia as well as influenza A.  Patient was given broad-spectrum antibiotics and transferred to [facility] for further care.    Upon arrival to [facility] the patient was noted to have concerning EKG for acute coronary syndrome and went emergently to the Cath Lab with cardiology.  He had an RCA occlusion with 1 stent placed.  Postoperative EF was around 15%.  Patient was admitted to the ICU for further care.      Chu Peralta is now Hospital Day: 3    Significant Events / Subjective     24 hour events 25 : increasing lactic yesterday, started on dobutamine in the afternoon, lactic cleared, HR elevated,  dobutamine stopped, on improving doses of norephinephrine. Will attempt to wean sedation and extubate today if possible.  Give 40 of lasix to improved diuresis    Assessment / Plan   PLAN by systems:    Neuro  #Analgesia/Sedation  #encephalopathy  -RAAS Goal 0-1  -Fent/prop-->Precedex this morning  -Awake and following commands on sedation, no deficits      Cardiovascular  Pulse  Av.1  Min: 88  Max: 128  Resp  Av.1  Min: 28  Max: 29  SpO2  Av.3 %  Min: 96 %  Max: 99 %  Systolic (24hrs), Av , Min:93 , Max:130      Diastolic (24hrs), Av, Min:56, Max:82    #STEMI (RCA)  #HFrEF (15-20%)  #Shock  -s/p cath with RCA stent placement on 2/3   -ASA/Brillinta  -Statin  -BB contraindicated in s/o shock  -Shock likely mixed distributive and cardiac  -On improved doses of levo  -Was briefly on dobutamine yesterday  -Trend CVP  -Lasix 40 this AM  -Follow up repeat lactic  -Starting patient on Jardiance   -Continue to monitor      Pulmonary  ABG:  pH, Arterial   Date Value Ref Range Status   2025 7.478 (H) 7.350 - 7.450 pH units Final   2025 7.358 7.350 - 7.450 pH units Final   2025 7.295 (L) 7.350 - 7.450 pH units Final     pO2, Arterial   Date Value Ref Range Status   2025 120.0 (H) 83.0 - 108.0 mm Hg Final   2025 180.0 (H) 83.0 - 108.0 mm Hg Final   2025 97.0 83.0 - 108.0 mm Hg Final     pCO2, Arterial   Date Value Ref Range Status   2025 37.2 35.0 - 48.0 mm Hg Final   2025 42.9 35.0 - 48.0 mm Hg Final   2025 49.1 (H) 35.0 - 48.0 mm Hg Final     HCO3, Arterial   Date Value Ref Range Status   2025 27.6 21.0 - 28.0 mmol/L Final   2025 24.1 21.0 - 28.0 mmol/L Final   2025 23.9 21.0 - 28.0 mmol/L Final     Mode: VC/AC  FiO2 (%):  [30 %-40 %] 30 %  S RR:  [28] 28  S VT:  [500 mL] 500 mL  PEEP/CPAP (cm H2O):  [5 cm H20] 5 cm H20  MAP (cm H2O):  [12-14] 13  #COPD  #Hypoxic, hypercapenic respiratory failure  -On methylpred  -resolved acidosis  -Wean off mechanical ventilation, on SBT now  -Nebs  -Maintain O2 sats >92%  -CXR showing: No significant consolidations, PTX, or effusions      GI/Nutrition  #No acute issues  NPO Diet NPO Type: Strict NPO  Tube Feeding: Formula: Peptamen Intense VHP (Vital HP); Feeding Type: Continuous; Start at: 20 mL/hr; Then Advance By: Do Not Advance; Water Flush: 20 mL; Every: 1 hour; Water Bolus: None   Bowel regimen: docusate/miralax prn  Stress ulcer ppx:lansoprazole  -if extubated with attempt to do swallow study    Renal/       Lab 25  0415 25  0518 25  2320 25  2035 25  1716 25  1710   SODIUM 138 139  --   --  135*  --    POTASSIUM 3.8 4.7 5.7*  --  5.1  --    CHLORIDE 106 105  --   --  101  --    CO2 24.0 21.1*  --   --  25.4  --    BUN 18 20  --   --  11  --    CREATININE 0.50* 1.02  --  1.19 0.91 0.89   GLUCOSE 153* 209*  --   --  186*  --    CALCIUM 8.7 8.7  --   --  8.7  --    PHOSPHORUS 1.4* 3.7  --   --  5.1*  --      #No acute issues  -Monitor electrolytes and UOP  -Monitor nephrotoxic agents administered  -Mims- yes for strict I/o  -40 lasix as he was very positive yesterday      Intake/Output Summary (Last 24 hours) at 2025 1221  Last data filed at 2025 1200  Gross per 24 hour   Intake 4871 ml   Output 1345 ml   Net 3526 ml       Heme/Onc  Hemoglobin   Date Value Ref Range Status   2025 10.5 (L) 13.0 - 17.7 g/dL Final   2025 12.0 (L) 13.0 - 17.7 g/dL Final   2025 13.2 12.0 - 17.0 g/dL Final   2025 12.6 (L) 13.0 - 17.7 g/dL Final   2025 13.9 12.0 - 17.0 g/dL Final     Platelets   Date Value Ref Range Status   2025 283 140 - 450 10*3/mm3 Final   2025 330 140 - 450 10*3/mm3 Final   2025 336 140 - 450 10*3/mm3 Final     #No acute issues  -Usual transfusion parameters (HgB <7, Plt <10 unless procedures or bleeding)  -DVT PPX: VTE Prophylaxis:lovenox  Pharmacologic VTE prophylaxis orders are present.         Endocrine  Glucose   Date Value Ref Range Status   2025 153 (H) 65 - 99 mg/dL Final   2025 209 (H) 65 - 99 mg/dL Final   2025 186 (H) 65 - 99 mg/dL Final     #hyperglycemia  -Elevated A1c, no history of DM  -Steroids exacerbating symptoms  -Goal  140-180  -Accuchecks and SSI    ID  WBC   Date Value Ref Range Status   2025 18.99 (H) 3.40 - 10.80 10*3/mm3 Final   2025 18.70 (H) 3.40 - 10.80 10*3/mm3 Final   2025 25.64 (H) 3.40 - 10.80 10*3/mm3 Final     Temp (24hrs), Av.5 °F (36.9 °C), Min:97.2 °F  (36.2 °C), Max:99 °F (37.2 °C)    #Shock  #Possible pneumonia  #influenza A  -Leukocytosis to 25 on presentation  -CT chest c/f multifocal pneumonia at OSH  -negative strep/legionella antigen   -No evidence of infection in urine  -RVP positive for influenza A.  Per wife symptoms has been going on >48 hours. No indications for tamiflu at this time  -Monitor for signs of infection  -cefTRIAXone (ROCEPHIN) 2000 mg IVPB in 100 mL NS (MBP)    MSK/Skin  #No acute issues  -PT/OT  -Media tab for wound pictures      Disposition:  remain in ICU    Code status:   Level Of Support Discussed With: Patient  Code Status (Patient has no pulse and is not breathing): CPR (Attempt to Resuscitate)  Medical Interventions (Patient has pulse or is breathing): Full Support           Objective / Physical Exam     Vital signs:  Temp: 98.1 °F (36.7 °C)  BP: 106/66  Heart Rate: 89  Resp: 28  SpO2: 97 %  Weight: 78.1 kg (172 lb 2.9 oz)    Admission Weight: Weight: 76 kg (167 lb 8.8 oz)  Current Weight: Weight: 78.1 kg (172 lb 2.9 oz)    Input/Output in last 24 hours:    Intake/Output Summary (Last 24 hours) at 2/5/2025 1221  Last data filed at 2/5/2025 1200  Gross per 24 hour   Intake 4871 ml   Output 1345 ml   Net 3526 ml      Net IO Since Admission: 3,921 mL [02/05/25 1221]     GENERAL APPEARANCE:  Laying in bed intubated in NAD  HEENT:  Normal conjunctivae, normal eyelids  NECK:  trachea midline  HEART: regular rate and rhythm  LUNGS:  CTAB, no wheezing  ABDOMEN:  Soft, nontender, nondistended   :badillo in place, normal penis  EXTREMITIES:  trace pedal edema  NEUROLOGICAL: sedated but will wean and assess neuro status today  Skin:  warm with good pulses    Radiology and Labs     Results from last 7 days   Lab Units 02/05/25  0415 02/04/25  0518 02/03/25  2035 02/03/25  1716 02/03/25  1710   WBC 10*3/mm3 18.99* 18.70*  --  25.64*  --    HEMOGLOBIN g/dL 10.5* 12.0*  --  12.6*  --    HEMOGLOBIN, POC g/dL  --   --  13.2  --  13.9   HEMATOCRIT %  32.5* 37.6  --  40.6  --    HEMATOCRIT POC %  --   --  39  --  41   PLATELETS 10*3/mm3 283 330  --  336  --       Results from last 7 days   Lab Units 02/05/25 0415 02/04/25 0518 02/03/25 1716   PROTIME Seconds  --   --  15.3*   INR   --   --  1.21*   APTT seconds 37.2* 32.9 >200.0*      Results from last 7 days   Lab Units 02/05/25 0415 02/04/25 0518 02/03/25 2320 02/03/25 2035 02/03/25 1716 02/03/25 1710   SODIUM mmol/L 138 139  --   --  135*  --    POTASSIUM mmol/L 3.8 4.7 5.7*  --  5.1  --    CHLORIDE mmol/L 106 105  --   --  101  --    CO2 mmol/L 24.0 21.1*  --   --  25.4  --    BUN mg/dL 18 20  --   --  11  --    CREATININE mg/dL 0.50* 1.02  --  1.19 0.91 0.89   GLUCOSE mg/dL 153* 209*  --   --  186*  --    MAGNESIUM mg/dL 2.3 2.5*  --   --  2.3  --    PHOSPHORUS mg/dL 1.4* 3.7  --   --  5.1*  --       Results from last 7 days   Lab Units 02/05/25 0415 02/04/25 0518 02/03/25 1716   ALK PHOS U/L 48 58 66   AST (SGOT) U/L 169* 183* 84*   ALT (SGPT) U/L 56* 49* 30     Results from last 7 days   Lab Units 02/05/25  0336 02/04/25  0733 02/04/25  0250 02/03/25 2355 02/03/25  2248   PH, ARTERIAL pH units 7.478* 7.358 7.295* 7.160* 7.123*   PCO2, ARTERIAL mm Hg 37.2 42.9 49.1* 65.0* 78.0*   PO2 ART mm Hg 120.0* 180.0* 97.0 143.1* 74.5*   O2 SATURATION ART % 99.0* 99.5* 96.6 98.3* 88.0*   FIO2 % 40 50 60 60 50   HCO3 ART mmol/L 27.6 24.1 23.9 23.2 25.5   BASE EXCESS ART mmol/L 3.9* -1.4* -3.1* -6.5* -5.7*       XR Chest 1 View    Result Date: 2/5/2025  Impression: 1. Lines and support tubes in expected location. 2. No pneumothorax. 3. Clear lungs. Electronically Signed: Celso Cox MD  2/5/2025 7:16 AM EST  Workstation ID: [id number]    XR Chest 1 View    Result Date: 2/4/2025  Impression: 1.New right internal jugular central venous catheter terminates in the mid SVC. 2.Gastric suction tube terminates in the stomach with the sideport just below the gastroesophageal junction. 3.Stable endotracheal tube. 4.No  acute cardiopulmonary abnormality. Electronically Signed: Alvaro Poe MD  2/4/2025 5:11 AM EST  Workstation ID: [id number]    XR Abdomen KUB    Result Date: 2/4/2025  Impression: Gastric suction tube terminates in the proximal stomach. Electronically Signed: Alvaro Poe MD  2/4/2025 1:52 AM EST  Workstation ID: [id number]    XR Chest 1 View    Result Date: 2/3/2025  Impression: 1.The tip of the endotracheal tube is in good position terminating at the level of the aortic knob. 2.Emphysema. Electronically Signed: Perez Veronica MD  2/3/2025 5:50 PM EST  Workstation ID: [id number]     Current medications     Scheduled Meds:   aspirin, 81 mg, Nasogastric, Daily  atorvastatin, 40 mg, Nasogastric, Nightly  budesonide, 0.5 mg, Nebulization, BID - RT  cefTRIAXone, 2,000 mg, Intravenous, Q24H  chlorhexidine, 15 mL, Mouth/Throat, Q12H  empagliflozin, 10 mg, Oral, Daily  enoxaparin, 40 mg, Subcutaneous, Q24H  escitalopram, 20 mg, Nasogastric, Daily  insulin glargine, 10 Units, Subcutaneous, Nightly  insulin lispro, 4-24 Units, Subcutaneous, Q6H  lansoprazole, 30 mg, Nasogastric, Q AM  [Held by provider] lisinopril, 10 mg, Oral, Daily  methylPREDNISolone sodium succinate, 40 mg, Intravenous, Daily  [Held by provider] metoprolol succinate XL, 25 mg, Oral, Daily  mupirocin, 1 Application, Each Nare, BID  polyethylene glycol, 17 g, Oral, Daily  sodium chloride, 10 mL, Intravenous, Q12H  ticagrelor, 90 mg, Nasogastric, BID        Continuous Infusions:   dexmedetomidine, 0.2-1.5 mcg/kg/hr, Last Rate: 0.8 mcg/kg/hr (02/05/25 1217)  fentanyl 10 mcg/mL,  mcg/hr, Last Rate: Stopped (02/05/25 1217)  norepinephrine, 0.02-0.5 mcg/kg/min, Last Rate: 0.05 mcg/kg/min (02/05/25 1218)          Plan discussed with RN. Reviewed all other data in the last 24 hours, including but not limited to vitals, labs, microbiology, imaging and pertinent notes from other providers.  Plan also discussed with patient's wife at the bedside.    Total critical  care time: Approximately 32 minutes    Due to a high probability of clinically significant, life threatening deterioration, the patient required my highest level of preparedness to intervene emergently and I personally spent this critical care time directly and personally managing the patient. This critical care time included obtaining a history; examining the patient; pulse oximetry; ordering and review of studies; arranging urgent treatment with development of a management plan; evaluation of patient's response to treatment; frequent reassessment; and, discussions with other providers.    This critical care time was performed to assess and manage the high probability of imminent, life-threatening deterioration that could result in multi-organ failure. It was exclusive of separately billable procedures and treating other patients and teaching time.    Dante Truong MD   Critical Care  02/05/25   12:21 EST

## 2025-02-05 NOTE — THERAPY TREATMENT NOTE
Family stated that the pt only does the treatments PRN at home due to the treatments causing agitation, high heart rate and would vagal down. Patients heart rate would stay in or around the 120s and his pressures drop with each breathing treatment. The family member in the room let me know the pt only does the treatments PRN at home and requested if we could do the same here. Patients family said she is happy to explain this to the doctor as well.

## 2025-02-05 NOTE — PROGRESS NOTES
Nutrition Services  Patient Name: Chu Peralta  YOB: 1965  MRN: [id number]  Admission date: 2/3/2025    PROGRESS NOTE      Nutrition Intervention Updates: No change in EN prescription due to electrolyte imbalance and to monitor for ability to advance further towards goal.         Encounter Information: Check on for TF.  Patient discussed in AM rounds.  On fentanyl, levo, propofol (18.19 mL/hour providing 480 kcal per day).  MD to add lasix.  MD hopeful to extubate today.         PO Diet: NPO Diet NPO Type: Strict NPO   PO Supplements: None ordered   PO Intake:  NPO       Current nutrition support: Peptamen Intense VHP at 20 mL/hour + 20 mL/hour water flush    Nutrition support review: Running as above per RN during rounds        Labs (reviewed below): Hyperphosphatemia - replaced today        GI Function:  No BM since admission (x 2 days ago)       Brief weight review   Wt Readings from Last 10 Encounters:   02/05/25 0541 78.1 kg (172 lb 2.9 oz)   02/04/25 0600 75.5 kg (166 lb 7.2 oz)   02/03/25 1831 75.8 kg (167 lb)   02/03/25 1700 76 kg (167 lb 8.8 oz)   02/03/25 1651 76 kg (167 lb 8.8 oz)        Results from last 7 days   Lab Units 02/05/25  0415 02/04/25  0518 02/03/25  2320 02/03/25  2035 02/03/25  1716   SODIUM mmol/L 138 139  --   --  135*   POTASSIUM mmol/L 3.8 4.7 5.7*  --  5.1   CHLORIDE mmol/L 106 105  --   --  101   CO2 mmol/L 24.0 21.1*  --   --  25.4   BUN mg/dL 18 20  --   --  11   CREATININE mg/dL 0.50* 1.02  --  1.19 0.91   CALCIUM mg/dL 8.7 8.7  --   --  8.7   BILIRUBIN mg/dL <0.2 <0.2  --   --  0.2   ALK PHOS U/L 48 58  --   --  66   ALT (SGPT) U/L 56* 49*  --   --  30   AST (SGOT) U/L 169* 183*  --   --  84*   GLUCOSE mg/dL 153* 209*  --   --  186*     Results from last 7 days   Lab Units 02/05/25  0415 02/04/25  0518 02/03/25 2035 02/03/25  1716   MAGNESIUM mg/dL 2.3 2.5*  --  2.3   PHOSPHORUS mg/dL 1.4* 3.7  --  5.1*   HEMOGLOBIN g/dL 10.5* 12.0*  --  12.6*   HEMOGLOBIN, POC    --   --    < >  --    HEMATOCRIT % 32.5* 37.6  --  40.6   HEMATOCRIT POC   --   --    < >  --    TRIGLYCERIDES mg/dL  --   --   --  49    < > = values in this interval not displayed.       RD to follow up per protocol.    Electronically signed by:  Luna Pete RD  02/05/25 08:05 EST

## 2025-02-05 NOTE — CASE MANAGEMENT/SOCIAL WORK
Continued Stay Note   Paresh     Patient Name: Chu Peralta  MRN: [id number]  Today's Date: 2/5/2025    Admit Date: 2/3/2025    Plan: Plan to return home with spouse, pending clinical course.   Discharge Plan       Row Name 02/05/25 1009       Plan    Plan Plan to return home with spouse, pending clinical course.    Plan Comments DC Barriers: vent 30/5, NG TF, A-line, V-line, C-line, FC, IV Abxs/steroids, Prop/Fent/Levo gtts, Cardio/WC following.                 Expected Discharge Date and Time       Expected Discharge Date Expected Discharge Time    Feb 10, 2025             MARIJA Alexis RN  ICU/CVU   O: [phone]  C: [phone]  [email or web address]

## 2025-02-05 NOTE — CONSULTS
CARDIOLOGY CONSULT      Requesting Provider    Dante Truong MD      PATIENT IDENTIFICATION    Name: Chu Peralta  Age: 59 y.o. Sex: male : 1965  MRN: [id number]    REASON FOR CONSULTATION:  Elevated troponin    CHIEF COMPLAINT:  Shortness of breath    HISTORY OF PRESENT ILLNESS:   History is obtained from chart records as patient is intubated on ventilator.    Patient presented to an outside hospital with a complaint of shortness of breath.  He has a history of COPD and is on oxygen therapy at home.  He has ongoing tobacco abuse.  He ultimately was diagnosed with flu a and possible bacterial pneumonia.  He continued to have shortness of breath at the outside hospital and had originally refused supportive/noninvasive positive pressure ventilation.  He wanted to be a DNR/DNI.  Once the patient was incapacitated by his respiratory distress, the family rescinded his DNI.  He was tried on noninvasive positive pressure ventilation but his condition continued to deteriorate.  He ultimately was intubated and transferred to our facility for further management.    Initial troponin at outside hospital was 161.  On arrival to our facility it had risen to 4495.  Cardiology consultation was undertaken at this point.  ECG and bedside echo were performed.  ECG demonstrated acute inferior injury.  Echocardiogram demonstrated cardiomyopathy with wall motion abnormalities in the inferolateral wall.  Decision was made to take patient emergently to cardiac catheterization lab.  Case was discussed with critical care medicine as well as the patient's family at bedside.  Wife does note that patient has peripheral arterial disease and probable blockages in his legs.    IMPRESSIONS  Acute inferior wall myocardial infarction  Acute on chronic respiratory failure requiring mechanical ventilation  Flu a positive  COPD  Hypertension  Peripheral arterial disease    RECOMMENDATIONS:  Routine STEMI protocols  Patient will be taken  "emergently to cardiac catheterization lab.  Further recommendations pending catheterization findings    Medical History    Past Medical History:   Diagnosis Date    Anxiety     COPD (chronic obstructive pulmonary disease)     Depression     Emphysema lung     Hypertension     Lung nodule     Restless leg         Surgical History    Past Surgical History:   Procedure Laterality Date    CARDIAC CATHETERIZATION N/A 2/3/2025    Procedure: Left Heart Cath;  Surgeon: Dale Peterson DO;  Location: [facility] CATH INVASIVE LOCATION;  Service: Cardiovascular;  Laterality: N/A;    CARDIAC CATHETERIZATION N/A 2/3/2025    Procedure: Percutaneous Coronary Intervention;  Surgeon: Dale Peterson DO;  Location: [facility] CATH INVASIVE LOCATION;  Service: Cardiovascular;  Laterality: N/A;    CARDIAC CATHETERIZATION N/A 2/3/2025    Procedure: Stent SHELLY coronary;  Surgeon: Dale Peterson DO;  Location: [facility] CATH INVASIVE LOCATION;  Service: Cardiovascular;  Laterality: N/A;  RCA        Family History    No family history on file.    Social History    Social History     Tobacco Use    Smoking status: Every Day     Current packs/day: 0.66     Types: Cigarettes    Smokeless tobacco: Not on file   Substance Use Topics    Alcohol use: Not on file        Allergies    Allergies   Allergen Reactions    Coconut Unknown - High Severity    Keflex [Cephalexin] Unknown - Low Severity       REVIEW OF SYSTEMS:  Review of systems could not be obtained due to   patient intubated.    OBJECTIVE     VITAL SIGNS:  Visit Vitals  /66 (BP Location: Left arm, Patient Position: Lying)   Pulse 100   Temp 98.8 °F (37.1 °C)   Resp 28   Ht 175.3 cm (69\")   Wt 78.1 kg (172 lb 2.9 oz)   SpO2 97%   BMI 25.43 kg/m²     Oxygen Therapy  SpO2: 97 %  Pulse Oximetry Type: Continuous  Device (Oxygen Therapy): nasal cannula  Flow (L/min) (Oxygen Therapy): 2  Oxygen Concentration (%): 30  Flowsheet Rows      Flowsheet Row First Filed " "Value   Admission Height 172.7 cm (68\") Documented at 02/03/2025 1651   Admission Weight 76 kg (167 lb 8.8 oz) Documented at 02/03/2025 1651          Intake & Output (last 3 days)         02/02 0701 02/03 0700 02/03 0701  02/04 0700 02/04 0701 02/05 0700 02/05 0701  02/06 0700    P.O.  0 0 30    I.V. (mL/kg)  1295 (17.2) 4134 (52.9) 516 (6.6)    Other   342     NG/GT   395     Total Intake(mL/kg)  1295 (17.2) 4871 (62.4) 546 (7)    Urine (mL/kg/hr)  900 980 (0.5) 1370 (1.5)    Emesis/NG output  0 40     Stool  0      Total Output  900 1020 1370    Net  +395 +3851 -824            Urine Unmeasured Occurrence  0 x      Stool Unmeasured Occurrence  0 x      Emesis Unmeasured Occurrence  0 x            Lines, Drains & Airways       Active LDAs       Name Placement date Placement time Site Days    CVC Triple Lumen 02/04/25 Right Internal jugular 02/04/25  0400  Internal jugular  1    Peripheral IV 02/03/25 1652 Distal;Left;Posterior Forearm 02/03/25  1652  Forearm  2    Peripheral IV 02/03/25 1655 Anterior;Distal;Right;Upper Arm 02/03/25  1655  Arm  2    NG/OG Tube 16 Fr Left nostril 02/05/25  1343  Left nostril  less than 1    Urethral Catheter 02/04/25  0500  -- 1    Arterial Sheath 6 Fr. Right Femoral 02/03/25  1904  Femoral  1    Venous Sheath 6 Fr. Right Femoral 02/03/25  1903  Femoral  1                  /66 (BP Location: Left arm, Patient Position: Lying)   Pulse 100   Temp 98.8 °F (37.1 °C)   Resp 28   Ht 175.3 cm (69\")   Wt 78.1 kg (172 lb 2.9 oz)   SpO2 97%   BMI 25.43 kg/m²     INTAKE/OUTPUT:    Intake/Output this shift:  I/O this shift:  In: 546 [P.O.:30; I.V.:516]  Out: 1370 [Urine:1370]  Intake/Output last 3 shifts:  I/O last 3 completed shifts:  In: 6166 [I.V.:5429; Other:342; NG/GT:395]  Out: 1570 [Urine:1530; Emesis/NG output:40]      PHYSICAL EXAM:    General: Sedated on ventilator.  Critically ill  Head:  Normocephalic, atraumatic, mucous membranes moist.  Endotracheal " tube  Eyes:  Conjunctivae/corneas clear, EOM's intact     Neck:  Supple,  no bruit  Lungs: Course of mechanical  Chest wall: No tenderness  Heart::  Regular rate and rhythm, S1 and S2 normal, 1/6 holosystolic murmur.  No rub or gallop  Abdomen: Soft, nontender, nondistended, bowel sounds active  Extremities: No cyanosis, clubbing, or edema  Pulses: Diminished pedal pulses  Skin:  No rashes or lesions  Neuro/psych: Sedated on ventilator    Scheduled Meds:      aspirin, 81 mg, Nasogastric, Daily  atorvastatin, 40 mg, Nasogastric, Nightly  budesonide, 0.5 mg, Nebulization, BID - RT  cefTRIAXone, 2,000 mg, Intravenous, Q24H  chlorhexidine, 15 mL, Mouth/Throat, Q12H  empagliflozin, 10 mg, Oral, Daily  enoxaparin, 40 mg, Subcutaneous, Q24H  escitalopram, 20 mg, Nasogastric, Daily  furosemide, 40 mg, Intravenous, Once  insulin glargine, 10 Units, Subcutaneous, Nightly  insulin lispro, 4-24 Units, Subcutaneous, Q6H  lansoprazole, 30 mg, Nasogastric, Q AM  [Held by provider] lisinopril, 10 mg, Oral, Daily  magnesium sulfate, 2 g, Intravenous, Once  methylPREDNISolone sodium succinate, 40 mg, Intravenous, Daily  [Held by provider] metoprolol succinate XL, 25 mg, Oral, Daily  metoprolol tartrate, 2.5 mg, Intravenous, Once  metoprolol tartrate, 2.5 mg, Intravenous, Once  mupirocin, 1 Application, Each Nare, BID  polyethylene glycol, 17 g, Oral, Daily  sodium chloride, 10 mL, Intravenous, Q12H  ticagrelor, 90 mg, Nasogastric, BID        Continuous Infusions:         PRN Meds:      acetaminophen    aluminum-magnesium hydroxide-simethicone    senna-docusate sodium **AND** polyethylene glycol **AND** bisacodyl **AND** bisacodyl    dextrose    dextrose    diphenhydrAMINE    glucagon (human recombinant)    ipratropium-albuterol    nicotine    nitroglycerin    ondansetron ODT **OR** ondansetron    sodium chloride    sodium chloride     Data Review:     X-rays, labs reviewed personally by provider.    CBC    Results from last 7 days    Lab Units 02/05/25 0415 02/04/25 0518 02/03/25 2035 02/03/25 1716 02/03/25  1710   WBC 10*3/mm3 18.99* 18.70*  --  25.64*  --    HEMOGLOBIN g/dL 10.5* 12.0*  --  12.6*  --    HEMOGLOBIN, POC g/dL  --   --  13.2  --  13.9   PLATELETS 10*3/mm3 283 330  --  336  --      Cr Clearance Estimated Creatinine Clearance: 171.4 mL/min (A) (by C-G formula based on SCr of 0.5 mg/dL (L)).  Coag   Results from last 7 days   Lab Units 02/05/25 0415 02/04/25 0518 02/03/25 1716   INR   --   --  1.21*   APTT seconds 37.2* 32.9 >200.0*     HbA1C   Lab Results   Component Value Date    HGBA1C 6.06 (H) 02/03/2025     Blood Glucose   Glucose   Date/Time Value Ref Range Status   02/05/2025 1829 212 (H) 70 - 105 mg/dL Final     Comment:     Serial Number: [id number]:  [id number]   02/05/2025 1430 113 (H) 70 - 105 mg/dL Final     Comment:     Serial Number: [id number]:  [id number]   02/05/2025 0652 126 (H) 70 - 105 mg/dL Final     Comment:     Serial Number: [id number]:  [id number]   02/05/2025 0336 173 (H) 74 - 100 mg/dL Final     Comment:     Serial Number: [id number]:  [id number]   02/04/2025 2347 169 (H) 70 - 105 mg/dL Final     Comment:     Serial Number: [id number]:  [id number]   02/04/2025 2030 169 (H) 70 - 105 mg/dL Final     Comment:     Serial Number: [id number]:  [id number]   02/04/2025 1823 186 (H) 70 - 105 mg/dL Final     Comment:     Serial Number: [id number]:  [id number]   02/04/2025 1233 238 (H) 70 - 105 mg/dL Final     Comment:     Serial Number: [id number]:  [id number]     Infection   Results from last 7 days   Lab Units 02/03/25  1806 02/03/25  1716   BLOODCX  No growth at 2 days No growth at 2 days   PROCALCITONIN ng/mL  --  37.40*     CMP   Results from last 7 days   Lab Units 02/05/25  0415 02/04/25  0518 02/03/25  2320 02/03/25  2035 02/03/25  1716 02/03/25  1710   SODIUM mmol/L 138 139  --   --  135*  --    POTASSIUM mmol/L 3.8 4.7 5.7*  --  5.1  --    CHLORIDE mmol/L  "106 105  --   --  101  --    CO2 mmol/L 24.0 21.1*  --   --  25.4  --    BUN mg/dL 18 20  --   --  11  --    CREATININE mg/dL 0.50* 1.02  --  1.19 0.91 0.89   GLUCOSE mg/dL 153* 209*  --   --  186*  --    ALBUMIN g/dL 3.1* 3.8  --   --  4.0  --    BILIRUBIN mg/dL <0.2 <0.2  --   --  0.2  --    ALK PHOS U/L 48 58  --   --  66  --    AST (SGOT) U/L 169* 183*  --   --  84*  --    ALT (SGPT) U/L 56* 49*  --   --  30  --    AMYLASE U/L  --   --   --   --  64  --    LIPASE U/L  --   --   --   --  20  --      ABG    Results from last 7 days   Lab Units 02/05/25  0336 02/04/25  0733 02/04/25  0250 02/03/25  2355 02/03/25  2248 02/03/25  2143 02/03/25 2035   PH, ARTERIAL pH units 7.478* 7.358 7.295* 7.160* 7.123* 7.153* 7.117*   PCO2, ARTERIAL mm Hg 37.2 42.9 49.1* 65.0* 78.0* 71.3* 78.1*   PO2 ART mm Hg 120.0* 180.0* 97.0 143.1* 74.5* 100.0 172.9*   O2 SATURATION ART % 99.0* 99.5* 96.6 98.3* 88.0* 95.1 98.9*   BASE EXCESS ART mmol/L 3.9* -1.4* -3.1* -6.5* -5.7* -5.4* -5.8*     UA    Results from last 7 days   Lab Units 02/03/25  1730   NITRITE UA  Negative   WBC UA /HPF 0-2   BACTERIA UA /HPF None Seen   SQUAM EPITHEL UA /HPF None Seen     LILIANE  No results found for: \"POCMETH\", \"POCAMPHET\", \"POCBARBITUR\", \"POCBENZO\", \"POCCOCAINE\", \"POCOPIATES\", \"POCOXYCODO\", \"POCPHENCYC\", \"POCPROPOXY\", \"POCTHC\", \"POCTRICYC\"  Lysis Labs   Results from last 7 days   Lab Units 02/05/25  0415 02/04/25  0518 02/03/25 2035 02/03/25  1716 02/03/25  1710   INR   --   --   --  1.21*  --    APTT seconds 37.2* 32.9  --  >200.0*  --    HEMOGLOBIN g/dL 10.5* 12.0*  --  12.6*  --    HEMOGLOBIN, POC g/dL  --   --  13.2  --  13.9   PLATELETS 10*3/mm3 283 330  --  336  --    CREATININE mg/dL 0.50* 1.02 1.19 0.91 0.89     Radiology(recent) XR Chest 1 View    Result Date: 2/5/2025  Impression: 1. Lines and support tubes in expected location. 2. No pneumothorax. 3. Clear lungs. Electronically Signed: Celso Cox MD  2/5/2025 7:16 AM EST  Workstation ID: " [id number]    XR Chest 1 View    Result Date: 2/4/2025  Impression: 1.New right internal jugular central venous catheter terminates in the mid SVC. 2.Gastric suction tube terminates in the stomach with the sideport just below the gastroesophageal junction. 3.Stable endotracheal tube. 4.No acute cardiopulmonary abnormality. Electronically Signed: Alvaro Poe MD  2/4/2025 5:11 AM EST  Workstation ID: [id number]    XR Abdomen KUB    Result Date: 2/4/2025  Impression: Gastric suction tube terminates in the proximal stomach. Electronically Signed: Alvaro Poe MD  2/4/2025 1:52 AM EST  Workstation ID: [id number]       Results from last 7 days   Lab Units 02/04/25  0518 02/03/25  1716   CK TOTAL U/L  --  1,076*   HSTROP T ng/L 2,970* 1,288*       ECG/EMG Results (most recent)       Procedure Component Value Units Date/Time    ECG 12 Lead Other; elevated troponin [[id number]] Collected: 02/03/25 1739     Updated: 02/03/25 1740     QT Interval 317 ms      QTC Interval 440 ms     Narrative:      HEART GACH=756  bpm  RR Cmwijapu=491  ms  DE Urjdpwds=422  ms  P Horizontal Axis=6  deg  P Front Axis=74  deg  QRSD Interval=84  ms  QT Xujfxdcm=113  ms  SNoW=203  ms  QRS Axis=69  deg  T Wave Axis=60  deg  - ABNORMAL ECG -  Sinus tachycardia  Ventricular premature complex  Low voltage, precordial leads  Probable anteroseptal infarct, old  ST elevation, consider inferior injury  Date and Time of Study:2025-02-03 17:39:35    Adult Transthoracic Echo Complete w/ Color, Spectral and Contrast if Necessary Per Protocol [[id number]] Resulted: 02/03/25 2054     Updated: 02/03/25 2054     LVIDd 4.7 cm      LVIDs 4.1 cm      IVSd 1.00 cm      LVPWd 1.00 cm      FS 12.8 %      IVS/LVPW 1.00 cm      ESV(cubed) 68.9 ml      EDV(cubed) 103.8 ml      LV mass(C)d 164.5 grams      LVOT area 2.8 cm2      LVOT diam 1.90 cm      EDV(MOD-sp4) 124.0 ml      ESV(MOD-sp4) 90.1 ml      SV(MOD-sp4) 33.9 ml      EF(MOD-sp4) 27.3 %      MV E max donis 42.3 cm/sec       MV A max trevon 62.0 cm/sec      MV dec time 0.14 sec      MV E/A 0.68     Med Peak E' Trevon 5.3 cm/sec      Lat Peak E' Trevon 5.2 cm/sec      TR max trevon 261.0 cm/sec      Avg E/e' ratio 8.06     SV(LVOT) 24.0 ml      TAPSE (>1.6) 1.56 cm      RV S' 12.3 cm/sec      LA dimension (2D)  3.3 cm      LV V1 max 57.9 cm/sec      LV V1 max PG 1.34 mmHg      LV V1 mean PG 1.00 mmHg      LV V1 VTI 8.5 cm      Ao pk trevon 88.2 cm/sec      Ao max PG 3.1 mmHg      Ao mean PG 2.00 mmHg      Ao V2 VTI 13.7 cm      EFREM(I,D) 1.75 cm2      AI P1/2t 374.7 msec      MV max PG 1.54 mmHg      MV mean PG 1.00 mmHg      MV V2 VTI 8.2 cm      MV P1/2t 16.1 msec      MVA(P1/2t) 13.7 cm2      MVA(VTI) 2.9 cm2      MV dec slope 1,020 cm/sec2      MR max trevon 215.0 cm/sec      MR max PG 18.5 mmHg      TR max PG 27.2 mmHg      RVSP(TR) 30.2 mmHg      RAP systole 3.0 mmHg      RV V1 max PG 0.58 mmHg      RV V1 max 38.1 cm/sec      RV V1 VTI 4.6 cm      PA V2 max 57.6 cm/sec      PA acc time 0.12 sec      ACS 2.30 cm      Sinus 3.7 cm      EF(MOD-bp) 27.0 %      Dimensionless Index 0.72 (DI)     Narrative:        Left ventricular systolic function is severely decreased. Left   ventricular ejection fraction appears to be 26 - 30%.    Left ventricular diastolic function is consistent with (grade I)   impaired relaxation.    Moderately reduced right ventricular systolic function noted.    The right ventricular cavity is mildly dilated.    Estimated right ventricular systolic pressure from tricuspid   regurgitation is normal (<35 mmHg). Calculated right ventricular systolic   pressure from tricuspid regurgitation is 30 mmHg.      ECG 12 Lead Pre-Op / Pre-Procedure [[id number]] Collected: 02/04/25 0526     Updated: 02/04/25 0528     QT Interval 312 ms      QTC Interval 425 ms     Narrative:      HEART BHQA=348  bpm  RR Vaesrstq=787  ms  UT Jqckehvs=461  ms  P Horizontal Axis=-16  deg  P Front Axis=82  deg  QRSD Interval=81  ms  QT Farfksdz=830  ms  QPeC=059   ms  QRS Axis=76  deg  T Wave Axis=72  deg  - ABNORMAL ECG -  Sinus tachycardia  Probable inferior infarct, recent  Consider anterior infarct  Date and Time of Study:2025-02-04 05:26:28    Telemetry Scan [[id number]] Resulted: 02/03/25     Updated: 02/04/25 0912    Telemetry Scan [[id number]] Resulted: 02/03/25     Updated: 02/04/25 0942    Telemetry Scan [[id number]] Resulted: 02/03/25     Updated: 02/04/25 1117    Telemetry Scan [[id number]] Resulted: 02/03/25     Updated: 02/04/25 1229    Telemetry Scan [[id number]] Resulted: 02/03/25     Updated: 02/04/25 1429    Telemetry Scan [[id number]] Resulted: 02/03/25     Updated: 02/04/25 1631    Telemetry Scan [[id number]] Resulted: 02/03/25     Updated: 02/05/25 0829    Telemetry Scan [[id number]] Resulted: 02/03/25     Updated: 02/05/25 0837    Telemetry Scan [[id number]] Resulted: 02/03/25     Updated: 02/05/25 0850    Telemetry Scan [[id number]] Resulted: 02/03/25     Updated: 02/05/25 0856    Telemetry Scan [[id number]] Resulted: 02/03/25     Updated: 02/05/25 1247    Telemetry Scan [[id number]] Resulted: 02/03/25     Updated: 02/05/25 1604    ECG 12 Lead Chest Pain [[id number]] Collected: 02/05/25 1818     Updated: 02/05/25 1819     QT Interval 309 ms      QTC Interval 460 ms     Narrative:      HEART HFDC=069  bpm  RR Nhvdrwuf=253  ms  VA Interval=45  ms  P Horizontal Axis=-42  deg  P Front Axis=0  deg  QRSD Ackubglu=075  ms  QT Wevbvvmi=526  ms  WZpZ=638  ms  QRS Axis=92  deg  T Wave Axis=-74  deg  - ABNORMAL ECG -  Sinus tachycardia  Multiform ventricular premature complexes  Nonspecific intraventricular conduction delay  ST elevation, consider lateral injury  Date and Time of Study:2025-02-05 18:18:27            Imaging:  Imaging Results (Last 72 Hours)       Procedure Component Value Units Date/Time    XR Chest 1 View [[id number]] Collected: 02/05/25 0715     Updated: 02/05/25 0718    Narrative:      XR CHEST 1 VW    Date of Exam: 2/5/2025 4:45 AM EST    Indication: acute  on chronic hypoxic respiratory failure    Comparison: 2/4/2025    Findings:  ET tube tip above the jose guadalupe. Esophagogastric tube tip below the diaphragm. Right IJ central venous catheter tip is at the low SVC. The lungs are without consolidation. There is no pneumothorax or pleural effusion.      Impression:      Impression:  1. Lines and support tubes in expected location.  2. No pneumothorax.  3. Clear lungs.          Electronically Signed: Celso Cox MD    2/5/2025 7:16 AM EST    Workstation ID: [id number]    XR Chest 1 View [[id number]] Collected: 02/04/25 0510     Updated: 02/04/25 0514    Narrative:      XR CHEST 1 VW    Date of Exam: 2/4/2025 4:50 AM EST    Indication: line placement, resp failure (no need to repeat AM cxr for resp failure)    Comparison: 2/3/2025.    Findings:  Stable endotracheal tube. Gastric suction tube courses below the diaphragm and terminates in the stomach with the sideport just below the gastroesophageal junction. There is a new right internal jugular central venous catheter terminating in the mid SVC.   The lungs are clear. No pneumothorax or pleural effusion. Heart size and pulmonary vasculature appear within normal limits. No acute osseous abnormalities.      Impression:      Impression:  1.New right internal jugular central venous catheter terminates in the mid SVC.  2.Gastric suction tube terminates in the stomach with the sideport just below the gastroesophageal junction.  3.Stable endotracheal tube.  4.No acute cardiopulmonary abnormality.          Electronically Signed: Alvaro Poe MD    2/4/2025 5:11 AM EST    Workstation ID: [id number]    XR Abdomen KUB [[id number]] Collected: 02/04/25 0151     Updated: 02/04/25 0154    Narrative:      XR ABDOMEN KUB    Date of Exam: 2/4/2025 1:48 AM EST    Indication: NG placement    Comparison: Correlation with CT PE study 2/3/2025.    Findings:  Gastric suction tube terminates in the proximal stomach. The sideport is just below the GE  junction. No distended bowel in the upper abdomen.      Impression:      Impression:  Gastric suction tube terminates in the proximal stomach.          Electronically Signed: Alvaro Poe MD    2/4/2025 1:52 AM EST    Workstation ID: [id number]    XR Chest 1 View [[id number]] Collected: 02/03/25 1748     Updated: 02/03/25 1752    Narrative:      XR CHEST 1 VW    Date of Exam: 2/3/2025 5:19 PM EST    Indication: Acute on chronic respiratory failure, assess endotracheal tube position.    Comparison: 2/3/2025 performed at [facility].    Findings:  The tip of the endotracheal tube is in good position terminating at the level of the aortic knob. The heart size is normal. There is underlying emphysema. The pulmonary vascular markings are normal. The lungs and pleural spaces are clear. There are mild   chronic age-related changes involving the bony thorax.      Impression:      Impression:  1.The tip of the endotracheal tube is in good position terminating at the level of the aortic knob.  2.Emphysema.        Electronically Signed: Perez Veronica MD    2/3/2025 5:50 PM EST    Workstation ID: [id number]              Dale Peterson DO  02/05/25  18:36 EST

## 2025-02-05 NOTE — PROGRESS NOTES
Cardiology Progress  Note      Patient Care Team:  Deanne Prerin APRN as PCP - General (Nurse Practitioner)  Jm Gaona MD as Consulting Physician (Pulmonary Disease)    PATIENT IDENTIFICATION  Name: Chu Peralta  Age: 59 y.o.  Sex: male  :  1965  MRN: [id number]      Length of stay:    LOS: 2 days           REASON FOR FOLLOW-UP:  Acute inferior wall ST elevation MI  Severe single vessel coronary artery disease  Severe LV dysfunction        INTERVAL HISTORY  Patient seen and examined, chart and labs reviewed.  Patient remains intubated and sedated in the intensive care unit.  Family member at bedside.  On vasopressor support-levo at 0.09 mcg/kg/h.  Also on fentanyl/propofol drips.  Rhythm sinus 88 bpm.  /59.  Discussed with attending nurse-patient had couple hours of dobutamine drip overnight per intensivist recommendations with improvement in lactate-1.9 this morning.  CVP 7.  Albuterol treatments now as needed due to elevated heart rates      SUBJECTIVE    Unable to obtain subjective data secondary to intubated/sedated status      REVIEW OF SYSTEMS:  Review of systems could not be obtained due to   patient intubated.    OBJECTIVE   Phosphorus 1.4  /ALT 56  Lactate 0.9      ASSESSMENT  Acute inferior wall ST elevation MI status post PCI-RCA  Severe global LV systolic dysfunction/ischemic dilated cardiomyopathy  Acute respiratory failure requiring endotracheal intubation  Acute on chronic COPD exacerbation  Influenza A infection  History of chronic hypoxic respiratory failure on home O2  Hypotension requiring vasopressor support  Peripheral arterial disease-severe iliofemoral disease bilaterally  Heart failure with reduced ejection fraction  Shock-distributive/cardiovascular      RECOMMENDATIONS  Intensivist managing critical care needs.  Extubate as tolerated.  Continue uninterrupted dual antiplatelet therapy consisting of aspirin and Brilinta.  Unable to initiate goal-directed medical  "therapy due to hypotension requiring vasopressor support.  Continue statin.  Monitor and replete electrolytes per protocol.  Cath findings and plan of care reviewed with the wife and daughter at bedside.  Continue close monitoring          CHF Guideline Directed Medical Therapy  Beta Blocker:   ARNI/ACE/ARB:   SGLT 2 inhibitors:   Diuretics:   Aldosterone Antagonist:   Vasodilators & Nitrates:       Vital Signs  Visit Vitals  /66 (BP Location: Left arm, Patient Position: Lying)   Pulse 100   Temp 98.8 °F (37.1 °C)   Resp 28   Ht 170.2 cm (67\")   Wt 78.1 kg (172 lb 2.9 oz)   SpO2 97%   BMI 26.97 kg/m²     Oxygen Therapy  SpO2: 97 %  Pulse Oximetry Type: Continuous  Device (Oxygen Therapy): nasal cannula  Flow (L/min) (Oxygen Therapy): 2  Oxygen Concentration (%): 30  Flowsheet Rows      Flowsheet Row First Filed Value   Admission Height 172.7 cm (68\") Documented at 02/03/2025 1651   Admission Weight 76 kg (167 lb 8.8 oz) Documented at 02/03/2025 1651          Intake & Output (last 3 days)         02/01 0701  02/02 0700 02/02 0701  02/03 0700 02/03 0701  02/04 0700 02/04 0701  02/05 0700    P.O.   0     I.V. (mL/kg)   1295 (17.2)     Total Intake(mL/kg)   1295 (17.2)     Urine (mL/kg/hr)   900     Emesis/NG output   0     Stool   0     Total Output   900     Net   +395             Urine Unmeasured Occurrence   0 x     Stool Unmeasured Occurrence   0 x     Emesis Unmeasured Occurrence   0 x           Lines, Drains & Airways       Active LDAs       Name Placement date Placement time Site Days    CVC Triple Lumen 02/04/25 Right Internal jugular 02/04/25  0400  Internal jugular  less than 1    Peripheral IV 02/03/25 1652 Distal;Left;Posterior Forearm 02/03/25  1652  Forearm  less than 1    Peripheral IV 02/03/25 1655 Anterior;Distal;Right;Upper Arm 02/03/25  1655  Arm  less than 1    NG/OG Tube Nasogastric 16 Fr Left nostril 02/03/25  1734  Left nostril  less than 1    Urethral Catheter 02/04/25  0500  -- less than " "1    Hi-Lo Evac ETT 8 02/03/25  --  Oral  1    Arterial Line 02/04/25 Right Radial 02/04/25  0400  Radial  less than 1    Arterial Sheath 6 Fr. Right Femoral 02/03/25  1904  Femoral  less than 1    Venous Sheath 6 Fr. Right Femoral 02/03/25  1903  Femoral  less than 1                           /66 (BP Location: Left arm, Patient Position: Lying)   Pulse 100   Temp 98.8 °F (37.1 °C)   Resp 28   Ht 170.2 cm (67\")   Wt 78.1 kg (172 lb 2.9 oz)   SpO2 97%   BMI 26.97 kg/m²   Intake/Output last 3 shifts:  I/O last 3 completed shifts:  In: 6166 [I.V.:5429; Other:342; NG/GT:395]  Out: 1570 [Urine:1530; Emesis/NG output:40]  Intake/Output this shift:  I/O this shift:  In: 516 [I.V.:516]  Out: 1370 [Urine:1370]    PHYSICAL EXAM:    General: Well-developed, well-nourished, critically ill 59-year-old male who is intubated and sedated in the intensive care unit  Head:  Normocephalic, atraumatic, mucous membranes moist, endotracheal tube noted  Eyes:  Conjunctivae/corneas clear     Neck:  Supple,  no adenopathy; no JVD or bruit  Lungs: Coarse mechanical  Chest wall: No tenderness  Heart::  Regular rate and rhythm, tachycardic, S1 and S2 normal, no murmur, rub or gallop  Abdomen: Soft, nondistended, bowel sounds active  Extremities: No cyanosis, clubbing, or edema   Pulses: 2+ and symmetric all extremities  Skin:  No rashes or lesions  Neuro/psych: Sedated        Scheduled Meds:      aspirin, 81 mg, Nasogastric, Daily  atorvastatin, 40 mg, Nasogastric, Nightly  budesonide, 0.5 mg, Nebulization, BID - RT  cefTRIAXone, 2,000 mg, Intravenous, Q24H  chlorhexidine, 15 mL, Mouth/Throat, Q12H  empagliflozin, 10 mg, Oral, Daily  enoxaparin, 40 mg, Subcutaneous, Q24H  escitalopram, 20 mg, Nasogastric, Daily  insulin glargine, 10 Units, Subcutaneous, Nightly  insulin lispro, 4-24 Units, Subcutaneous, Q6H  lansoprazole, 30 mg, Nasogastric, Q AM  [Held by provider] lisinopril, 10 mg, Oral, Daily  methylPREDNISolone sodium " succinate, 40 mg, Intravenous, Daily  [Held by provider] metoprolol succinate XL, 25 mg, Oral, Daily  mupirocin, 1 Application, Each Nare, BID  polyethylene glycol, 17 g, Oral, Daily  sodium chloride, 10 mL, Intravenous, Q12H  ticagrelor, 90 mg, Nasogastric, BID        Continuous Infusions:           PRN Meds:      acetaminophen    aluminum-magnesium hydroxide-simethicone    senna-docusate sodium **AND** polyethylene glycol **AND** bisacodyl **AND** bisacodyl    dextrose    dextrose    diphenhydrAMINE    glucagon (human recombinant)    ipratropium-albuterol    nicotine    nitroglycerin    ondansetron ODT **OR** ondansetron    sodium chloride    sodium chloride        Results Review:     I reviewed the patient's new clinical results.    CBC    Results from last 7 days   Lab Units 02/05/25 0415 02/04/25 0518 02/03/25 2035 02/03/25 1716 02/03/25  1710   WBC 10*3/mm3 18.99* 18.70*  --  25.64*  --    HEMOGLOBIN g/dL 10.5* 12.0*  --  12.6*  --    HEMOGLOBIN, POC g/dL  --   --  13.2  --  13.9   PLATELETS 10*3/mm3 283 330  --  336  --      Cr Clearance Estimated Creatinine Clearance: 171.4 mL/min (A) (by C-G formula based on SCr of 0.5 mg/dL (L)).  Coag   Results from last 7 days   Lab Units 02/05/25 0415 02/04/25 0518 02/03/25 1716   INR   --   --  1.21*   APTT seconds 37.2* 32.9 >200.0*     HbA1C   Lab Results   Component Value Date    HGBA1C 6.06 (H) 02/03/2025     Blood Glucose   Glucose   Date/Time Value Ref Range Status   02/05/2025 0652 126 (H) 70 - 105 mg/dL Final     Comment:     Serial Number: [id number]:  [id number]   02/05/2025 0336 173 (H) 74 - 100 mg/dL Final     Comment:     Serial Number: [id number]:  [id number]   02/04/2025 2347 169 (H) 70 - 105 mg/dL Final     Comment:     Serial Number: [id number]:  [id number]   02/04/2025 2030 169 (H) 70 - 105 mg/dL Final     Comment:     Serial Number: [id number]:  [id number]   02/04/2025 1823 186 (H) 70 - 105 mg/dL Final     Comment:     Serial  Number: [id number]:  [id number]   02/04/2025 1233 238 (H) 70 - 105 mg/dL Final     Comment:     Serial Number: [id number]:  [id number]   02/04/2025 0759 206 (H) 70 - 105 mg/dL Final     Comment:     Serial Number: [id number]:  [id number]   02/04/2025 0531 209 (H) 70 - 105 mg/dL Final     Comment:     Serial Number: [id number]:  [id number]     Infection   Results from last 7 days   Lab Units 02/03/25  1806 02/03/25  1716   BLOODCX  No growth at 24 hours No growth at 24 hours   PROCALCITONIN ng/mL  --  37.40*     CMP   Results from last 7 days   Lab Units 02/05/25  0415 02/04/25  0518 02/03/25 2320 02/03/25 2035 02/03/25 1716 02/03/25  1710   SODIUM mmol/L 138 139  --   --  135*  --    POTASSIUM mmol/L 3.8 4.7 5.7*  --  5.1  --    CHLORIDE mmol/L 106 105  --   --  101  --    CO2 mmol/L 24.0 21.1*  --   --  25.4  --    BUN mg/dL 18 20  --   --  11  --    CREATININE mg/dL 0.50* 1.02  --  1.19 0.91 0.89   GLUCOSE mg/dL 153* 209*  --   --  186*  --    ALBUMIN g/dL 3.1* 3.8  --   --  4.0  --    BILIRUBIN mg/dL <0.2 <0.2  --   --  0.2  --    ALK PHOS U/L 48 58  --   --  66  --    AST (SGOT) U/L 169* 183*  --   --  84*  --    ALT (SGPT) U/L 56* 49*  --   --  30  --    AMYLASE U/L  --   --   --   --  64  --    LIPASE U/L  --   --   --   --  20  --      ABG    Results from last 7 days   Lab Units 02/05/25  0336 02/04/25  0733 02/04/25  0250 02/03/25 2355 02/03/25 2248 02/03/25  2143 02/03/25  2035   PH, ARTERIAL pH units 7.478* 7.358 7.295* 7.160* 7.123* 7.153* 7.117*   PCO2, ARTERIAL mm Hg 37.2 42.9 49.1* 65.0* 78.0* 71.3* 78.1*   PO2 ART mm Hg 120.0* 180.0* 97.0 143.1* 74.5* 100.0 172.9*   O2 SATURATION ART % 99.0* 99.5* 96.6 98.3* 88.0* 95.1 98.9*   BASE EXCESS ART mmol/L 3.9* -1.4* -3.1* -6.5* -5.7* -5.4* -5.8*     UA    Results from last 7 days   Lab Units 02/03/25  1730   NITRITE UA  Negative   WBC UA /HPF 0-2   BACTERIA UA /HPF None Seen   SQUAM EPITHEL UA /HPF None Seen     LILIANE  No results  "found for: \"POCMETH\", \"POCAMPHET\", \"POCBARBITUR\", \"POCBENZO\", \"POCCOCAINE\", \"POCOPIATES\", \"POCOXYCODO\", \"POCPHENCYC\", \"POCPROPOXY\", \"POCTHC\", \"POCTRICYC\"  Lysis Labs   Results from last 7 days   Lab Units 02/05/25  0415 02/04/25  0518 02/03/25 2035 02/03/25  1716 02/03/25  1710   INR   --   --   --  1.21*  --    APTT seconds 37.2* 32.9  --  >200.0*  --    HEMOGLOBIN g/dL 10.5* 12.0*  --  12.6*  --    HEMOGLOBIN, POC g/dL  --   --  13.2  --  13.9   PLATELETS 10*3/mm3 283 330  --  336  --    CREATININE mg/dL 0.50* 1.02 1.19 0.91 0.89     Radiology(recent) XR Chest 1 View    Result Date: 2/5/2025  Impression: 1. Lines and support tubes in expected location. 2. No pneumothorax. 3. Clear lungs. Electronically Signed: Celso Cox MD  2/5/2025 7:16 AM EST  Workstation ID: [id number]    XR Chest 1 View    Result Date: 2/4/2025  Impression: 1.New right internal jugular central venous catheter terminates in the mid SVC. 2.Gastric suction tube terminates in the stomach with the sideport just below the gastroesophageal junction. 3.Stable endotracheal tube. 4.No acute cardiopulmonary abnormality. Electronically Signed: Alvaro Poe MD  2/4/2025 5:11 AM EST  Workstation ID: [id number]    XR Abdomen KUB    Result Date: 2/4/2025  Impression: Gastric suction tube terminates in the proximal stomach. Electronically Signed: Alvaro Poe MD  2/4/2025 1:52 AM EST  Workstation ID: [id number]    XR Chest 1 View    Result Date: 2/3/2025  Impression: 1.The tip of the endotracheal tube is in good position terminating at the level of the aortic knob. 2.Emphysema. Electronically Signed: Perez Veronica MD  2/3/2025 5:50 PM EST  Workstation ID: [id number]       Results from last 7 days   Lab Units 02/04/25 0518 02/03/25  1716   CK TOTAL U/L  --  1,076*   HSTROP T ng/L 2,970* 1,288*       X-rays, labs reviewed personally by physician.    ECG/EMG Results (most recent)       Procedure Component Value Units Date/Time    ECG 12 Lead Other; elevated " troponin [[id number]] Collected: 02/03/25 1739     Updated: 02/03/25 1740     QT Interval 317 ms      QTC Interval 440 ms     Narrative:      HEART FZNM=614  bpm  RR Gloastyv=614  ms  TN Qgeciwyj=625  ms  P Horizontal Axis=6  deg  P Front Axis=74  deg  QRSD Interval=84  ms  QT Ixakmjlc=711  ms  KApC=194  ms  QRS Axis=69  deg  T Wave Axis=60  deg  - ABNORMAL ECG -  Sinus tachycardia  Ventricular premature complex  Low voltage, precordial leads  Probable anteroseptal infarct, old  ST elevation, consider inferior injury  Date and Time of Study:2025-02-03 17:39:35    Adult Transthoracic Echo Complete w/ Color, Spectral and Contrast if Necessary Per Protocol [[id number]] Resulted: 02/03/25 2054     Updated: 02/03/25 2054     LVIDd 4.7 cm      LVIDs 4.1 cm      IVSd 1.00 cm      LVPWd 1.00 cm      FS 12.8 %      IVS/LVPW 1.00 cm      ESV(cubed) 68.9 ml      EDV(cubed) 103.8 ml      LV mass(C)d 164.5 grams      LVOT area 2.8 cm2      LVOT diam 1.90 cm      EDV(MOD-sp4) 124.0 ml      ESV(MOD-sp4) 90.1 ml      SV(MOD-sp4) 33.9 ml      EF(MOD-sp4) 27.3 %      MV E max trevon 42.3 cm/sec      MV A max trevon 62.0 cm/sec      MV dec time 0.14 sec      MV E/A 0.68     Med Peak E' Trevon 5.3 cm/sec      Lat Peak E' Trevon 5.2 cm/sec      TR max trevon 261.0 cm/sec      Avg E/e' ratio 8.06     SV(LVOT) 24.0 ml      TAPSE (>1.6) 1.56 cm      RV S' 12.3 cm/sec      LA dimension (2D)  3.3 cm      LV V1 max 57.9 cm/sec      LV V1 max PG 1.34 mmHg      LV V1 mean PG 1.00 mmHg      LV V1 VTI 8.5 cm      Ao pk trevon 88.2 cm/sec      Ao max PG 3.1 mmHg      Ao mean PG 2.00 mmHg      Ao V2 VTI 13.7 cm      EFREM(I,D) 1.75 cm2      AI P1/2t 374.7 msec      MV max PG 1.54 mmHg      MV mean PG 1.00 mmHg      MV V2 VTI 8.2 cm      MV P1/2t 16.1 msec      MVA(P1/2t) 13.7 cm2      MVA(VTI) 2.9 cm2      MV dec slope 1,020 cm/sec2      MR max trevon 215.0 cm/sec      MR max PG 18.5 mmHg      TR max PG 27.2 mmHg      RVSP(TR) 30.2 mmHg      RAP systole 3.0 mmHg      RV V1  max PG 0.58 mmHg      RV V1 max 38.1 cm/sec      RV V1 VTI 4.6 cm      PA V2 max 57.6 cm/sec      PA acc time 0.12 sec      ACS 2.30 cm      Sinus 3.7 cm      EF(MOD-bp) 27.0 %      Dimensionless Index 0.72 (DI)     Narrative:        Left ventricular systolic function is severely decreased. Left   ventricular ejection fraction appears to be 26 - 30%.    Left ventricular diastolic function is consistent with (grade I)   impaired relaxation.    Moderately reduced right ventricular systolic function noted.    The right ventricular cavity is mildly dilated.    Estimated right ventricular systolic pressure from tricuspid   regurgitation is normal (<35 mmHg). Calculated right ventricular systolic   pressure from tricuspid regurgitation is 30 mmHg.      ECG 12 Lead Pre-Op / Pre-Procedure [[id number]] Collected: 02/04/25 0526     Updated: 02/04/25 0528     QT Interval 312 ms      QTC Interval 425 ms     Narrative:      HEART HWTP=343  bpm  RR Picxnqid=911  ms  OH Zljrauuh=719  ms  P Horizontal Axis=-16  deg  P Front Axis=82  deg  QRSD Interval=81  ms  QT Fcldxxpb=398  ms  KNpX=045  ms  QRS Axis=76  deg  T Wave Axis=72  deg  - ABNORMAL ECG -  Sinus tachycardia  Probable inferior infarct, recent  Consider anterior infarct  Date and Time of Study:2025-02-04 05:26:28    Telemetry Scan [[id number]] Resulted: 02/03/25     Updated: 02/04/25 0912    Telemetry Scan [[id number]] Resulted: 02/03/25     Updated: 02/04/25 0942    Telemetry Scan [[id number]] Resulted: 02/03/25     Updated: 02/04/25 1117    Telemetry Scan [[id number]] Resulted: 02/03/25     Updated: 02/04/25 1229    Telemetry Scan [[id number]] Resulted: 02/03/25     Updated: 02/04/25 1429    Telemetry Scan [[id number]] Resulted: 02/03/25     Updated: 02/04/25 1631    Telemetry Scan [[id number]] Resulted: 02/03/25     Updated: 02/05/25 0829    Telemetry Scan [[id number]] Resulted: 02/03/25     Updated: 02/05/25 0837    Telemetry Scan [[id number]] Resulted: 02/03/25     Updated:  02/05/25 0850    Telemetry Scan [[id number]] Resulted: 02/03/25     Updated: 02/05/25 0856    Telemetry Scan [[id number]] Resulted: 02/03/25     Updated: 02/05/25 1247              Medication Review:   I have reviewed the patient's current medication list  Scheduled Meds:aspirin, 81 mg, Nasogastric, Daily  atorvastatin, 40 mg, Nasogastric, Nightly  budesonide, 0.5 mg, Nebulization, BID - RT  cefTRIAXone, 2,000 mg, Intravenous, Q24H  chlorhexidine, 15 mL, Mouth/Throat, Q12H  empagliflozin, 10 mg, Oral, Daily  enoxaparin, 40 mg, Subcutaneous, Q24H  escitalopram, 20 mg, Nasogastric, Daily  insulin glargine, 10 Units, Subcutaneous, Nightly  insulin lispro, 4-24 Units, Subcutaneous, Q6H  lansoprazole, 30 mg, Nasogastric, Q AM  [Held by provider] lisinopril, 10 mg, Oral, Daily  methylPREDNISolone sodium succinate, 40 mg, Intravenous, Daily  [Held by provider] metoprolol succinate XL, 25 mg, Oral, Daily  mupirocin, 1 Application, Each Nare, BID  polyethylene glycol, 17 g, Oral, Daily  sodium chloride, 10 mL, Intravenous, Q12H  ticagrelor, 90 mg, Nasogastric, BID      Continuous Infusions:     PRN Meds:.  acetaminophen    aluminum-magnesium hydroxide-simethicone    senna-docusate sodium **AND** polyethylene glycol **AND** bisacodyl **AND** bisacodyl    dextrose    dextrose    diphenhydrAMINE    glucagon (human recombinant)    ipratropium-albuterol    nicotine    nitroglycerin    ondansetron ODT **OR** ondansetron    sodium chloride    sodium chloride    Imaging:  Imaging Results (Last 72 Hours)       Procedure Component Value Units Date/Time    XR Chest 1 View [[id number]] Collected: 02/05/25 0715     Updated: 02/05/25 0718    Narrative:      XR CHEST 1 VW    Date of Exam: 2/5/2025 4:45 AM EST    Indication: acute on chronic hypoxic respiratory failure    Comparison: 2/4/2025    Findings:  ET tube tip above the jose guadalupe. Esophagogastric tube tip below the diaphragm. Right IJ central venous catheter tip is at the low SVC. The  lungs are without consolidation. There is no pneumothorax or pleural effusion.      Impression:      Impression:  1. Lines and support tubes in expected location.  2. No pneumothorax.  3. Clear lungs.          Electronically Signed: Celso Cox MD    2/5/2025 7:16 AM EST    Workstation ID: [id number]    XR Chest 1 View [[id number]] Collected: 02/04/25 0510     Updated: 02/04/25 0514    Narrative:      XR CHEST 1 VW    Date of Exam: 2/4/2025 4:50 AM EST    Indication: line placement, resp failure (no need to repeat AM cxr for resp failure)    Comparison: 2/3/2025.    Findings:  Stable endotracheal tube. Gastric suction tube courses below the diaphragm and terminates in the stomach with the sideport just below the gastroesophageal junction. There is a new right internal jugular central venous catheter terminating in the mid SVC.   The lungs are clear. No pneumothorax or pleural effusion. Heart size and pulmonary vasculature appear within normal limits. No acute osseous abnormalities.      Impression:      Impression:  1.New right internal jugular central venous catheter terminates in the mid SVC.  2.Gastric suction tube terminates in the stomach with the sideport just below the gastroesophageal junction.  3.Stable endotracheal tube.  4.No acute cardiopulmonary abnormality.          Electronically Signed: Alvaro Poe MD    2/4/2025 5:11 AM EST    Workstation ID: [id number]    XR Abdomen KUB [[id number]] Collected: 02/04/25 0151     Updated: 02/04/25 0154    Narrative:      XR ABDOMEN KUB    Date of Exam: 2/4/2025 1:48 AM EST    Indication: NG placement    Comparison: Correlation with CT PE study 2/3/2025.    Findings:  Gastric suction tube terminates in the proximal stomach. The sideport is just below the GE junction. No distended bowel in the upper abdomen.      Impression:      Impression:  Gastric suction tube terminates in the proximal stomach.          Electronically Signed: Alvaro Poe MD    2/4/2025 1:52 AM EST  "   Workstation ID: [id number]    XR Chest 1 View [[id number]] Collected: 02/03/25 1748     Updated: 02/03/25 1752    Narrative:      XR CHEST 1 VW    Date of Exam: 2/3/2025 5:19 PM EST    Indication: Acute on chronic respiratory failure, assess endotracheal tube position.    Comparison: 2/3/2025 performed at [facility].    Findings:  The tip of the endotracheal tube is in good position terminating at the level of the aortic knob. The heart size is normal. There is underlying emphysema. The pulmonary vascular markings are normal. The lungs and pleural spaces are clear. There are mild   chronic age-related changes involving the bony thorax.      Impression:      Impression:  1.The tip of the endotracheal tube is in good position terminating at the level of the aortic knob.  2.Emphysema.        Electronically Signed: Perez Veronica MD    2/3/2025 5:50 PM EST    Workstation ID: [id number]              ROLA Hendricks  02/05/25  14:27 EST       EMR Dragon/Transcription:   \"Dictated utilizing Dragon dictation\".       Electronically signed by ROLA Hendricks, 02/05/25, 2:27 PM EST.    Copied text in this note has been reviewed by me and is accurate as of 02/05/25.    Cardiology attending:  Seen and examined.  Chart and labs reviewed. Independent interpretations of cardiac testing was performed. History and exam findings are verified with above changes noted.  Assessment and plan notated by APC after being formulated by attending consultant.  Note that greater than 50% of the time spent in care of the patient was provided by attending consultant.    Patient was actually extubated this afternoon.  He is currently off all pressors.  A little groggy but appears appropriate.  Family at bedside.  Continue with supportive care.  Continue to monitor rate and rhythm closely.    Physical Exam:    General: Alert, cooperative, no distress, appears stated age  Head:  Normocephalic, atraumatic, mucous membranes " moist  Eyes:  Conjunctivae/corneas clear, EOM's intact     Neck:  Supple,  no bruit  Lungs:           Coarse and diminished bilaterally  Chest wall: No tenderness  Heart::  Regular rate and rhythm, S1 and S2 normal, 1/6 holosystolic murmur.  No rub or gallop  Abdomen: Soft, nontender, nondistended bowel sounds active.  Groin soft no hematoma  Extremities: No cyanosis, clubbing, or edema  Pulses: Diminished pedal pulses  Skin:  No rashes or lesions  Neuro/psych: Alert and appropriate

## 2025-02-06 ENCOUNTER — APPOINTMENT (OUTPATIENT)
Dept: GENERAL RADIOLOGY | Facility: HOSPITAL | Age: 60
DRG: 321 | End: 2025-02-06
Payer: MEDICARE

## 2025-02-06 LAB
ALBUMIN SERPL-MCNC: 3 G/DL (ref 3.5–5.2)
ALBUMIN/GLOB SERPL: 1.3 G/DL
ALP SERPL-CCNC: 51 U/L (ref 39–117)
ALT SERPL W P-5'-P-CCNC: 50 U/L (ref 1–41)
ANION GAP SERPL CALCULATED.3IONS-SCNC: 8 MMOL/L (ref 5–15)
APTT PPP: 102.7 SECONDS (ref 22.7–35.4)
APTT PPP: 35.2 SECONDS (ref 22.7–35.4)
APTT PPP: 38.8 SECONDS (ref 22.7–35.4)
AST SERPL-CCNC: 124 U/L (ref 1–40)
ATMOSPHERIC PRESS: ABNORMAL MM[HG]
BASE EXCESS BLDV CALC-SCNC: 5.9 MMOL/L (ref -2–2)
BASOPHILS # BLD AUTO: 0.02 10*3/MM3 (ref 0–0.2)
BASOPHILS NFR BLD AUTO: 0.2 % (ref 0–1.5)
BDY SITE: ABNORMAL
BILIRUB SERPL-MCNC: <0.2 MG/DL (ref 0–1.2)
BUN SERPL-MCNC: 24 MG/DL (ref 8–23)
BUN/CREAT SERPL: 38.7 (ref 7–25)
CALCIUM SPEC-SCNC: 8.5 MG/DL (ref 8.6–10.5)
CHLORIDE SERPL-SCNC: 103 MMOL/L (ref 98–107)
CO2 BLDA-SCNC: 34.2 MMOL/L (ref 22–29)
CO2 SERPL-SCNC: 28 MMOL/L (ref 22–29)
CREAT SERPL-MCNC: 0.62 MG/DL (ref 0.76–1.27)
D-LACTATE SERPL-SCNC: 0.7 MMOL/L (ref 0.5–2)
DEPRECATED RDW RBC AUTO: 51.6 FL (ref 37–54)
EGFRCR SERPLBLD CKD-EPI 2021: 108.7 ML/MIN/1.73
EOSINOPHIL # BLD AUTO: 0 10*3/MM3 (ref 0–0.4)
EOSINOPHIL NFR BLD AUTO: 0 % (ref 0.3–6.2)
ERYTHROCYTE [DISTWIDTH] IN BLOOD BY AUTOMATED COUNT: 15 % (ref 12.3–15.4)
GLOBULIN UR ELPH-MCNC: 2.4 GM/DL
GLUCOSE BLDC GLUCOMTR-MCNC: 106 MG/DL (ref 70–105)
GLUCOSE BLDC GLUCOMTR-MCNC: 96 MG/DL (ref 70–105)
GLUCOSE BLDC GLUCOMTR-MCNC: 97 MG/DL (ref 70–105)
GLUCOSE BLDC GLUCOMTR-MCNC: 98 MG/DL (ref 70–105)
GLUCOSE SERPL-MCNC: 96 MG/DL (ref 65–99)
HCO3 BLDV-SCNC: 32.5 MMOL/L (ref 22–26)
HCT VFR BLD AUTO: 33.6 % (ref 37.5–51)
HGB BLD-MCNC: 10.4 G/DL (ref 13–17.7)
IMM GRANULOCYTES # BLD AUTO: 0.07 10*3/MM3 (ref 0–0.05)
IMM GRANULOCYTES NFR BLD AUTO: 0.6 % (ref 0–0.5)
INHALED O2 CONCENTRATION: 35 %
INR PPP: 1.05 (ref 0.9–1.1)
LYMPHOCYTES # BLD AUTO: 0.48 10*3/MM3 (ref 0.7–3.1)
LYMPHOCYTES NFR BLD AUTO: 3.9 % (ref 19.6–45.3)
MAGNESIUM SERPL-MCNC: 2.7 MG/DL (ref 1.6–2.4)
MCH RBC QN AUTO: 28.8 PG (ref 26.6–33)
MCHC RBC AUTO-ENTMCNC: 31 G/DL (ref 31.5–35.7)
MCV RBC AUTO: 93.1 FL (ref 79–97)
MODALITY: ABNORMAL
MONOCYTES # BLD AUTO: 0.83 10*3/MM3 (ref 0.1–0.9)
MONOCYTES NFR BLD AUTO: 6.7 % (ref 5–12)
NEUTROPHILS NFR BLD AUTO: 10.99 10*3/MM3 (ref 1.7–7)
NEUTROPHILS NFR BLD AUTO: 88.6 % (ref 42.7–76)
NRBC BLD AUTO-RTO: 0.2 /100 WBC (ref 0–0.2)
PCO2 BLDV: 56.3 MM HG (ref 42–51)
PH BLDV: 7.37 PH UNITS (ref 7.32–7.43)
PHOSPHATE SERPL-MCNC: 3 MG/DL (ref 2.5–4.5)
PLATELET # BLD AUTO: 239 10*3/MM3 (ref 140–450)
PMV BLD AUTO: 9.4 FL (ref 6–12)
PO2 BLDV: 31.3 MM HG (ref 40–42)
POTASSIUM SERPL-SCNC: 4.6 MMOL/L (ref 3.5–5.2)
PROT SERPL-MCNC: 5.4 G/DL (ref 6–8.5)
PROTHROMBIN TIME: 13.7 SECONDS (ref 11.7–14.2)
QT INTERVAL: 302 MS
QT INTERVAL: 309 MS
QT INTERVAL: 352 MS
QT INTERVAL: 359 MS
QT INTERVAL: 369 MS
QTC INTERVAL: 460 MS
QTC INTERVAL: 470 MS
QTC INTERVAL: 502 MS
QTC INTERVAL: 515 MS
QTC INTERVAL: 530 MS
RBC # BLD AUTO: 3.61 10*6/MM3 (ref 4.14–5.8)
SAO2 % BLDCOV: 56.4 % (ref 45–75)
SODIUM SERPL-SCNC: 139 MMOL/L (ref 136–145)
VENTILATOR MODE: ABNORMAL
VT ON VENT VENT: 500 ML
WBC NRBC COR # BLD AUTO: 12.39 10*3/MM3 (ref 3.4–10.8)

## 2025-02-06 PROCEDURE — 93010 ELECTROCARDIOGRAM REPORT: CPT | Performed by: INTERNAL MEDICINE

## 2025-02-06 PROCEDURE — 63710000001 PREDNISONE PER 1 MG: Performed by: EMERGENCY MEDICINE

## 2025-02-06 PROCEDURE — 84100 ASSAY OF PHOSPHORUS: CPT | Performed by: NURSE PRACTITIONER

## 2025-02-06 PROCEDURE — 82948 REAGENT STRIP/BLOOD GLUCOSE: CPT

## 2025-02-06 PROCEDURE — 94799 UNLISTED PULMONARY SVC/PX: CPT

## 2025-02-06 PROCEDURE — 85025 COMPLETE CBC W/AUTO DIFF WBC: CPT | Performed by: INTERNAL MEDICINE

## 2025-02-06 PROCEDURE — 71045 X-RAY EXAM CHEST 1 VIEW: CPT

## 2025-02-06 PROCEDURE — 25010000002 CEFTRIAXONE PER 250 MG

## 2025-02-06 PROCEDURE — 94761 N-INVAS EAR/PLS OXIMETRY MLT: CPT

## 2025-02-06 PROCEDURE — 25010000002 MAGNESIUM SULFATE 2 GM/50ML SOLUTION: Performed by: EMERGENCY MEDICINE

## 2025-02-06 PROCEDURE — 82803 BLOOD GASES ANY COMBINATION: CPT

## 2025-02-06 PROCEDURE — 63710000001 INSULIN GLARGINE PER 5 UNITS: Performed by: NURSE PRACTITIONER

## 2025-02-06 PROCEDURE — 83735 ASSAY OF MAGNESIUM: CPT | Performed by: INTERNAL MEDICINE

## 2025-02-06 PROCEDURE — 93005 ELECTROCARDIOGRAM TRACING: CPT | Performed by: EMERGENCY MEDICINE

## 2025-02-06 PROCEDURE — 94660 CPAP INITIATION&MGMT: CPT

## 2025-02-06 PROCEDURE — 99232 SBSQ HOSP IP/OBS MODERATE 35: CPT | Performed by: INTERNAL MEDICINE

## 2025-02-06 PROCEDURE — 25010000002 AMIODARONE IN DEXTROSE 5% 360-4.14 MG/200ML-% SOLUTION: Performed by: NURSE PRACTITIONER

## 2025-02-06 PROCEDURE — 83605 ASSAY OF LACTIC ACID: CPT | Performed by: EMERGENCY MEDICINE

## 2025-02-06 PROCEDURE — 93005 ELECTROCARDIOGRAM TRACING: CPT | Performed by: NURSE PRACTITIONER

## 2025-02-06 PROCEDURE — 25010000002 DIGOXIN PER 500 MCG: Performed by: INTERNAL MEDICINE

## 2025-02-06 PROCEDURE — 93005 ELECTROCARDIOGRAM TRACING: CPT | Performed by: STUDENT IN AN ORGANIZED HEALTH CARE EDUCATION/TRAINING PROGRAM

## 2025-02-06 PROCEDURE — 85730 THROMBOPLASTIN TIME PARTIAL: CPT | Performed by: INTERNAL MEDICINE

## 2025-02-06 PROCEDURE — 85730 THROMBOPLASTIN TIME PARTIAL: CPT | Performed by: EMERGENCY MEDICINE

## 2025-02-06 PROCEDURE — 25010000002 HEPARIN (PORCINE) 25000-0.45 UT/250ML-% SOLUTION: Performed by: EMERGENCY MEDICINE

## 2025-02-06 PROCEDURE — 85610 PROTHROMBIN TIME: CPT | Performed by: EMERGENCY MEDICINE

## 2025-02-06 PROCEDURE — 80053 COMPREHEN METABOLIC PANEL: CPT | Performed by: NURSE PRACTITIONER

## 2025-02-06 RX ORDER — AMIODARONE HYDROCHLORIDE 200 MG/1
200 TABLET ORAL EVERY 12 HOURS SCHEDULED
Status: DISCONTINUED | OUTPATIENT
Start: 2025-02-06 | End: 2025-02-10 | Stop reason: HOSPADM

## 2025-02-06 RX ORDER — PREDNISONE 20 MG/1
40 TABLET ORAL
Status: DISCONTINUED | OUTPATIENT
Start: 2025-02-06 | End: 2025-02-06

## 2025-02-06 RX ORDER — HEPARIN SODIUM 10000 [USP'U]/100ML
12 INJECTION, SOLUTION INTRAVENOUS
Status: DISCONTINUED | OUTPATIENT
Start: 2025-02-06 | End: 2025-02-07

## 2025-02-06 RX ORDER — MAGNESIUM SULFATE HEPTAHYDRATE 40 MG/ML
2 INJECTION, SOLUTION INTRAVENOUS ONCE
Status: COMPLETED | OUTPATIENT
Start: 2025-02-06 | End: 2025-02-06

## 2025-02-06 RX ORDER — MIDODRINE HYDROCHLORIDE 5 MG/1
5 TABLET ORAL
Status: DISCONTINUED | OUTPATIENT
Start: 2025-02-06 | End: 2025-02-06

## 2025-02-06 RX ORDER — DIGOXIN 0.25 MG/ML
500 INJECTION INTRAMUSCULAR; INTRAVENOUS ONCE
Status: COMPLETED | OUTPATIENT
Start: 2025-02-06 | End: 2025-02-06

## 2025-02-06 RX ORDER — METHYLPREDNISOLONE SODIUM SUCCINATE 40 MG/ML
40 INJECTION, POWDER, LYOPHILIZED, FOR SOLUTION INTRAMUSCULAR; INTRAVENOUS DAILY
Status: COMPLETED | OUTPATIENT
Start: 2025-02-07 | End: 2025-02-08

## 2025-02-06 RX ORDER — DIGOXIN 0.25 MG/ML
250 INJECTION INTRAMUSCULAR; INTRAVENOUS EVERY 6 HOURS
Status: COMPLETED | OUTPATIENT
Start: 2025-02-07 | End: 2025-02-07

## 2025-02-06 RX ADMIN — HEPARIN SODIUM 12 UNITS/KG/HR: 10000 INJECTION, SOLUTION INTRAVENOUS at 10:13

## 2025-02-06 RX ADMIN — Medication 10 ML: at 11:51

## 2025-02-06 RX ADMIN — AMIODARONE HYDROCHLORIDE 1 MG/MIN: 1.8 INJECTION, SOLUTION INTRAVENOUS at 00:48

## 2025-02-06 RX ADMIN — MIDODRINE HYDROCHLORIDE 5 MG: 5 TABLET ORAL at 10:34

## 2025-02-06 RX ADMIN — AMIODARONE HYDROCHLORIDE 200 MG: 200 TABLET ORAL at 21:13

## 2025-02-06 RX ADMIN — BUDESONIDE 0.5 MG: 0.5 INHALANT RESPIRATORY (INHALATION) at 07:01

## 2025-02-06 RX ADMIN — AMIODARONE HYDROCHLORIDE 0.5 MG/MIN: 1.8 INJECTION, SOLUTION INTRAVENOUS at 11:51

## 2025-02-06 RX ADMIN — PREDNISONE 40 MG: 20 TABLET ORAL at 09:15

## 2025-02-06 RX ADMIN — MUPIROCIN 1 APPLICATION: 20 OINTMENT TOPICAL at 21:13

## 2025-02-06 RX ADMIN — ACETAMINOPHEN 650 MG: 325 TABLET, FILM COATED ORAL at 18:15

## 2025-02-06 RX ADMIN — LANSOPRAZOLE 30 MG: 30 TABLET, ORALLY DISINTEGRATING ORAL at 05:48

## 2025-02-06 RX ADMIN — AMIODARONE HYDROCHLORIDE 0.5 MG/MIN: 1.8 INJECTION, SOLUTION INTRAVENOUS at 01:30

## 2025-02-06 RX ADMIN — CHLORHEXIDINE GLUCONATE 15 ML: 1.2 RINSE ORAL at 09:12

## 2025-02-06 RX ADMIN — CEFTRIAXONE 2000 MG: 2 INJECTION, POWDER, FOR SOLUTION INTRAMUSCULAR; INTRAVENOUS at 23:50

## 2025-02-06 RX ADMIN — EMPAGLIFLOZIN 10 MG: 10 TABLET, FILM COATED ORAL at 09:11

## 2025-02-06 RX ADMIN — BUDESONIDE 0.5 MG: 0.5 INHALANT RESPIRATORY (INHALATION) at 20:45

## 2025-02-06 RX ADMIN — MAGNESIUM SULFATE IN WATER FOR 2 G: 40 INJECTION INTRAVENOUS at 08:54

## 2025-02-06 RX ADMIN — Medication 10 ML: at 21:42

## 2025-02-06 RX ADMIN — TICAGRELOR 90 MG: 90 TABLET ORAL at 21:13

## 2025-02-06 RX ADMIN — ESCITALOPRAM 20 MG: 10 TABLET, FILM COATED ORAL at 09:11

## 2025-02-06 RX ADMIN — TICAGRELOR 90 MG: 90 TABLET ORAL at 09:11

## 2025-02-06 RX ADMIN — POLYETHYLENE GLYCOL 3350 17 G: 17 POWDER, FOR SOLUTION ORAL at 10:35

## 2025-02-06 RX ADMIN — ASPIRIN 81 MG CHEWABLE TABLET 81 MG: 81 TABLET CHEWABLE at 09:11

## 2025-02-06 RX ADMIN — DIGOXIN 500 MCG: 0.25 INJECTION INTRAMUSCULAR; INTRAVENOUS at 21:13

## 2025-02-06 RX ADMIN — ATORVASTATIN CALCIUM 40 MG: 40 TABLET, FILM COATED ORAL at 21:13

## 2025-02-06 NOTE — NURSING NOTE
While this RN and tech were turning patient he became tachycardic with heart rate 140- 180, diaphoretic, dyspneic, and complaining of chest pain 7/10.  Call placed to intensivist nurse practitioner, Gurjit.  Gave 2 doses of IV metoprolol 2.5 mg, EKG done, chest xray done, placed on 10 liters high flow nasal canula. O2 sats improved. Labs drawn. 40 mg iv lasix given. Patient placed on bipap with Gurjit at bedside d/t elevated CO2.

## 2025-02-06 NOTE — PROGRESS NOTES
Critical Care Progress Note     Chu Peralta : 1965 MRN:[id number] LOS:3  Rm: 3124/1     Principal Problem: Acute on chronic respiratory failure     Reason for follow up: All the medical problems listed below    Summary     Chu Peralta is a 59 y.o. male with PMH of COPD, Chronic hypoxic respiratory failure on home 2 L, tobacco use disorder, RLS who presented to the hospital for respiratory distress and was admitted on 2/3/2025  4:39 PM with a principal diagnosis of Acute on chronic respiratory failure.      Patient was initially brought into an outside hospital and was in respiratory distress.  Placed on BiPAP.  Initially was DNI however rescinded the DNR and was subsequently intubated.  Patient was being treated with antibiotics for multifocal pneumonia as well as influenza A.  Patient was given broad-spectrum antibiotics and transferred to [facility] for further care.    Upon arrival to [facility] the patient was noted to have concerning EKG for acute coronary syndrome and went emergently to the Cath Lab with cardiology.  He had an RCA occlusion with 1 stent placed.  Postoperative EF was around 15%.  Patient was admitted to the ICU for further care.      Chu Peralta is now Hospital Day: 4    Significant Events / Subjective     24 hour events 25 : Extubated early afternoon yesterday, off vasopressors, Yesterday evening patient went into AF with RVR as well as acute hypoxic respiratory failure. Loaded with mag, started on amio, placed on bipap, diuresed.  Did well overngiht. Still on BIPAP but can dc. Still in fib but better rates. No hypotension.    Assessment / Plan   PLAN by systems:    Neuro  #Analgesia/Sedation  -PRN tylenol      Cardiovascular  Pulse  Av  Min: 100  Max: 167  Resp  Av.5  Min: 22  Max: 36  SpO2  Av.9 %  Min: 95 %  Max: 98 %  Systolic (24hrs), Av , Min:92 , Max:118     Diastolic (24hrs), Av, Min:58, Max:78    #STEMI (RCA)  #HFrEF  (15-20%)  #AF with RVR  -s/p cath with RCA stent placement on 2/3   -ASA/Brillinta  -Statin  -Was briefly on dobutamine 2/4  -Trend CVP  -Starting Jardiance 2/5  -Given mag overnight and started on amio gtt  -Also received metoprolol overnight as well  -Will need to be on BB for his HF so will start that as well  -Lactic cleared  -Will likely need AC given Chads-Vasc score  -Continue to monitor    JOSE?DS?-VASc Score for Atrial Fibrillation Stroke Risk - MDCalc  Calculated on Feb 06 2025 8:56 AM  4 points -> Stroke risk was 4.8% per year in >90,000 patients (the [place] Atrial Fibrillation Cohort Study) and 6.7% risk of stroke/TIA/systemic embolism.    Pulmonary  ABG:  pH, Arterial   Date Value Ref Range Status   02/05/2025 7.360 7.350 - 7.450 pH units Final   02/05/2025 7.180 (C) 7.350 - 7.450 pH units Final   02/05/2025 7.478 (H) 7.350 - 7.450 pH units Final     pO2, Arterial   Date Value Ref Range Status   02/05/2025 106.8 83.0 - 108.0 mm Hg Final   02/05/2025 201.2 (H) 83.0 - 108.0 mm Hg Final   02/05/2025 120.0 (H) 83.0 - 108.0 mm Hg Final     pCO2, Arterial   Date Value Ref Range Status   02/05/2025 56.8 (H) 35.0 - 48.0 mm Hg Final   02/05/2025 74.7 (C) 35.0 - 48.0 mm Hg Final   02/05/2025 37.2 35.0 - 48.0 mm Hg Final     HCO3, Arterial   Date Value Ref Range Status   02/05/2025 32.1 (H) 21.0 - 28.0 mmol/L Final   02/05/2025 27.9 21.0 - 28.0 mmol/L Final   02/05/2025 27.6 21.0 - 28.0 mmol/L Final     Mode: CPAP;PS  FiO2 (%):  [30 %] 30 %  S RR:  [28] 28  S VT:  [500 mL] 500 mL  PEEP/CPAP (cm H2O):  [5 cm H20] 5 cm H20  VT SUP:  [6 cm H20] 6 cm H20  MAP (cm H2O):  [7.5-13] 8.3  #COPD  #Hypoxic, hypercapenic respiratory failure  -On methylpred  -Acidosis worsened yesterday now improved today  -On BIPAP, can take off for 4 hour breaks today  -Nebs  -Maintain O2 sats >92%  -CXR showing: No significant consolidations, PTX, or effusions      GI/Nutrition  #No acute issues  NPO Diet NPO Type: Strict NPO  Patient isn't  on Tube Feeding   Bowel regimen: docusate/miralax prn  Stress ulcer ppx:lansoprazole  -Swallow eval today    Renal/      Lab 02/06/25  0310 02/05/25  2144 02/05/25  1836 02/05/25  0415 02/04/25  0518 02/03/25  2320 02/03/25 2035 02/03/25  1716   SODIUM 139 139  --  138 139  --   --  135*   POTASSIUM 4.6 4.7  --  3.8 4.7 5.7*  --  5.1   CHLORIDE 103 103  --  106 105  --   --  101   CO2 28.0 28.6  --  24.0 21.1*  --   --  25.4   BUN 24* 21  --  18 20  --   --  11   CREATININE 0.62* 0.64* 0.82 0.50* 1.02  --  1.19 0.91   GLUCOSE 96 115*  --  153* 209*  --   --  186*   CALCIUM 8.5* 8.6  --  8.7 8.7  --   --  8.7   PHOSPHORUS 3.0  --   --  1.4* 3.7  --   --  5.1*     #No acute issues  -Monitor electrolytes and UOP  -Monitor nephrotoxic agents administered  -Mims- yes for strict I/o  -lasix x 2 yesterday  -Will hold and PRN dose      Intake/Output Summary (Last 24 hours) at 2/6/2025 0901  Last data filed at 2/6/2025 0552  Gross per 24 hour   Intake 1139 ml   Output 2600 ml   Net -1461 ml       Heme/Onc  Hemoglobin   Date Value Ref Range Status   02/06/2025 10.4 (L) 13.0 - 17.7 g/dL Final   02/05/2025 13.3 12.0 - 17.0 g/dL Final   02/05/2025 10.5 (L) 13.0 - 17.7 g/dL Final   02/04/2025 12.0 (L) 13.0 - 17.7 g/dL Final     Platelets   Date Value Ref Range Status   02/06/2025 239 140 - 450 10*3/mm3 Final   02/05/2025 283 140 - 450 10*3/mm3 Final   02/04/2025 330 140 - 450 10*3/mm3 Final     #No acute issues  -Usual transfusion parameters (HgB <7, Plt <10 unless procedures or bleeding)  -DVT PPX: VTE Prophylaxis:lovenox but will need to anticoagulate  Pharmacologic VTE prophylaxis orders are present.         Endocrine  Glucose   Date Value Ref Range Status   02/06/2025 96 65 - 99 mg/dL Final   02/05/2025 115 (H) 65 - 99 mg/dL Final   02/05/2025 153 (H) 65 - 99 mg/dL Final   02/04/2025 209 (H) 65 - 99 mg/dL Final   02/03/2025 186 (H) 65 - 99 mg/dL Final     #hyperglycemia  -Elevated A1c, no history of DM  -Steroids  exacerbating symptoms  -Goal  140-180  -Accuchecks and SSI    ID  WBC   Date Value Ref Range Status   2025 12.39 (H) 3.40 - 10.80 10*3/mm3 Final   2025 18.99 (H) 3.40 - 10.80 10*3/mm3 Final   2025 18.70 (H) 3.40 - 10.80 10*3/mm3 Final     Temp (24hrs), Av.2 °F (37.9 °C), Min:98.8 °F (37.1 °C), Max:100.8 °F (38.2 °C)    #Shock  #Possible pneumonia  #influenza A  -Leukocytosis to 25 on presentation  -CT chest c/f multifocal pneumonia at OSH  -negative strep/legionella antigen   -No evidence of infection in urine  -RVP positive for influenza A.  Per wife symptoms has been going on >48 hours. No indications for tamiflu at this time  -Monitor for signs of infection  -cefTRIAXone (ROCEPHIN) 2000 mg IVPB in 100 mL NS (MBP)    MSK/Skin  #No acute issues  -PT/OT  -Media tab for wound pictures      Disposition:  remain in ICU    Code status:   Level Of Support Discussed With: Patient  Code Status (Patient has no pulse and is not breathing): CPR (Attempt to Resuscitate)  Medical Interventions (Patient has pulse or is breathing): Full Support           Objective / Physical Exam     Vital signs:  Temp: (!) 100.6 °F (38.1 °C)  BP: 97/70  Heart Rate: (!) 123  Resp: 23  SpO2: 96 %  Weight: 75.3 kg (166 lb 0.1 oz)    Admission Weight: Weight: 76 kg (167 lb 8.8 oz)  Current Weight: Weight: 75.3 kg (166 lb 0.1 oz)    Input/Output in last 24 hours:    Intake/Output Summary (Last 24 hours) at 2025 0901  Last data filed at 2025 0597  Gross per 24 hour   Intake 1139 ml   Output 2600 ml   Net -1461 ml      Net IO Since Admission: 2,740 mL [25 0901]     GENERAL APPEARANCE:  Sitting up in bed on BIPAP in NAD  HEENT:  Normal conjunctivae, normal eyelids  NECK:  trachea midline  HEART: Irregularly irregular rate and rhythm  LUNGS:  CTAB, no wheezing,  ABDOMEN:  Soft, nontender, nondistended   :badillo in place, normal penis  EXTREMITIES:  trace pedal edema  NEUROLOGICAL: Awake/alert/interactive  Skin:   Strong distal pulses, cold toes 3+ capilllary refill    Radiology and Labs     Results from last 7 days   Lab Units 02/06/25 0310 02/05/25 1836 02/05/25 0415 02/04/25 0518 02/03/25 2035 02/03/25  1716   WBC 10*3/mm3 12.39*  --  18.99* 18.70*  --  25.64*   HEMOGLOBIN g/dL 10.4*  --  10.5* 12.0*  --  12.6*   HEMOGLOBIN, POC g/dL  --  13.3  --   --  13.2  --    HEMATOCRIT % 33.6*  --  32.5* 37.6  --  40.6   HEMATOCRIT POC %  --  39  --   --  39  --    PLATELETS 10*3/mm3 239  --  283 330  --  336      Results from last 7 days   Lab Units 02/06/25 0310 02/05/25 0415 02/04/25 0518 02/03/25  1716   PROTIME Seconds  --   --   --  15.3*   INR   --   --   --  1.21*   APTT seconds 38.8* 37.2* 32.9 >200.0*      Results from last 7 days   Lab Units 02/06/25 0310 02/05/25 2144 02/05/25 1836 02/05/25 0415 02/04/25 0518 02/03/25 2320 02/03/25 2035 02/03/25  1716   SODIUM mmol/L 139 139  --  138 139  --   --  135*   POTASSIUM mmol/L 4.6 4.7  --  3.8 4.7 5.7*  --  5.1   CHLORIDE mmol/L 103 103  --  106 105  --   --  101   CO2 mmol/L 28.0 28.6  --  24.0 21.1*  --   --  25.4   BUN mg/dL 24* 21  --  18 20  --   --  11   CREATININE mg/dL 0.62* 0.64* 0.82 0.50* 1.02  --    < > 0.91   GLUCOSE mg/dL 96 115*  --  153* 209*  --   --  186*   MAGNESIUM mg/dL 2.7* 2.9*  --  2.3 2.5*  --   --  2.3   PHOSPHORUS mg/dL 3.0  --   --  1.4* 3.7  --   --  5.1*    < > = values in this interval not displayed.      Results from last 7 days   Lab Units 02/06/25  0310 02/05/25  0415 02/04/25  0518 02/03/25  1716   ALK PHOS U/L 51 48 58 66   AST (SGOT) U/L 124* 169* 183* 84*   ALT (SGPT) U/L 50* 56* 49* 30     Results from last 7 days   Lab Units 02/05/25 2000 02/05/25  1836 02/05/25  0336 02/04/25  0733 02/04/25  0250   PH, ARTERIAL pH units 7.360 7.180* 7.478* 7.358 7.295*   PCO2, ARTERIAL mm Hg 56.8* 74.7* 37.2 42.9 49.1*   PO2 ART mm Hg 106.8 201.2* 120.0* 180.0* 97.0   O2 SATURATION ART % 97.7 99.4* 99.0* 99.5* 96.6   FIO2 % 35 60 40 50  60   HCO3 ART mmol/L 32.1* 27.9 27.6 24.1 23.9   BASE EXCESS ART mmol/L 5.1* -2.2* 3.9* -1.4* -3.1*       XR Chest 1 View    Result Date: 2/6/2025  Impression: No significant interval change. Electronically Signed: Alvaro Loera MD  2/6/2025 7:58 AM EST  Workstation ID: [id number]    XR Chest 1 View    Result Date: 2/5/2025  Impression: 1.Interval extubation. 2.Persistent diffuse interstitial opacities with perihilar and left midlung airspace opacities. Electronically Signed: Alvaro Loera MD  2/5/2025 7:08 PM EST  Workstation ID: [id number]    XR Chest 1 View    Result Date: 2/5/2025  Impression: 1. Lines and support tubes in expected location. 2. No pneumothorax. 3. Clear lungs. Electronically Signed: Celso Cox MD  2/5/2025 7:16 AM EST  Workstation ID: [id number]     Current medications     Scheduled Meds:   aspirin, 81 mg, Nasogastric, Daily  atorvastatin, 40 mg, Nasogastric, Nightly  budesonide, 0.5 mg, Nebulization, BID - RT  cefTRIAXone, 2,000 mg, Intravenous, Q24H  chlorhexidine, 15 mL, Mouth/Throat, Q12H  empagliflozin, 10 mg, Oral, Daily  escitalopram, 20 mg, Nasogastric, Daily  heparin, 60 Units/kg, Intravenous, Once  insulin glargine, 10 Units, Subcutaneous, Nightly  insulin lispro, 4-24 Units, Subcutaneous, Q6H  lansoprazole, 30 mg, Nasogastric, Q AM  [Held by provider] lisinopril, 10 mg, Oral, Daily  [Held by provider] metoprolol succinate XL, 25 mg, Oral, Daily  mupirocin, 1 Application, Each Nare, BID  polyethylene glycol, 17 g, Oral, Daily  predniSONE, 40 mg, Oral, Daily With Breakfast  sodium chloride, 10 mL, Intravenous, Q12H  ticagrelor, 90 mg, Nasogastric, BID        Continuous Infusions:   amiodarone, 0.5 mg/min, Last Rate: 0.5 mg/min (02/06/25 0130)  heparin, 12 Units/kg/hr          Plan discussed with RN. Reviewed all other data in the last 24 hours, including but not limited to vitals, labs, microbiology, imaging and pertinent notes from other providers.  Plan also discussed with patient's wife at  the bedside.    Total critical care time: Approximately 34 minutes    Due to a high probability of clinically significant, life threatening deterioration, the patient required my highest level of preparedness to intervene emergently and I personally spent this critical care time directly and personally managing the patient. This critical care time included obtaining a history; examining the patient; pulse oximetry; ordering and review of studies; arranging urgent treatment with development of a management plan; evaluation of patient's response to treatment; frequent reassessment; and, discussions with other providers.    This critical care time was performed to assess and manage the high probability of imminent, life-threatening deterioration that could result in multi-organ failure. It was exclusive of separately billable procedures and treating other patients and teaching time.    Dante Truong MD   Critical Care  02/06/25   09:01 EST

## 2025-02-06 NOTE — PROGRESS NOTES
Nutrition Services  Patient Name: Chu Peralta  YOB: 1965  MRN: [id number]  Admission date: 2/3/2025    PROGRESS NOTE      Nutrition Intervention Updates: Continue TF at initial rate due to turning off and restarting at this time.    Will continue to monitor for ability to advance tomorrow.        Encounter Information: Check on for TF.  Patient discussed in AM rounds.  Extubated yesterday. NGT replaced yesterday and in place now. Propofol discontinued. No pressors currently. Episode of tachycardia and dyspneic last night. Pt placed on 10 L HFNC but unable to tolerate so on BiPAP now. Plans to try airvo today. TF was held r/t plans for decreased oxygen demands but resuming now since pt did not tolerate HFNC.        PO Diet: NPO Diet NPO Type: Strict NPO   PO Supplements: None ordered   PO Intake:  NPO       Current nutrition support: Peptamen Intense VHP at 20 mL/hour + 20 mL/hour water    Nutrition support review: TF not infusing during rounds - ordered to restart now       Labs (reviewed below): Hypermagnesemia - management per attending.   Hypophosphatemia - resolved currently       GI Function:  No BM since admission (x 3 days ago) - PRN bowel regimen available        Brief weight review   Wt Readings from Last 10 Encounters:   02/06/25 0600 75.3 kg (166 lb 0.1 oz)   02/05/25 0541 78.1 kg (172 lb 2.9 oz)   02/04/25 0600 75.5 kg (166 lb 7.2 oz)   02/03/25 1831 75.8 kg (167 lb)   02/03/25 1700 76 kg (167 lb 8.8 oz)   02/03/25 1651 76 kg (167 lb 8.8 oz)        Results from last 7 days   Lab Units 02/06/25  0310 02/05/25  2144 02/05/25  1836 02/05/25  0415 02/04/25  0518   SODIUM mmol/L 139 139  --  138 139   POTASSIUM mmol/L 4.6 4.7  --  3.8 4.7   CHLORIDE mmol/L 103 103  --  106 105   CO2 mmol/L 28.0 28.6  --  24.0 21.1*   BUN mg/dL 24* 21  --  18 20   CREATININE mg/dL 0.62* 0.64* 0.82 0.50* 1.02   CALCIUM mg/dL 8.5* 8.6  --  8.7 8.7   BILIRUBIN mg/dL <0.2  --   --  <0.2 <0.2   ALK PHOS U/L 51  --    --  48 58   ALT (SGPT) U/L 50*  --   --  56* 49*   AST (SGOT) U/L 124*  --   --  169* 183*   GLUCOSE mg/dL 96 115*  --  153* 209*     Results from last 7 days   Lab Units 02/06/25  0310 02/05/25  2144 02/05/25  1836 02/05/25  0415 02/03/25  2035 02/03/25  1716   MAGNESIUM mg/dL 2.7* 2.9*  --  2.3   < > 2.3   PHOSPHORUS mg/dL 3.0  --   --  1.4*   < > 5.1*   HEMOGLOBIN g/dL 10.4*  --   --  10.5*   < > 12.6*   HEMOGLOBIN, POC   --   --    < >  --    < >  --    HEMATOCRIT % 33.6*  --   --  32.5*   < > 40.6   HEMATOCRIT POC   --   --    < >  --    < >  --    TRIGLYCERIDES mg/dL  --   --   --   --   --  49    < > = values in this interval not displayed.       RD to follow up per protocol.    Electronically signed by:  Leonie Ramírez RD  02/06/25 08:29 EST

## 2025-02-06 NOTE — PROGRESS NOTES
Cardiology Progress  Note      Patient Care Team:  Deanne Perrin APRN as PCP - General (Nurse Practitioner)  Jm Gaona MD as Consulting Physician (Pulmonary Disease)    PATIENT IDENTIFICATION  Name: Chu Peralta  Age: 59 y.o.  Sex: male  :  1965  MRN: [id number]      Length of stay:    LOS: 3 days           REASON FOR FOLLOW-UP:  Acute inferior wall ST elevation MI  Severe single vessel coronary artery disease  Severe LV dysfunction        INTERVAL HISTORY  Patient seen and examined, chart and labs reviewed.  Patient has been extubated since I last evaluated him and is on BiPAP.  He is alert and seems oriented, follows commands.  Rhythm atrial fibrillation at 119.  He is currently on IV amiodarone drip.  Discussed with attending nurse-low-grade temp overnight, plans to transition to Airvo today.  No other drips.    SUBJECTIVE    A complete review of systems was obtained with pertinent findings listed in interval history.  All other systems are negative.      REVIEW OF SYSTEMS:  Review of Systems   Constitutional: Positive for malaise/fatigue.   All other systems reviewed and are negative.       OBJECTIVE   Hemoglobin 10.4/hematocrit 33.6      ASSESSMENT  Acute inferior wall ST elevation MI status post PCI-RCA  Severe global LV systolic dysfunction/ischemic dilated cardiomyopathy  Acute respiratory failure requiring endotracheal intubation  Acute on chronic COPD exacerbation  Influenza A infection  History of chronic hypoxic respiratory failure on home O2  Hypotension requiring vasopressor support  Peripheral arterial disease-severe iliofemoral disease bilaterally  Heart failure with reduced ejection fraction  Shock-distributive/cardiovascular      RECOMMENDATIONS  Patient has been extubated, on BiPAP with plans to transition to Airvo today.  Continue uninterrupted dual antiplatelet therapy consisting of aspirin and Brilinta.  Unable to initiate goal-directed medical therapy due to hypotension.  No  "longer requiring vasopressor support.  Continue statin.  Monitor and replete electrolytes per protocol.  Continue close monitoring.          CHF Guideline Directed Medical Therapy  Beta Blocker:   ARNI/ACE/ARB:   SGLT 2 inhibitors:   Diuretics:   Aldosterone Antagonist:   Vasodilators & Nitrates:       Vital Signs  Visit Vitals  BP 98/71   Pulse 119   Temp 100.4 °F (38 °C)   Resp 23   Ht 175.3 cm (69\")   Wt 75.3 kg (166 lb 0.1 oz)   SpO2 99%   BMI 24.51 kg/m²     Oxygen Therapy  SpO2: 99 %  Pulse Oximetry Type: Continuous  Device (Oxygen Therapy): NPPV/NIV  Flow (L/min) (Oxygen Therapy): 15  Oxygen Concentration (%): 35  Flowsheet Rows      Flowsheet Row First Filed Value   Admission Height 172.7 cm (68\") Documented at 02/03/2025 1651   Admission Weight 76 kg (167 lb 8.8 oz) Documented at 02/03/2025 1651          Intake & Output (last 3 days)         02/01 0701 02/02 0700 02/02 0701  02/03 0700 02/03 0701  02/04 0700 02/04 0701  02/05 0700    P.O.   0     I.V. (mL/kg)   1295 (17.2)     Total Intake(mL/kg)   1295 (17.2)     Urine (mL/kg/hr)   900     Emesis/NG output   0     Stool   0     Total Output   900     Net   +395             Urine Unmeasured Occurrence   0 x     Stool Unmeasured Occurrence   0 x     Emesis Unmeasured Occurrence   0 x           Lines, Drains & Airways       Active LDAs       Name Placement date Placement time Site Days    CVC Triple Lumen 02/04/25 Right Internal jugular 02/04/25  0400  Internal jugular  less than 1    Peripheral IV 02/03/25 1652 Distal;Left;Posterior Forearm 02/03/25  1652  Forearm  less than 1    Peripheral IV 02/03/25 1655 Anterior;Distal;Right;Upper Arm 02/03/25  1655  Arm  less than 1    NG/OG Tube Nasogastric 16 Fr Left nostril 02/03/25  1734  Left nostril  less than 1    Urethral Catheter 02/04/25  0500  -- less than 1    Hi-Lo Evac ETT 8 02/03/25  --  Oral  1    Arterial Line 02/04/25 Right Radial 02/04/25  0400  Radial  less than 1    Arterial Sheath 6 Fr. Right " "Femoral 02/03/25 1904  Femoral  less than 1    Venous Sheath 6 Fr. Right Femoral 02/03/25 1903  Femoral  less than 1                           BP 98/71   Pulse 119   Temp 100.4 °F (38 °C)   Resp 23   Ht 175.3 cm (69\")   Wt 75.3 kg (166 lb 0.1 oz)   SpO2 99%   BMI 24.51 kg/m²   Intake/Output last 3 shifts:  I/O last 3 completed shifts:  In: 4266 [P.O.:30; I.V.:3409; Other:342; NG/GT:485]  Out: 3215 [Urine:3175; Emesis/NG output:40]  Intake/Output this shift:  I/O this shift:  In: 50 [NG/GT:50]  Out: 50 [Urine:50]    PHYSICAL EXAM:    General: Well-developed, well-nourished, critically ill 59-year-old male who is awake, following commands, cooperative  Head:  Normocephalic, atraumatic, mucous membranes moist, endotracheal tube noted  Eyes:  Conjunctivae/corneas clear     Neck:  Supple,  no adenopathy; no JVD or bruit  Lungs: Coarse in bases  Chest wall: No tenderness  Heart::  Irregularly irregular rate and rhythm, tachycardic, S1 and S2 normal, no murmur, rub or gallop  Abdomen: Soft, nondistended, bowel sounds active  Extremities: No cyanosis, clubbing, or edema   Pulses: 2+ and symmetric all extremities  Skin:  No rashes or lesions  Neuro/psych: Alert and oriented.  Cranial nerves II through XII are grossly intact        Scheduled Meds:      aspirin, 81 mg, Nasogastric, Daily  atorvastatin, 40 mg, Nasogastric, Nightly  budesonide, 0.5 mg, Nebulization, BID - RT  cefTRIAXone, 2,000 mg, Intravenous, Q24H  chlorhexidine, 15 mL, Mouth/Throat, Q12H  empagliflozin, 10 mg, Oral, Daily  escitalopram, 20 mg, Nasogastric, Daily  insulin glargine, 10 Units, Subcutaneous, Nightly  insulin lispro, 4-24 Units, Subcutaneous, Q6H  lansoprazole, 30 mg, Nasogastric, Q AM  [Held by provider] lisinopril, 10 mg, Oral, Daily  [START ON 2/7/2025] methylPREDNISolone sodium succinate, 40 mg, Intravenous, Daily  [Held by provider] metoprolol succinate XL, 25 mg, Oral, Daily  midodrine, 5 mg, Oral, TID AC  mupirocin, 1 Application, " Each Nare, BID  polyethylene glycol, 17 g, Oral, Daily  sodium chloride, 10 mL, Intravenous, Q12H  ticagrelor, 90 mg, Nasogastric, BID        Continuous Infusions:    amiodarone, 0.5 mg/min, Last Rate: 0.5 mg/min (02/06/25 1151)  heparin, 12 Units/kg/hr, Last Rate: 12 Units/kg/hr (02/06/25 1038)          PRN Meds:      acetaminophen    aluminum-magnesium hydroxide-simethicone    senna-docusate sodium **AND** polyethylene glycol **AND** bisacodyl **AND** bisacodyl    dextrose    dextrose    diphenhydrAMINE    glucagon (human recombinant)    heparin    heparin    ipratropium-albuterol    nicotine    nitroglycerin    ondansetron ODT **OR** ondansetron    sodium chloride    sodium chloride        Results Review:     I reviewed the patient's new clinical results.    CBC    Results from last 7 days   Lab Units 02/06/25  0310 02/05/25  1836 02/05/25 0415 02/04/25 0518 02/03/25 2035 02/03/25 1716 02/03/25  1710   WBC 10*3/mm3 12.39*  --  18.99* 18.70*  --  25.64*  --    HEMOGLOBIN g/dL 10.4*  --  10.5* 12.0*  --  12.6*  --    HEMOGLOBIN, POC g/dL  --  13.3  --   --  13.2  --  13.9   PLATELETS 10*3/mm3 239  --  283 330  --  336  --      Cr Clearance Estimated Creatinine Clearance: 133.3 mL/min (A) (by C-G formula based on SCr of 0.62 mg/dL (L)).  Coag   Results from last 7 days   Lab Units 02/06/25  0938 02/06/25  0310 02/05/25 0415 02/04/25 0518 02/03/25 1716   INR  1.05  --   --   --  1.21*   APTT seconds 35.2 38.8* 37.2* 32.9 >200.0*     HbA1C   Lab Results   Component Value Date    HGBA1C 6.06 (H) 02/03/2025     Blood Glucose   Glucose   Date/Time Value Ref Range Status   02/06/2025 1148 96 70 - 105 mg/dL Final     Comment:     Serial Number: [id number]:  [id number]   02/06/2025 0546 106 (H) 70 - 105 mg/dL Final     Comment:     Serial Number: [id number]:  [id number]   02/05/2025 2321 99 70 - 105 mg/dL Final     Comment:     Serial Number: [id number]:  [id number]   02/05/2025 2000 151 (H) 74 -  100 mg/dL Final     Comment:     Serial Number: [id number]:  [id number]   02/05/2025 1836 246 (H) 74 - 100 mg/dL Final     Comment:     Serial Number: [id number]:  [id number]   02/05/2025 1836 246 (H) 74 - 100 mg/dL Final   02/05/2025 1829 212 (H) 70 - 105 mg/dL Final     Comment:     Serial Number: [id number]:  [id number]   02/05/2025 1430 113 (H) 70 - 105 mg/dL Final     Comment:     Serial Number: [id number]:  [id number]     Infection   Results from last 7 days   Lab Units 02/03/25  1806 02/03/25  1716   BLOODCX  No growth at 2 days No growth at 2 days   PROCALCITONIN ng/mL  --  37.40*     CMP   Results from last 7 days   Lab Units 02/06/25  0310 02/05/25  2144 02/05/25  1836 02/05/25  0415 02/04/25  0518 02/03/25  2320 02/03/25  2035 02/03/25  1716   SODIUM mmol/L 139 139  --  138 139  --   --  135*   POTASSIUM mmol/L 4.6 4.7  --  3.8 4.7 5.7*  --  5.1   CHLORIDE mmol/L 103 103  --  106 105  --   --  101   CO2 mmol/L 28.0 28.6  --  24.0 21.1*  --   --  25.4   BUN mg/dL 24* 21  --  18 20  --   --  11   CREATININE mg/dL 0.62* 0.64* 0.82 0.50* 1.02  --  1.19 0.91   GLUCOSE mg/dL 96 115*  --  153* 209*  --   --  186*   ALBUMIN g/dL 3.0*  --   --  3.1* 3.8  --   --  4.0   BILIRUBIN mg/dL <0.2  --   --  <0.2 <0.2  --   --  0.2   ALK PHOS U/L 51  --   --  48 58  --   --  66   AST (SGOT) U/L 124*  --   --  169* 183*  --   --  84*   ALT (SGPT) U/L 50*  --   --  56* 49*  --   --  30   AMYLASE U/L  --   --   --   --   --   --   --  64   LIPASE U/L  --   --   --   --   --   --   --  20     ABG    Results from last 7 days   Lab Units 02/05/25 2000 02/05/25  1836 02/05/25  0336 02/04/25  0733 02/04/25  0250 02/03/25  2355 02/03/25  5468   PH, ARTERIAL pH units 7.360 7.180* 7.478* 7.358 7.295* 7.160* 7.123*   PCO2, ARTERIAL mm Hg 56.8* 74.7* 37.2 42.9 49.1* 65.0* 78.0*   PO2 ART mm Hg 106.8 201.2* 120.0* 180.0* 97.0 143.1* 74.5*   O2 SATURATION ART % 97.7 99.4* 99.0* 99.5* 96.6 98.3* 88.0*   BASE EXCESS ART mmol/L  "5.1* -2.2* 3.9* -1.4* -3.1* -6.5* -5.7*     UA    Results from last 7 days   Lab Units 02/03/25  1730   NITRITE UA  Negative   WBC UA /HPF 0-2   BACTERIA UA /HPF None Seen   SQUAM EPITHEL UA /HPF None Seen     LILIANE  No results found for: \"POCMETH\", \"POCAMPHET\", \"POCBARBITUR\", \"POCBENZO\", \"POCCOCAINE\", \"POCOPIATES\", \"POCOXYCODO\", \"POCPHENCYC\", \"POCPROPOXY\", \"POCTHC\", \"POCTRICYC\"  Lysis Labs   Results from last 7 days   Lab Units 02/06/25  0938 02/06/25  0310 02/05/25  2144 02/05/25  1836 02/05/25  0415 02/04/25  0518 02/03/25  2035 02/03/25  1716 02/03/25  1710   INR  1.05  --   --   --   --   --   --  1.21*  --    APTT seconds 35.2 38.8*  --   --  37.2* 32.9  --  >200.0*  --    HEMOGLOBIN g/dL  --  10.4*  --   --  10.5* 12.0*  --  12.6*  --    HEMOGLOBIN, POC g/dL  --   --   --  13.3  --   --  13.2  --  13.9   PLATELETS 10*3/mm3  --  239  --   --  283 330  --  336  --    CREATININE mg/dL  --  0.62* 0.64* 0.82 0.50* 1.02 1.19 0.91 0.89     Radiology(recent) XR Chest 1 View    Result Date: 2/6/2025  Impression: No significant interval change. Electronically Signed: Alvaro Loera MD  2/6/2025 7:58 AM EST  Workstation ID: [id number]    XR Chest 1 View    Result Date: 2/5/2025  Impression: 1.Interval extubation. 2.Persistent diffuse interstitial opacities with perihilar and left midlung airspace opacities. Electronically Signed: Alvaro Loera MD  2/5/2025 7:08 PM EST  Workstation ID: [id number]    XR Chest 1 View    Result Date: 2/5/2025  Impression: 1. Lines and support tubes in expected location. 2. No pneumothorax. 3. Clear lungs. Electronically Signed: Celso Cox MD  2/5/2025 7:16 AM EST  Workstation ID: [id number]       Results from last 7 days   Lab Units 02/05/25 2000 02/04/25 0518 02/03/25  1716   CK TOTAL U/L  --   --  1,076*   HSTROP T ng/L 1,957*   < > 1,288*    < > = values in this interval not displayed.       X-rays, labs reviewed personally by physician.    ECG/EMG Results (most recent)       Procedure " Component Value Units Date/Time    ECG 12 Lead Other; elevated troponin [[id number]] Collected: 02/03/25 1739     Updated: 02/03/25 1740     QT Interval 317 ms      QTC Interval 440 ms     Narrative:      HEART YHQP=856  bpm  RR Gaegfabh=386  ms  SC Pzdhndkn=414  ms  P Horizontal Axis=6  deg  P Front Axis=74  deg  QRSD Interval=84  ms  QT Svxgzyxp=610  ms  UIyY=839  ms  QRS Axis=69  deg  T Wave Axis=60  deg  - ABNORMAL ECG -  Sinus tachycardia  Ventricular premature complex  Low voltage, precordial leads  Probable anteroseptal infarct, old  ST elevation, consider inferior injury  Date and Time of Study:2025-02-03 17:39:35    Adult Transthoracic Echo Complete w/ Color, Spectral and Contrast if Necessary Per Protocol [[id number]] Resulted: 02/03/25 2054     Updated: 02/03/25 2054     LVIDd 4.7 cm      LVIDs 4.1 cm      IVSd 1.00 cm      LVPWd 1.00 cm      FS 12.8 %      IVS/LVPW 1.00 cm      ESV(cubed) 68.9 ml      EDV(cubed) 103.8 ml      LV mass(C)d 164.5 grams      LVOT area 2.8 cm2      LVOT diam 1.90 cm      EDV(MOD-sp4) 124.0 ml      ESV(MOD-sp4) 90.1 ml      SV(MOD-sp4) 33.9 ml      EF(MOD-sp4) 27.3 %      MV E max trevon 42.3 cm/sec      MV A max trevon 62.0 cm/sec      MV dec time 0.14 sec      MV E/A 0.68     Med Peak E' Trevon 5.3 cm/sec      Lat Peak E' Trevon 5.2 cm/sec      TR max trevon 261.0 cm/sec      Avg E/e' ratio 8.06     SV(LVOT) 24.0 ml      TAPSE (>1.6) 1.56 cm      RV S' 12.3 cm/sec      LA dimension (2D)  3.3 cm      LV V1 max 57.9 cm/sec      LV V1 max PG 1.34 mmHg      LV V1 mean PG 1.00 mmHg      LV V1 VTI 8.5 cm      Ao pk trevon 88.2 cm/sec      Ao max PG 3.1 mmHg      Ao mean PG 2.00 mmHg      Ao V2 VTI 13.7 cm      EFREM(I,D) 1.75 cm2      AI P1/2t 374.7 msec      MV max PG 1.54 mmHg      MV mean PG 1.00 mmHg      MV V2 VTI 8.2 cm      MV P1/2t 16.1 msec      MVA(P1/2t) 13.7 cm2      MVA(VTI) 2.9 cm2      MV dec slope 1,020 cm/sec2      MR max trevon 215.0 cm/sec      MR max PG 18.5 mmHg      TR max PG 27.2  mmHg      RVSP(TR) 30.2 mmHg      RAP systole 3.0 mmHg      RV V1 max PG 0.58 mmHg      RV V1 max 38.1 cm/sec      RV V1 VTI 4.6 cm      PA V2 max 57.6 cm/sec      PA acc time 0.12 sec      ACS 2.30 cm      Sinus 3.7 cm      EF(MOD-bp) 27.0 %      Dimensionless Index 0.72 (DI)     Narrative:        Left ventricular systolic function is severely decreased. Left   ventricular ejection fraction appears to be 26 - 30%.    Left ventricular diastolic function is consistent with (grade I)   impaired relaxation.    Moderately reduced right ventricular systolic function noted.    The right ventricular cavity is mildly dilated.    Estimated right ventricular systolic pressure from tricuspid   regurgitation is normal (<35 mmHg). Calculated right ventricular systolic   pressure from tricuspid regurgitation is 30 mmHg.      ECG 12 Lead Pre-Op / Pre-Procedure [[id number]] Collected: 02/04/25 0526     Updated: 02/04/25 0528     QT Interval 312 ms      QTC Interval 425 ms     Narrative:      HEART TIYT=027  bpm  RR Wvhmstci=507  ms  ID Lovoqtdv=846  ms  P Horizontal Axis=-16  deg  P Front Axis=82  deg  QRSD Interval=81  ms  QT Xigkwnds=115  ms  SIgW=046  ms  QRS Axis=76  deg  T Wave Axis=72  deg  - ABNORMAL ECG -  Sinus tachycardia  Probable inferior infarct, recent  Consider anterior infarct  Date and Time of Study:2025-02-04 05:26:28    Telemetry Scan [[id number]] Resulted: 02/03/25     Updated: 02/04/25 0912    Telemetry Scan [[id number]] Resulted: 02/03/25     Updated: 02/04/25 0942    Telemetry Scan [[id number]] Resulted: 02/03/25     Updated: 02/04/25 1117    Telemetry Scan [[id number]] Resulted: 02/03/25     Updated: 02/04/25 1229    Telemetry Scan [[id number]] Resulted: 02/03/25     Updated: 02/04/25 1429    Telemetry Scan [[id number]] Resulted: 02/03/25     Updated: 02/04/25 1631    Telemetry Scan [[id number]] Resulted: 02/03/25     Updated: 02/05/25 0829    Telemetry Scan [[id number]] Resulted: 02/03/25     Updated: 02/05/25 0837     Telemetry Scan [[id number]] Resulted: 02/03/25     Updated: 02/05/25 0850    Telemetry Scan [[id number]] Resulted: 02/03/25     Updated: 02/05/25 0856    Telemetry Scan [[id number]] Resulted: 02/03/25     Updated: 02/05/25 1247    Telemetry Scan [[id number]] Resulted: 02/03/25     Updated: 02/05/25 1604    ECG 12 Lead Chest Pain [[id number]] Collected: 02/05/25 1818     Updated: 02/05/25 1819     QT Interval 309 ms      QTC Interval 460 ms     Narrative:      HEART MRCW=623  bpm  RR Cgngwxus=960  ms  NJ Interval=45  ms  P Horizontal Axis=-42  deg  P Front Axis=0  deg  QRSD Wdzaetiw=290  ms  QT Ilrojszv=566  ms  GKsU=334  ms  QRS Axis=92  deg  T Wave Axis=-74  deg  - ABNORMAL ECG -  Sinus tachycardia  Multiform ventricular premature complexes  Nonspecific intraventricular conduction delay  ST elevation, consider lateral injury  Date and Time of Study:2025-02-05 18:18:27    ECG 12 Lead Drug Monitoring; Amiodarone [[id number]] Collected: 02/05/25 1936     Updated: 02/05/25 1939     QT Interval 352 ms      QTC Interval 530 ms     Narrative:      HEART SEWP=262  bpm  RR Frgahyyq=738  ms  NJ Interval=  ms  P Horizontal Axis=  deg  P Front Axis=  deg  QRSD Edpxpuxk=799  ms  QT Lcxzviyv=736  ms  OAwA=480  ms  QRS Axis=98  deg  T Wave Axis=-70  deg  - ABNORMAL ECG -  Atrial fibrillation  Nonspecific intraventricular conduction delay  Low voltage, precordial leads  Nonspecific T abnormalities, inferior leads  Prolonged QT interval  Date and Time of Study:2025-02-05 19:36:58    ECG 12 Lead QT Measurement [[id number]] Collected: 02/05/25 2328     Updated: 02/05/25 2329     QT Interval 359 ms      QTC Interval 515 ms     Narrative:      HEART GLKL=921  bpm  RR Nxefwjgt=813  ms  NJ Interval=  ms  P Horizontal Axis=  deg  P Front Axis=  deg  QRSD Zcdnvcle=871  ms  QT Qbtgiged=564  ms  IRkZ=432  ms  QRS Axis=93  deg  T Wave Axis=-77  deg  - ABNORMAL ECG -  Atrial fibrillation  RBBB and LPFB  Prolonged QT interval  Date and Time of  Study:2025-02-05 23:28:11    Telemetry Scan [[id number]] Resulted: 02/03/25     Updated: 02/06/25 0145    Telemetry Scan [[id number]] Resulted: 02/03/25     Updated: 02/06/25 0427    ECG 12 Lead Drug Monitoring; Amiodarone [[id number]] Collected: 02/06/25 0519     Updated: 02/06/25 0519     QT Interval 369 ms      QTC Interval 502 ms     Narrative:      HEART KAWT=429  bpm  RR Itrsoaur=255  ms  LA Interval=  ms  P Horizontal Axis=  deg  P Front Axis=  deg  QRSD Rheylqix=655  ms  QT Fhgcbnkx=313  ms  GOeL=660  ms  QRS Axis=93  deg  T Wave Axis=-35  deg  - ABNORMAL ECG -  Atrial fibrillation  RBBB and LPFB  Prolonged QT interval  Date and Time of Study:2025-02-06 05:19:06    Telemetry Scan [[id number]] Resulted: 02/03/25     Updated: 02/06/25 0712    Telemetry Scan [[id number]] Resulted: 02/03/25     Updated: 02/06/25 0755    ECG 12 Lead Rhythm Change [[id number]] Collected: 02/05/25 1820     Updated: 02/06/25 0758     QT Interval 302 ms      QTC Interval 470 ms     Narrative:      HEART JUFD=354  bpm  RR Blvfgjug=086  ms  LA Loyozerl=753  ms  P Horizontal Axis=-24  deg  P Front Axis=Invalid  deg  QRSD Jzlpbmfh=109  ms  QT Spldvjaf=809  ms  TKoG=755  ms  QRS Axis=93  deg  T Wave Axis=-75  deg  - ABNORMAL ECG -  Serial comparison ignored immediately previous ECG(s) - Too recent  Sinus tachycardia  Ventricular premature complex  Lateral  infarct, age indeterminate  Abnormal T, consider ischemia, inferior leads  When compared with ECG of 04-Feb-2025 05:26:28,  Significant rate increase  New or worsened ischemia or infarction  Date and Time of Study:2025-02-05 18:20:06    Telemetry Scan [[id number]] Resulted: 02/03/25     Updated: 02/06/25 0915              Medication Review:   I have reviewed the patient's current medication list  Scheduled Meds:aspirin, 81 mg, Nasogastric, Daily  atorvastatin, 40 mg, Nasogastric, Nightly  budesonide, 0.5 mg, Nebulization, BID - RT  cefTRIAXone, 2,000 mg, Intravenous, Q24H  chlorhexidine, 15  mL, Mouth/Throat, Q12H  empagliflozin, 10 mg, Oral, Daily  escitalopram, 20 mg, Nasogastric, Daily  insulin glargine, 10 Units, Subcutaneous, Nightly  insulin lispro, 4-24 Units, Subcutaneous, Q6H  lansoprazole, 30 mg, Nasogastric, Q AM  [Held by provider] lisinopril, 10 mg, Oral, Daily  [START ON 2/7/2025] methylPREDNISolone sodium succinate, 40 mg, Intravenous, Daily  [Held by provider] metoprolol succinate XL, 25 mg, Oral, Daily  midodrine, 5 mg, Oral, TID AC  mupirocin, 1 Application, Each Nare, BID  polyethylene glycol, 17 g, Oral, Daily  sodium chloride, 10 mL, Intravenous, Q12H  ticagrelor, 90 mg, Nasogastric, BID      Continuous Infusions:amiodarone, 0.5 mg/min, Last Rate: 0.5 mg/min (02/06/25 1151)  heparin, 12 Units/kg/hr, Last Rate: 12 Units/kg/hr (02/06/25 1038)        PRN Meds:.  acetaminophen    aluminum-magnesium hydroxide-simethicone    senna-docusate sodium **AND** polyethylene glycol **AND** bisacodyl **AND** bisacodyl    dextrose    dextrose    diphenhydrAMINE    glucagon (human recombinant)    heparin    heparin    ipratropium-albuterol    nicotine    nitroglycerin    ondansetron ODT **OR** ondansetron    sodium chloride    sodium chloride    Imaging:  Imaging Results (Last 72 Hours)       Procedure Component Value Units Date/Time    XR Chest 1 View [[id number]] Collected: 02/06/25 0757     Updated: 02/06/25 0800    Narrative:      XR CHEST 1 VW    Date of Exam: 2/6/2025 5:49 AM EST    Indication: acute on chronic hypoxic respiratory failure    Comparison: 2/5/2025    Findings:  Enteric tube and right approach central venous catheter in unchanged position. Unchanged cardiomediastinal silhouette. Persistent diffuse interstitial opacities with perihilar and left midlung airspace opacities. No new focal airspace consolidation. No   pleural effusion or pneumothorax. No acute bone abnormality.      Impression:      Impression:  No significant interval change.      Electronically Signed: Alvaro Loera MD     2/6/2025 7:58 AM EST    Workstation ID: [id number]    XR Chest 1 View [[id number]] Collected: 02/05/25 1906     Updated: 02/05/25 1910    Narrative:      XR CHEST 1 VW    Date of Exam: 2/5/2025 6:45 PM EST    Indication: Shortness of breath    Comparison: Same day radiographs    Findings:  Interval extubation. Enteric tube distal tip is excluded by collimation. Unchanged right approach central venous catheter. Unchanged cardiomediastinal silhouette. There are persistent diffuse interstitial opacities with perihilar and left midlung   airspace opacities. No evidence of pleural effusion. No pneumothorax. Osseous structures are unchanged.      Impression:      Impression:  1.Interval extubation.  2.Persistent diffuse interstitial opacities with perihilar and left midlung airspace opacities.        Electronically Signed: Alvaro Loera MD    2/5/2025 7:08 PM EST    Workstation ID: [id number]    XR Chest 1 View [[id number]] Collected: 02/05/25 0715     Updated: 02/05/25 0718    Narrative:      XR CHEST 1 VW    Date of Exam: 2/5/2025 4:45 AM EST    Indication: acute on chronic hypoxic respiratory failure    Comparison: 2/4/2025    Findings:  ET tube tip above the jose guadalupe. Esophagogastric tube tip below the diaphragm. Right IJ central venous catheter tip is at the low SVC. The lungs are without consolidation. There is no pneumothorax or pleural effusion.      Impression:      Impression:  1. Lines and support tubes in expected location.  2. No pneumothorax.  3. Clear lungs.          Electronically Signed: Celso Cox MD    2/5/2025 7:16 AM EST    Workstation ID: [id number]    XR Chest 1 View [[id number]] Collected: 02/04/25 0510     Updated: 02/04/25 0514    Narrative:      XR CHEST 1 VW    Date of Exam: 2/4/2025 4:50 AM EST    Indication: line placement, resp failure (no need to repeat AM cxr for resp failure)    Comparison: 2/3/2025.    Findings:  Stable endotracheal tube. Gastric suction tube courses below the diaphragm and  terminates in the stomach with the sideport just below the gastroesophageal junction. There is a new right internal jugular central venous catheter terminating in the mid SVC.   The lungs are clear. No pneumothorax or pleural effusion. Heart size and pulmonary vasculature appear within normal limits. No acute osseous abnormalities.      Impression:      Impression:  1.New right internal jugular central venous catheter terminates in the mid SVC.  2.Gastric suction tube terminates in the stomach with the sideport just below the gastroesophageal junction.  3.Stable endotracheal tube.  4.No acute cardiopulmonary abnormality.          Electronically Signed: Alvaro Poe MD    2/4/2025 5:11 AM EST    Workstation ID: [id number]    XR Abdomen KUB [[id number]] Collected: 02/04/25 0151     Updated: 02/04/25 0154    Narrative:      XR ABDOMEN KUB    Date of Exam: 2/4/2025 1:48 AM EST    Indication: NG placement    Comparison: Correlation with CT PE study 2/3/2025.    Findings:  Gastric suction tube terminates in the proximal stomach. The sideport is just below the GE junction. No distended bowel in the upper abdomen.      Impression:      Impression:  Gastric suction tube terminates in the proximal stomach.          Electronically Signed: Alvaro Poe MD    2/4/2025 1:52 AM EST    Workstation ID: [id number]    XR Chest 1 View [[id number]] Collected: 02/03/25 1748     Updated: 02/03/25 1752    Narrative:      XR CHEST 1 VW    Date of Exam: 2/3/2025 5:19 PM EST    Indication: Acute on chronic respiratory failure, assess endotracheal tube position.    Comparison: 2/3/2025 performed at [facility].    Findings:  The tip of the endotracheal tube is in good position terminating at the level of the aortic knob. The heart size is normal. There is underlying emphysema. The pulmonary vascular markings are normal. The lungs and pleural spaces are clear. There are mild   chronic age-related changes involving the bony thorax.    "   Impression:      Impression:  1.The tip of the endotracheal tube is in good position terminating at the level of the aortic knob.  2.Emphysema.        Electronically Signed: Perez Veronica MD    2/3/2025 5:50 PM EST    Workstation ID: [id number]              ROLA Hendricks  02/06/25  13:12 EST       EMR Dragon/Transcription:   \"Dictated utilizing Dragon dictation\".       Electronically signed by ROLA Hendricks, 02/06/25, 1:13 PM EST.      Copied text in this note has been reviewed by me and is accurate as of 02/06/25.    Cardiology attending:  Seen and examined.  Chart and labs reviewed. Independent interpretations of cardiac testing was performed. History and exam findings are verified with above changes noted.  Assessment and plan notated by APC after being formulated by attending consultant.  Note that greater than 50% of the time spent in care of the patient was provided by attending consultant.    Patient denies any chest pain pressure heaviness or tightness.  Family at bedside.  He reports feeling good.  Attempted Mims catheter but was unable to get placed.  Has some urinary retention.  Will continue to try and void.    Physical Exam:    General: Alert, cooperative, no distress, appears stated age  Head:  Normocephalic, atraumatic, mucous membranes moist  Eyes:  Conjunctivae/corneas clear, EOM's intact     Neck:  Supple,  no bruit  Lungs:           Coarse and diminished bilaterally  Chest wall: No tenderness  Heart::  Regular rate and rhythm, S1 and S2 normal, 1/6 holosystolic murmur.  No rub or gallop  Abdomen: Soft, nontender, nondistended bowel sounds active.  Groin soft no hematoma  Extremities: No cyanosis, clubbing, or edema  Pulses: Diminished pedal pulses  Skin:  No rashes or lesions  Neuro/psych: Alert and appropriate    "

## 2025-02-06 NOTE — CASE MANAGEMENT/SOCIAL WORK
Continued Stay Note   Paresh     Patient Name: Chu Peralta  MRN: [id number]  Today's Date: 2/6/2025    Admit Date: 2/3/2025    Plan: Plan to return home with spouse, pending clinical course.   Discharge Plan       Row Name 02/06/25 1004       Plan    Plan Plan to return home with spouse, pending clinical course.    Plan Comments DC Barriers: BIPAP, NG TF, A-line, V-line, C-line, FC, IV Abxs/steroids, Heparin/Amio gtts, Cardio/WC following.                 Expected Discharge Date and Time       Expected Discharge Date Expected Discharge Time    Feb 10, 2025               MARIJA Alexis RN  ICU/CVU   O: [phone]  C: [phone]  [email or web address]

## 2025-02-06 NOTE — NURSING NOTE
Performed bladder scan, pt has 545mL in the bladder. 2 nurses attempted to straight cath the pt with no success

## 2025-02-07 ENCOUNTER — APPOINTMENT (OUTPATIENT)
Dept: GENERAL RADIOLOGY | Facility: HOSPITAL | Age: 60
DRG: 321 | End: 2025-02-07
Payer: MEDICARE

## 2025-02-07 LAB
ALBUMIN SERPL-MCNC: 2.8 G/DL (ref 3.5–5.2)
ALBUMIN/GLOB SERPL: 1.1 G/DL
ALP SERPL-CCNC: 50 U/L (ref 39–117)
ALT SERPL W P-5'-P-CCNC: 38 U/L (ref 1–41)
ANION GAP SERPL CALCULATED.3IONS-SCNC: 5.4 MMOL/L (ref 5–15)
APTT PPP: 51 SECONDS (ref 22.7–35.4)
APTT PPP: 60.6 SECONDS (ref 22.7–35.4)
AST SERPL-CCNC: 81 U/L (ref 1–40)
BASOPHILS # BLD AUTO: 0.01 10*3/MM3 (ref 0–0.2)
BASOPHILS NFR BLD AUTO: 0.1 % (ref 0–1.5)
BILIRUB SERPL-MCNC: 0.2 MG/DL (ref 0–1.2)
BUN SERPL-MCNC: 27 MG/DL (ref 8–23)
BUN/CREAT SERPL: 49.1 (ref 7–25)
CALCIUM SPEC-SCNC: 8.2 MG/DL (ref 8.6–10.5)
CHLORIDE SERPL-SCNC: 103 MMOL/L (ref 98–107)
CO2 SERPL-SCNC: 30.6 MMOL/L (ref 22–29)
CREAT SERPL-MCNC: 0.55 MG/DL (ref 0.76–1.27)
DEPRECATED RDW RBC AUTO: 51.1 FL (ref 37–54)
EGFRCR SERPLBLD CKD-EPI 2021: 112.8 ML/MIN/1.73
EOSINOPHIL # BLD AUTO: 0 10*3/MM3 (ref 0–0.4)
EOSINOPHIL NFR BLD AUTO: 0 % (ref 0.3–6.2)
ERYTHROCYTE [DISTWIDTH] IN BLOOD BY AUTOMATED COUNT: 15.1 % (ref 12.3–15.4)
GLOBULIN UR ELPH-MCNC: 2.5 GM/DL
GLUCOSE BLDC GLUCOMTR-MCNC: 115 MG/DL (ref 70–105)
GLUCOSE BLDC GLUCOMTR-MCNC: 119 MG/DL (ref 70–105)
GLUCOSE BLDC GLUCOMTR-MCNC: 126 MG/DL (ref 70–105)
GLUCOSE BLDC GLUCOMTR-MCNC: 92 MG/DL (ref 70–105)
GLUCOSE SERPL-MCNC: 82 MG/DL (ref 65–99)
HCT VFR BLD AUTO: 33.5 % (ref 37.5–51)
HGB BLD-MCNC: 10.6 G/DL (ref 13–17.7)
IMM GRANULOCYTES # BLD AUTO: 0.04 10*3/MM3 (ref 0–0.05)
IMM GRANULOCYTES NFR BLD AUTO: 0.5 % (ref 0–0.5)
LYMPHOCYTES # BLD AUTO: 0.78 10*3/MM3 (ref 0.7–3.1)
LYMPHOCYTES NFR BLD AUTO: 9.3 % (ref 19.6–45.3)
MAGNESIUM SERPL-MCNC: 2.4 MG/DL (ref 1.6–2.4)
MCH RBC QN AUTO: 29.3 PG (ref 26.6–33)
MCHC RBC AUTO-ENTMCNC: 31.6 G/DL (ref 31.5–35.7)
MCV RBC AUTO: 92.5 FL (ref 79–97)
MONOCYTES # BLD AUTO: 0.66 10*3/MM3 (ref 0.1–0.9)
MONOCYTES NFR BLD AUTO: 7.9 % (ref 5–12)
NEUTROPHILS NFR BLD AUTO: 6.89 10*3/MM3 (ref 1.7–7)
NEUTROPHILS NFR BLD AUTO: 82.2 % (ref 42.7–76)
NRBC BLD AUTO-RTO: 0 /100 WBC (ref 0–0.2)
PHOSPHATE SERPL-MCNC: 2.7 MG/DL (ref 2.5–4.5)
PLATELET # BLD AUTO: 230 10*3/MM3 (ref 140–450)
PMV BLD AUTO: 9.2 FL (ref 6–12)
POTASSIUM SERPL-SCNC: 4.3 MMOL/L (ref 3.5–5.2)
PROT SERPL-MCNC: 5.3 G/DL (ref 6–8.5)
RBC # BLD AUTO: 3.62 10*6/MM3 (ref 4.14–5.8)
SODIUM SERPL-SCNC: 139 MMOL/L (ref 136–145)
WBC NRBC COR # BLD AUTO: 8.38 10*3/MM3 (ref 3.4–10.8)

## 2025-02-07 PROCEDURE — 94799 UNLISTED PULMONARY SVC/PX: CPT

## 2025-02-07 PROCEDURE — 84100 ASSAY OF PHOSPHORUS: CPT | Performed by: NURSE PRACTITIONER

## 2025-02-07 PROCEDURE — 25010000002 METHYLPREDNISOLONE PER 40 MG: Performed by: EMERGENCY MEDICINE

## 2025-02-07 PROCEDURE — 25010000002 FUROSEMIDE PER 20 MG: Performed by: EMERGENCY MEDICINE

## 2025-02-07 PROCEDURE — 71045 X-RAY EXAM CHEST 1 VIEW: CPT

## 2025-02-07 PROCEDURE — 80053 COMPREHEN METABOLIC PANEL: CPT | Performed by: NURSE PRACTITIONER

## 2025-02-07 PROCEDURE — 99232 SBSQ HOSP IP/OBS MODERATE 35: CPT | Performed by: INTERNAL MEDICINE

## 2025-02-07 PROCEDURE — 82948 REAGENT STRIP/BLOOD GLUCOSE: CPT

## 2025-02-07 PROCEDURE — 93005 ELECTROCARDIOGRAM TRACING: CPT | Performed by: INTERNAL MEDICINE

## 2025-02-07 PROCEDURE — 94660 CPAP INITIATION&MGMT: CPT

## 2025-02-07 PROCEDURE — 85025 COMPLETE CBC W/AUTO DIFF WBC: CPT | Performed by: INTERNAL MEDICINE

## 2025-02-07 PROCEDURE — 25010000002 DIGOXIN PER 500 MCG: Performed by: INTERNAL MEDICINE

## 2025-02-07 PROCEDURE — 97166 OT EVAL MOD COMPLEX 45 MIN: CPT

## 2025-02-07 PROCEDURE — 85730 THROMBOPLASTIN TIME PARTIAL: CPT | Performed by: EMERGENCY MEDICINE

## 2025-02-07 PROCEDURE — 97162 PT EVAL MOD COMPLEX 30 MIN: CPT

## 2025-02-07 PROCEDURE — 83735 ASSAY OF MAGNESIUM: CPT | Performed by: INTERNAL MEDICINE

## 2025-02-07 PROCEDURE — 94761 N-INVAS EAR/PLS OXIMETRY MLT: CPT

## 2025-02-07 PROCEDURE — 25010000002 ENOXAPARIN PER 10 MG: Performed by: EMERGENCY MEDICINE

## 2025-02-07 RX ORDER — METOPROLOL SUCCINATE 25 MG/1
25 TABLET, EXTENDED RELEASE ORAL ONCE
Status: COMPLETED | OUTPATIENT
Start: 2025-02-07 | End: 2025-02-07

## 2025-02-07 RX ORDER — PANTOPRAZOLE SODIUM 40 MG/1
40 TABLET, DELAYED RELEASE ORAL
Status: COMPLETED | OUTPATIENT
Start: 2025-02-08 | End: 2025-02-08

## 2025-02-07 RX ORDER — ESCITALOPRAM OXALATE 10 MG/1
20 TABLET ORAL DAILY
Status: DISCONTINUED | OUTPATIENT
Start: 2025-02-08 | End: 2025-02-10 | Stop reason: HOSPADM

## 2025-02-07 RX ORDER — AMOXICILLIN 250 MG
2 CAPSULE ORAL 2 TIMES DAILY
Status: DISCONTINUED | OUTPATIENT
Start: 2025-02-07 | End: 2025-02-10 | Stop reason: HOSPADM

## 2025-02-07 RX ORDER — ASPIRIN 81 MG/1
81 TABLET ORAL DAILY
Status: DISCONTINUED | OUTPATIENT
Start: 2025-02-08 | End: 2025-02-10 | Stop reason: HOSPADM

## 2025-02-07 RX ORDER — BISACODYL 10 MG
10 SUPPOSITORY, RECTAL RECTAL DAILY PRN
Status: DISCONTINUED | OUTPATIENT
Start: 2025-02-07 | End: 2025-02-10 | Stop reason: HOSPADM

## 2025-02-07 RX ORDER — ENOXAPARIN SODIUM 100 MG/ML
1 INJECTION SUBCUTANEOUS EVERY 12 HOURS SCHEDULED
Status: DISCONTINUED | OUTPATIENT
Start: 2025-02-07 | End: 2025-02-10

## 2025-02-07 RX ORDER — ATORVASTATIN CALCIUM 40 MG/1
40 TABLET, FILM COATED ORAL NIGHTLY
Status: DISCONTINUED | OUTPATIENT
Start: 2025-02-07 | End: 2025-02-10 | Stop reason: HOSPADM

## 2025-02-07 RX ORDER — POLYETHYLENE GLYCOL 3350 17 G/17G
17 POWDER, FOR SOLUTION ORAL DAILY
Status: DISCONTINUED | OUTPATIENT
Start: 2025-02-08 | End: 2025-02-10 | Stop reason: HOSPADM

## 2025-02-07 RX ORDER — FUROSEMIDE 10 MG/ML
40 INJECTION INTRAMUSCULAR; INTRAVENOUS ONCE
Status: COMPLETED | OUTPATIENT
Start: 2025-02-07 | End: 2025-02-07

## 2025-02-07 RX ORDER — BISACODYL 5 MG/1
5 TABLET, DELAYED RELEASE ORAL DAILY PRN
Status: DISCONTINUED | OUTPATIENT
Start: 2025-02-07 | End: 2025-02-10 | Stop reason: HOSPADM

## 2025-02-07 RX ORDER — LISINOPRIL 5 MG/1
5 TABLET ORAL DAILY
Status: DISCONTINUED | OUTPATIENT
Start: 2025-02-08 | End: 2025-02-07

## 2025-02-07 RX ADMIN — Medication 10 ML: at 08:56

## 2025-02-07 RX ADMIN — ATORVASTATIN CALCIUM 40 MG: 40 TABLET, FILM COATED ORAL at 20:44

## 2025-02-07 RX ADMIN — EMPAGLIFLOZIN 10 MG: 10 TABLET, FILM COATED ORAL at 08:14

## 2025-02-07 RX ADMIN — DIGOXIN 250 MCG: 0.25 INJECTION INTRAMUSCULAR; INTRAVENOUS at 08:19

## 2025-02-07 RX ADMIN — ENOXAPARIN SODIUM 80 MG: 100 INJECTION SUBCUTANEOUS at 20:45

## 2025-02-07 RX ADMIN — LANSOPRAZOLE 30 MG: 30 TABLET, ORALLY DISINTEGRATING ORAL at 05:44

## 2025-02-07 RX ADMIN — AMIODARONE HYDROCHLORIDE 200 MG: 200 TABLET ORAL at 08:13

## 2025-02-07 RX ADMIN — ESCITALOPRAM 20 MG: 10 TABLET, FILM COATED ORAL at 08:14

## 2025-02-07 RX ADMIN — BUDESONIDE 0.5 MG: 0.5 INHALANT RESPIRATORY (INHALATION) at 19:50

## 2025-02-07 RX ADMIN — METHYLPREDNISOLONE SODIUM SUCCINATE 40 MG: 40 INJECTION, POWDER, FOR SOLUTION INTRAMUSCULAR; INTRAVENOUS at 08:53

## 2025-02-07 RX ADMIN — ENOXAPARIN SODIUM 80 MG: 100 INJECTION SUBCUTANEOUS at 10:12

## 2025-02-07 RX ADMIN — ASPIRIN 81 MG CHEWABLE TABLET 81 MG: 81 TABLET CHEWABLE at 08:13

## 2025-02-07 RX ADMIN — SENNOSIDES AND DOCUSATE SODIUM 2 TABLET: 50; 8.6 TABLET ORAL at 20:44

## 2025-02-07 RX ADMIN — ALUMINUM HYDROXIDE, MAGNESIUM HYDROXIDE, AND DIMETHICONE 15 ML: 400; 400; 40 SUSPENSION ORAL at 18:39

## 2025-02-07 RX ADMIN — DIGOXIN 250 MCG: 0.25 INJECTION INTRAMUSCULAR; INTRAVENOUS at 01:45

## 2025-02-07 RX ADMIN — AMIODARONE HYDROCHLORIDE 200 MG: 200 TABLET ORAL at 20:45

## 2025-02-07 RX ADMIN — METOPROLOL SUCCINATE 25 MG: 25 TABLET, EXTENDED RELEASE ORAL at 10:13

## 2025-02-07 RX ADMIN — TICAGRELOR 90 MG: 90 TABLET ORAL at 08:14

## 2025-02-07 RX ADMIN — MUPIROCIN 1 APPLICATION: 20 OINTMENT TOPICAL at 08:53

## 2025-02-07 RX ADMIN — FUROSEMIDE 40 MG: 10 INJECTION, SOLUTION INTRAMUSCULAR; INTRAVENOUS at 10:12

## 2025-02-07 RX ADMIN — SENNOSIDES AND DOCUSATE SODIUM 2 TABLET: 50; 8.6 TABLET ORAL at 10:13

## 2025-02-07 RX ADMIN — CHLORHEXIDINE GLUCONATE 15 ML: 1.2 RINSE ORAL at 08:13

## 2025-02-07 RX ADMIN — BUDESONIDE 0.5 MG: 0.5 INHALANT RESPIRATORY (INHALATION) at 07:13

## 2025-02-07 RX ADMIN — TICAGRELOR 90 MG: 90 TABLET ORAL at 20:45

## 2025-02-07 RX ADMIN — Medication 5 MG: at 20:45

## 2025-02-07 RX ADMIN — MUPIROCIN 1 APPLICATION: 20 OINTMENT TOPICAL at 20:45

## 2025-02-07 NOTE — PLAN OF CARE
Goal Outcome Evaluation:  Plan of Care Reviewed With: patient, spouse           Outcome Evaluation: 58 yo male adm 2/3/25. Presents to [facility] w/ severe soa. Was dx w/ acute respiratory failure and required intubation. Was then transferred to [facility], where he was also dx w/ stemi, a fib w/ rvr, shock 2* distributive and cardiogenic causes, multifocal pna, hyperglycemia, (+) flu. Was intub from 2/3 to 2/5. Pt has EF of 15%. PMH: severe copd, home O2 prn 2L. At baseline, pt lives w/ wife in single level home w/ 4 stairs and handrail to enter. Pt normally ambulates short community distances only. Does not use assistive device and has no DME at home. Uses scooter in large stores. Today, pt is tolerating being weaned to room air w/ O2 sats remaining 95% (RN approved). Pt has marked proximal weakness, and he has severe soa w/ just coming to standing. Not able to progress to ambulation due to soa. Pt not safe for home and will require SNF at d/c. Will follow.

## 2025-02-07 NOTE — THERAPY EVALUATION
Patient Name: Chu Peralta  : 1965    MRN: [id number]                              Today's Date: 2025       Admit Date: 2/3/2025    Visit Dx:     ICD-10-CM ICD-9-CM   1. Acute on chronic respiratory failure with hypoxia and hypercapnia  J96.21 518.84    J96.22 786.09     799.02     Patient Active Problem List   Diagnosis    Acute on chronic respiratory failure    STEMI involving right coronary artery     Past Medical History:   Diagnosis Date    Anxiety     COPD (chronic obstructive pulmonary disease)     Depression     Emphysema lung     Hypertension     Lung nodule     Restless leg      Past Surgical History:   Procedure Laterality Date    CARDIAC CATHETERIZATION N/A 2/3/2025    Procedure: Left Heart Cath;  Surgeon: Dale Peterson DO;  Location:  [facility];  Service: Cardiovascular;  Laterality: N/A;    CARDIAC CATHETERIZATION N/A 2/3/2025    Procedure: Percutaneous Coronary Intervention;  Surgeon: Dale Peterson DO;  Location:  [facility];  Service: Cardiovascular;  Laterality: N/A;    CARDIAC CATHETERIZATION N/A 2/3/2025    Procedure: Stent SHELLY coronary;  Surgeon: Dale Peterson DO;  Location:  [facility];  Service: Cardiovascular;  Laterality: N/A;  RCA      General Information       Row Name 25 1552          OT Time and Intention    Subjective Information complains of;weakness;fatigue;dyspnea  -KT     Document Type evaluation  -KT     Mode of Treatment occupational therapy  -KT     Patient Effort excellent  -KT     Symptoms Noted During/After Treatment fatigue;shortness of breath  -KT       Row Name 25 1559          General Information    Patient Profile Reviewed yes  -KT     Prior Level of Function independent:;ADL's;all household mobility;min assist:;mod assist:;home management;cooking;cleaning  -KT     Existing Precautions/Restrictions oxygen therapy device and L/min  -KT     Barriers to  Rehab medically complex;previous functional deficit  -KT       Row Name 02/07/25 1556          Occupational Profile    Reason for Services/Referral (Occupational Profile) Pt is a 59 y.o. year old male admitted to [facility] on 2/3/25 with respiratory distress. Pt found to have Flu A and due to severe respiratory distress pt was intubated    Pmhx significant for COPD, chronic hypoxic respiratory failure (2L O2 at home), tobacco use disorder, RLS, Anxiety, HTN    At baseline, pt lives with spouse in [facility] with 4STE and handrail. They have a tub shower combo with no sc or grab bars. He was previously independent with all ADLs and was assisting with some IADLs though he became fatigued quickly and required frequent rest breaks. He was amb without AD for household distances but required motor scooter at community distances (motorized scooter at grocery store). Wife works part-time. Pt on 3L O2 prn at baseline.    At [facility], pt is A&Ox4 and notes that he feels weaker than usual. Initally on 3L O2 but weaned down to room air and maintained 96% or higher for the majority of session. Left on room air at end of session. Pt UE strength noted to be significantly decreased, especially proximal shoulder stability. Able to CTS with min Ax2 but limited tolerance and required support to maintain standing. OT to follow while inpatient and recommends SNF at [place] to maximize strength and endurance for ADLs and IADLs prior to return to home with wife.  -[employer]       Row Name 02/07/25 1552          Living Environment    People in Home spouse  -[employer]       Row Name 02/07/25 1552          Home Main Entrance    Number of Stairs, Main Entrance four  -KT     Stair Railings, Main Entrance railings safe and in good condition  -KT       Row Name 02/07/25 1552          Stairs Within Home, Primary    Number of Stairs, Within Home, Primary none  -KT       Row Name 02/07/25 1552          Cognition    Orientation Status (Cognition) oriented x 4  -KT       Row Name  02/07/25 1552          Safety Issues/Impairments Affecting Functional Mobility    Impairments Affecting Function (Mobility) balance;coordination;endurance/activity tolerance;postural/trunk control;strength  -KT               User Key  (r) = Recorded By, (t) = Taken By, (c) = Cosigned By      Initials Name Provider Type    Maryjane Rodriguez OT Occupational Therapist                     Mobility/ADL's       [facility] Name 02/07/25 1557          Transfers    Transfers sit-stand transfer;stand-sit transfer  -KT       [facility] Name 02/07/25 1557          Sit-Stand Transfer    Sit-Stand Startex (Transfers) minimum assist (75% patient effort);2 person assist  -KT       [facility] Name 02/07/25 1557          Stand-Sit Transfer    Stand-Sit Startex (Transfers) minimum assist (75% patient effort);2 person assist  -[facility] Name 02/07/25 1557          Functional Mobility    Functional Mobility- Comment Pt HR increased and RR increased with standing. Low activity tolerance limiting safety with functional mobility  -KT     Patient was able to Ambulate no, other medical factors prevent ambulation  -[facility] Name 02/07/25 1557          Activities of Daily Living    BADL Assessment/Intervention upper body dressing;bathing;lower body dressing;toileting  -KT       Row Name 02/07/25 1557          Upper Body Dressing Assessment/Training    Startex Level (Upper Body Dressing) upper body dressing skills;minimum assist (75% patient effort)  -KT       Row Name 02/07/25 1557          Bathing Assessment/Intervention    Startex Level (Bathing) bathing skills;moderate assist (50% patient effort)  -KT       Row Name 02/07/25 1557          Lower Body Dressing Assessment/Training    Startex Level (Lower Body Dressing) lower body dressing skills;maximum assist (25% patient effort)  -KT       Row Name 02/07/25 1557          Toileting Assessment/Training    Startex Level (Toileting) toileting skills;moderate assist (50%  patient effort)  -KT               User Key  (r) = Recorded By, (t) = Taken By, (c) = Cosigned By      Initials Name Provider Type    Maryjane Rodriguez OT Occupational Therapist                   Obj/Interventions       Row Name 02/07/25 1558          Sensory Assessment (Somatosensory)    Sensory Assessment (Somatosensory) sensation intact  -KT       Row Name 02/07/25 1558          Vision Assessment/Intervention    Visual Impairment/Limitations WFL  -KT       Row Name 02/07/25 1558          Range of Motion Comprehensive    General Range of Motion bilateral upper extremity ROM WFL  -KT       Row Name 02/07/25 1558          Strength Comprehensive (MMT)    General Manual Muscle Testing (MMT) Assessment upper extremity strength deficits identified  -KT     Comment, General Manual Muscle Testing (MMT) Assessment grossly 3+/5 at shoulders and 4/5 at forarms.  -KT       Row Name 02/07/25 1558          Balance    Balance Assessment sitting static balance;sitting dynamic balance;standing static balance;standing dynamic balance  -KT     Static Sitting Balance contact guard  -KT     Dynamic Sitting Balance minimal assist  -KT     Static Standing Balance minimal assist  -KT     Dynamic Standing Balance moderate assist  -KT               User Key  (r) = Recorded By, (t) = Taken By, (c) = Cosigned By      Initials Name Provider Type    Maryjane Rodriguez OT Occupational Therapist                   Goals/Plan       Row Name 02/07/25 1600          Transfer Goal 1 (OT)    Activity/Assistive Device (Transfer Goal 1, OT) toilet;commode  -KT     Simpson Level/Cues Needed (Transfer Goal 1, OT) supervision required  -KT     Time Frame (Transfer Goal 1, OT) long term goal (LTG)  -KT       Row Name 02/07/25 1600          Toileting Goal 1 (OT)    Activity/Device (Toileting Goal 1, OT) toileting skills, all  -KT     Simpson Level/Cues Needed (Toileting Goal 1, OT) contact guard required  -KT     Time Frame (Toileting  Goal 1, OT) long term goal (LTG)  -KT       Row Name 02/07/25 1600          Problem Specific Goal 1 (OT)    Problem Specific Goal 1 (OT) Pt will tolerate standing x2-3 minutes with CGA and UE support as needed while engaging in ADLs, activites, exercises to maximize return to PLOF and functional endurance/act. alexis.  -KT     Time Frame (Problem Specific Goal 1, OT) long term goal (LTG)  -KT       Row Name 02/07/25 1600          Therapy Assessment/Plan (OT)    Planned Therapy Interventions (OT) activity tolerance training;adaptive equipment training;BADL retraining;functional balance retraining;IADL retraining;neuromuscular control/coordination retraining;occupation/activity based interventions;patient/caregiver education/training;ROM/therapeutic exercise;strengthening exercise;transfer/mobility retraining  -KT               User Key  (r) = Recorded By, (t) = Taken By, (c) = Cosigned By      Initials Name Provider Type    KT Maryjane Salazar OT Occupational Therapist                   Clinical Impression       Row Name 02/07/25 155          Pain Assessment    Pretreatment Pain Rating 2/10  -KT     Posttreatment Pain Rating 2/10  -KT       Row Name 02/07/25 1554          Plan of Care Review    Plan of Care Reviewed With patient;spouse  -KT     Progress improving  -KT     Outcome Evaluation Pt is a 59 y.o. year old male admitted to [facility] on 2/3/25 with respiratory distress. Pt found to have Flu A and due to severe respiratory distress pt was intubated    Pmhx significant for COPD, chronic hypoxic respiratory failure (2L O2 at home), tobacco use disorder, RLS, Anxiety, HTN    At baseline, pt lives with spouse in [facility] with 4STE and handrail. They have a tub shower combo with no sc or grab bars. He was previously independent with all ADLs and was assisting with some IADLs though he became fatigued quickly and required frequent rest breaks. He was amb without AD for household distances but required motor scooter at  community distances (motorized scooter at grocery store). Wife works part-time. Pt on 3L O2 prn at baseline.    At eval, pt is A&Ox4 and notes that he feels weaker than usual. Initally on 3L O2 but weaned down to room air and maintained 96% or higher for the majority of session. Left on room air at end of session. Pt UE strength noted to be significantly decreased, especially proximal shoulder stability. Able to CTS with min Ax2 but limited tolerance and required support to maintain standing. OT to follow while inpatient and recommends SNF at [place] to maximize strength and endurance for ADLs and IADLs prior to return to home with wife.  -KT       Row Name 02/07/25 7809          Therapy Assessment/Plan (OT)    Patient/Family Therapy Goal Statement (OT) eventually return to home following therapy  -KT     Rehab Potential (OT) good  -KT     Criteria for Skilled Therapeutic Interventions Met (OT) yes;skilled treatment is necessary  -KT     Therapy Frequency (OT) 5 times/wk  -KT       Row Name 02/07/25 1666          Therapy Plan Review/Discharge Plan (OT)    Anticipated Discharge Disposition (OT) skilled nursing facility  -KT       Row Name 02/07/25 0051          Positioning and Restraints    Pre-Treatment Position sitting in chair/recliner  -KT     Post Treatment Position chair  -KT     In Chair encouraged to call for assist;exit alarm on;call light within reach;with family/caregiver  -KT               User Key  (r) = Recorded By, (t) = Taken By, (c) = Cosigned By      Initials Name Provider Type    Maryjane Rodriguez, OT Occupational Therapist                   Outcome Measures       Row Name 02/07/25 1601          How much help from another is currently needed...    Putting on and taking off regular lower body clothing? 2  -KT     Bathing (including washing, rinsing, and drying) 2  -KT     Toileting (which includes using toilet bed pan or urinal) 2  -KT     Putting on and taking off regular upper body clothing 3  -KT      Taking care of personal grooming (such as brushing teeth) 3  -KT     Eating meals 4  -KT     AM-PAC 6 Clicks Score (OT) 16  -KT       Row Name 02/07/25 1542          How much help from another person do you currently need...    Turning from your back to your side while in flat bed without using bedrails? 3  -CM     Moving from lying on back to sitting on the side of a flat bed without bedrails? 3  -CM     Moving to and from a bed to a chair (including a wheelchair)? 2  -CM     Standing up from a chair using your arms (e.g., wheelchair, bedside chair)? 3  -CM     Climbing 3-5 steps with a railing? 1  -CM     To walk in hospital room? 1  -CM     AM-PAC 6 Clicks Score (PT) 13  -CM       Row Name 02/07/25 1601          Functional Assessment    Outcome Measure Options AM-PAC 6 Clicks Daily Activity (OT)  -KT               User Key  (r) = Recorded By, (t) = Taken By, (c) = Cosigned By      Initials Name Provider Type    CM Jocelynn Nagel, PT Physical Therapist    KT Maryjane Salazar, OT Occupational Therapist                    Occupational Therapy Education        No education to display                  OT Recommendation and Plan  Planned Therapy Interventions (OT): activity tolerance training, adaptive equipment training, BADL retraining, functional balance retraining, IADL retraining, neuromuscular control/coordination retraining, occupation/activity based interventions, patient/caregiver education/training, ROM/therapeutic exercise, strengthening exercise, transfer/mobility retraining  Therapy Frequency (OT): 5 times/wk  Plan of Care Review  Plan of Care Reviewed With: patient, spouse  Progress: improving  Outcome Evaluation: Pt is a 59 y.o. year old male admitted to [facility] on 2/3/25 with respiratory distress. Pt found to have Flu A and due to severe respiratory distress pt was intubated    Pmhx significant for COPD, chronic hypoxic respiratory failure (2L O2 at home), tobacco use disorder, RLS, Anxiety, HTN     At baseline, pt lives with spouse in [facility] with 4STE and handrail. They have a tub shower combo with no sc or grab bars. He was previously independent with all ADLs and was assisting with some IADLs though he became fatigued quickly and required frequent rest breaks. He was amb without AD for household distances but required motor scooter at community distances (motorized scooter at grocery store). Wife works part-time. Pt on 3L O2 prn at baseline.    At eval, pt is A&Ox4 and notes that he feels weaker than usual. Initally on 3L O2 but weaned down to room air and maintained 96% or higher for the majority of session. Left on room air at end of session. Pt UE strength noted to be significantly decreased, especially proximal shoulder stability. Able to CTS with min Ax2 but limited tolerance and required support to maintain standing. OT to follow while inpatient and recommends SNF at [place] to maximize strength and endurance for ADLs and IADLs prior to return to home with wife.     Time Calculation:   Evaluation Complexity (OT)  Overall Complexity of Evaluation (OT): moderate complexity     Time Calculation- OT       Row Name 02/07/25 1601             Time Calculation- OT    OT Start Time 1415  -KT      OT Stop Time 1433  -KT      OT Time Calculation (min) 18 min  -KT      OT Non-Billable Time (min) 18 min  -KT      OT Received On 02/07/25  -KT      OT - Next Appointment 02/10/25  -KT      OT Goal Re-Cert Due Date 02/21/25  -KT                User Key  (r) = Recorded By, (t) = Taken By, (c) = Cosigned By      Initials Name Provider Type    Maryjane Rodriguez OT Occupational Therapist                  Therapy Charges for Today       Code Description Service Date Service Provider Modifiers Qty    [id number]  OT EVAL MOD COMPLEXITY 4 2/7/2025 Maryjane Salazar OT GO 1                 Maryjane Salazar OT  2/7/2025

## 2025-02-07 NOTE — PLAN OF CARE
Goal Outcome Evaluation:  Plan of Care Reviewed With: patient, spouse        Progress: improving  Outcome Evaluation: Pt is a 59 y.o. year old male admitted to [facility] on 2/3/25 with respiratory distress. Pt found to have Flu A and due to severe respiratory distress pt was intubated    Pmhx significant for COPD, chronic hypoxic respiratory failure (2L O2 at home), tobacco use disorder, RLS, Anxiety, HTN    At baseline, pt lives with spouse in [facility] with 4STE and handrail. They have a tub shower combo with no sc or grab bars. He was previously independent with all ADLs and was assisting with some IADLs though he became fatigued quickly and required frequent rest breaks. He was amb without AD for household distances but required motor scooter at community distances (motorized scooter at grocery store). Wife works part-time. Pt on 3L O2 prn at baseline.    At al, pt is A&Ox4 and notes that he feels weaker than usual. Initally on 3L O2 but weaned down to room air and maintained 96% or higher for the majority of session. Left on room air at end of session. Pt UE strength noted to be significantly decreased, especially proximal shoulder stability. Able to CTS with min Ax2 but limited tolerance and required support to maintain standing. OT to follow while inpatient and recommends SNF at [place] to maximize strength and endurance for ADLs and IADLs prior to return to home with wife.    Anticipated Discharge Disposition (OT): skilled nursing facility

## 2025-02-07 NOTE — CASE MANAGEMENT/SOCIAL WORK
Continued Stay Note  DEE Yoder     Patient Name: Chu Peralta  MRN: [id number]  Today's Date: 2/7/2025    Admit Date: 2/3/2025    Plan: Plan to return home with spouse, pending clinical course.   Discharge Plan       Row Name 02/07/25 1552       Plan    Plan Comments CM spoke with spouse on the phone, declines SNF at this time, would like to take him home with daughter's help and HH.  Will follow care and revisit closer to discharge.                 Expected Discharge Date and Time       Expected Discharge Date Expected Discharge Time    Feb 11, 2025               MARIJA Alexis RN  ICU/CVU   O: [phone]  C: [phone]  [email or web address]

## 2025-02-07 NOTE — PROGRESS NOTES
Critical Care Progress Note     Chu Peralta : 1965 MRN:[id number] LOS:4  Rm: 3124/1     Principal Problem: Acute on chronic respiratory failure     Reason for follow up: All the medical problems listed below    Summary     Chu Peralta is a 59 y.o. male with PMH of COPD, Chronic hypoxic respiratory failure on home 2 L, tobacco use disorder, RLS who presented to the hospital for respiratory distress and was admitted on 2/3/2025  4:39 PM with a principal diagnosis of Acute on chronic respiratory failure.      Patient was initially brought into an outside hospital and was in respiratory distress.  Placed on BiPAP.  Initially was DNI however rescinded the DNR and was subsequently intubated.  Patient was being treated with antibiotics for multifocal pneumonia as well as influenza A.  Patient was given broad-spectrum antibiotics and transferred to [facility] for further care.    Upon arrival to [facility] the patient was noted to have concerning EKG for acute coronary syndrome and went emergently to the Cath Lab with cardiology.  He had an RCA occlusion with 1 stent placed.  Postoperative EF was around 15%.  Patient was admitted to the ICU for further care.      Chu Peralta is now Hospital Day: 5    Significant Events / Subjective     24 hour events 25 : HFNC in daytime, BIPAP overnight.  No vasopressors, given amiodarone and digoxin yesterday, no longer in fib.  Transition heparin gtt to lovenox    Assessment / Plan   PLAN by systems:    Neuro  #Analgesia/Sedation  -PRN tylenol      Cardiovascular  Pulse  Av.6  Min: 79  Max: 142  Resp  Av.4  Min: 22  Max: 39  SpO2  Av.7 %  Min: 84 %  Max: 100 %  Systolic (24hrs), Av , Min:86 , Max:139     Diastolic (24hrs), Av, Min:53, Max:81    #STEMI (RCA)  #HFrEF (15-20%)  #AF with RVR  -s/p cath with RCA stent placement on 2/3   -ASA/Brillinta  -Statin  -Was briefly on dobutamine 2/4  -Jardiance 2/5  -Amio loaded, now on  PO  -Digoxin once yesterday, now in sinus  -Restart metop  -Lactic cleared  -Therapeutic lovenox  -Continue to monitor    JOSE?DS?-VASc Score for Atrial Fibrillation Stroke Risk - MDCalc  Calculated on Feb 06 2025 8:56 AM  4 points -> Stroke risk was 4.8% per year in >90,000 patients (the [place] Atrial Fibrillation Cohort Study) and 6.7% risk of stroke/TIA/systemic embolism.    Pulmonary  ABG:  pH, Arterial   Date Value Ref Range Status   02/05/2025 7.360 7.350 - 7.450 pH units Final   02/05/2025 7.180 (C) 7.350 - 7.450 pH units Final   02/05/2025 7.478 (H) 7.350 - 7.450 pH units Final     pO2, Arterial   Date Value Ref Range Status   02/05/2025 106.8 83.0 - 108.0 mm Hg Final   02/05/2025 201.2 (H) 83.0 - 108.0 mm Hg Final   02/05/2025 120.0 (H) 83.0 - 108.0 mm Hg Final     pCO2, Arterial   Date Value Ref Range Status   02/05/2025 56.8 (H) 35.0 - 48.0 mm Hg Final   02/05/2025 74.7 (C) 35.0 - 48.0 mm Hg Final   02/05/2025 37.2 35.0 - 48.0 mm Hg Final     HCO3, Arterial   Date Value Ref Range Status   02/05/2025 32.1 (H) 21.0 - 28.0 mmol/L Final   02/05/2025 27.9 21.0 - 28.0 mmol/L Final   02/05/2025 27.6 21.0 - 28.0 mmol/L Final        #COPD  #Hypoxic, hypercapenic respiratory failure  -On methylpred  -Alternates from bipap to NC  -40 lasix this AM  -Nebs  -Maintain O2 sats >92%  -CXR showing: No significant consolidations, PTX, or effusions      GI/Nutrition  #No acute issues  Diet: Cardiac; Healthy Heart (2-3 Na+); Fluid Consistency: Thin (IDDSI 0)  Patient isn't on Tube Feeding   Bowel regimen: docusate/miralax prn  Stress ulcer ppx:lansoprazole  -Cleared to swallow    Renal/      Lab 02/07/25  0420 02/06/25  0310 02/05/25  2144 02/05/25  1836 02/05/25  0415 02/04/25  0518 02/03/25 2320 02/03/25 2035 02/03/25  1716   SODIUM 139 139 139  --  138 139  --   --  135*   POTASSIUM 4.3 4.6 4.7  --  3.8 4.7 5.7*  --  5.1   CHLORIDE 103 103 103  --  106 105  --   --  101   CO2 30.6* 28.0 28.6  --  24.0 21.1*  --   --   25.4   BUN 27* 24* 21  --  18 20  --   --  11   CREATININE 0.55* 0.62* 0.64* 0.82 0.50* 1.02  --  1.19 0.91   GLUCOSE 82 96 115*  --  153* 209*  --   --  186*   CALCIUM 8.2* 8.5* 8.6  --  8.7 8.7  --   --  8.7   PHOSPHORUS 2.7 3.0  --   --  1.4* 3.7  --   --  5.1*     #No acute issues  -Monitor electrolytes and UOP  -Monitor nephrotoxic agents administered  -Mims- removed  -lasix 40 this AM      Intake/Output Summary (Last 24 hours) at 2025 1122  Last data filed at 2025 0716  Gross per 24 hour   Intake 1248 ml   Output 1090 ml   Net 158 ml       Heme/Onc  Hemoglobin   Date Value Ref Range Status   2025 10.6 (L) 13.0 - 17.7 g/dL Final   2025 10.4 (L) 13.0 - 17.7 g/dL Final   2025 13.3 12.0 - 17.0 g/dL Final   2025 10.5 (L) 13.0 - 17.7 g/dL Final     Platelets   Date Value Ref Range Status   2025 230 140 - 450 10*3/mm3 Final   2025 239 140 - 450 10*3/mm3 Final   2025 283 140 - 450 10*3/mm3 Final     #No acute issues  -Usual transfusion parameters (HgB <7, Plt <10 unless procedures or bleeding)  -DVT PPX: VTE Prophylaxis:Therapeutic lovenox  Pharmacologic VTE prophylaxis orders are present.         Endocrine  Glucose   Date Value Ref Range Status   2025 82 65 - 99 mg/dL Final   2025 96 65 - 99 mg/dL Final   2025 115 (H) 65 - 99 mg/dL Final   2025 153 (H) 65 - 99 mg/dL Final     #hyperglycemia  -Elevated A1c, no history of DM  -Steroids exacerbating symptoms  -Goal  140-180  -Accuchecks and SSI    ID  WBC   Date Value Ref Range Status   2025 8.38 3.40 - 10.80 10*3/mm3 Final   2025 12.39 (H) 3.40 - 10.80 10*3/mm3 Final   2025 18.99 (H) 3.40 - 10.80 10*3/mm3 Final     Temp (24hrs), Av °F (36.7 °C), Min:97.5 °F (36.4 °C), Max:98.6 °F (37 °C)    #Shock  #Possible pneumonia  #influenza A  -Leukocytosis to 25 on presentation  -CT chest c/f multifocal pneumonia at OSH  -negative strep/legionella antigen   -No evidence of infection  in urine  -RVP positive for influenza A.  Per wife symptoms has been going on >48 hours. No indications for tamiflu at this time  -Completed course of CTX  -Monitor for signs of infection  -This patient does not have an active medication from one of the medication groupers.    MSK/Skin  #No acute issues  -PT/OT  -Media tab for wound pictures      Disposition:  PCU    Code status:   Level Of Support Discussed With: Patient  Code Status (Patient has no pulse and is not breathing): CPR (Attempt to Resuscitate)  Medical Interventions (Patient has pulse or is breathing): Full Support           Objective / Physical Exam     Vital signs:  Temp: 98.3 °F (36.8 °C)  BP: 134/78  Heart Rate: 98  Resp: 27  SpO2: 97 %  Weight: 75.3 kg (166 lb 0.1 oz)    Admission Weight: Weight: 76 kg (167 lb 8.8 oz)  Current Weight: Weight: 75.3 kg (166 lb 0.1 oz)    Input/Output in last 24 hours:    Intake/Output Summary (Last 24 hours) at 2/7/2025 1122  Last data filed at 2/7/2025 0716  Gross per 24 hour   Intake 1248 ml   Output 1090 ml   Net 158 ml      Net IO Since Admission: 2,898 mL [02/07/25 1122]     GENERAL APPEARANCE:  Sitting up in bed on HFNC with mild tachypnea  HEENT:  Normal conjunctivae, normal eyelids  NECK:  trachea midline  HEART: Tachycardia but regular rhythm  LUNGS:  CTAB, no wheezing, tachypnea  ABDOMEN:  Soft, nontender, nondistended   :no badillo  EXTREMITIES:  trace pedal edema  NEUROLOGICAL: Awake/alert/interactive  Skin:  Strong distal pulses, warm feet    Radiology and Labs     Results from last 7 days   Lab Units 02/07/25  0420 02/06/25  0310 02/05/25  1836 02/05/25  0415 02/04/25  0518 02/03/25  2035 02/03/25  1716   WBC 10*3/mm3 8.38 12.39*  --  18.99* 18.70*  --  25.64*   HEMOGLOBIN g/dL 10.6* 10.4*  --  10.5* 12.0*  --  12.6*   HEMOGLOBIN, POC g/dL  --   --  13.3  --   --    < >  --    HEMATOCRIT % 33.5* 33.6*  --  32.5* 37.6  --  40.6   HEMATOCRIT POC %  --   --  39  --   --    < >  --    PLATELETS 10*3/mm3  230 239  --  283 330  --  336    < > = values in this interval not displayed.      Results from last 7 days   Lab Units 02/07/25  0813 02/06/25  2358 02/06/25  1609 02/06/25  0938 02/06/25 0310 02/04/25 0518 02/03/25  1716   PROTIME Seconds  --   --   --  13.7  --   --  15.3*   INR   --   --   --  1.05  --   --  1.21*   APTT seconds 51.0* 60.6* 102.7* 35.2 38.8*   < > >200.0*    < > = values in this interval not displayed.      Results from last 7 days   Lab Units 02/07/25 0420 02/06/25 0310 02/05/25 2144 02/05/25 1836 02/05/25 0415 02/04/25 0518 02/03/25 2035 02/03/25  1716   SODIUM mmol/L 139 139 139  --  138 139  --  135*   POTASSIUM mmol/L 4.3 4.6 4.7  --  3.8 4.7   < > 5.1   CHLORIDE mmol/L 103 103 103  --  106 105  --  101   CO2 mmol/L 30.6* 28.0 28.6  --  24.0 21.1*  --  25.4   BUN mg/dL 27* 24* 21  --  18 20  --  11   CREATININE mg/dL 0.55* 0.62* 0.64* 0.82 0.50* 1.02   < > 0.91   GLUCOSE mg/dL 82 96 115*  --  153* 209*  --  186*   MAGNESIUM mg/dL 2.4 2.7* 2.9*  --  2.3 2.5*  --  2.3   PHOSPHORUS mg/dL 2.7 3.0  --   --  1.4* 3.7  --  5.1*    < > = values in this interval not displayed.      Results from last 7 days   Lab Units 02/07/25 0420 02/06/25 0310 02/05/25 0415 02/04/25 0518 02/03/25 1716   ALK PHOS U/L 50 51 48 58 66   AST (SGOT) U/L 81* 124* 169* 183* 84*   ALT (SGPT) U/L 38 50* 56* 49* 30     Results from last 7 days   Lab Units 02/06/25  0308 02/05/25 2000 02/05/25  1836 02/05/25  0336 02/04/25  0733 02/04/25  0250   PH, ARTERIAL pH units  --  7.360 7.180* 7.478* 7.358 7.295*   PCO2, ARTERIAL mm Hg  --  56.8* 74.7* 37.2 42.9 49.1*   PO2 ART mm Hg  --  106.8 201.2* 120.0* 180.0* 97.0   O2 SATURATION ART %  --  97.7 99.4* 99.0* 99.5* 96.6   FIO2 % 35 35 60 40 50 60   HCO3 ART mmol/L  --  32.1* 27.9 27.6 24.1 23.9   BASE EXCESS ART mmol/L  --  5.1* -2.2* 3.9* -1.4* -3.1*       XR Chest 1 View    Result Date: 2/7/2025  Impression: No significant interval change. Electronically Signed:  Rk Medina MD  2/7/2025 7:57 AM EST  Workstation ID: [id number]    XR Chest 1 View    Result Date: 2/6/2025  Impression: No significant interval change. Electronically Signed: Alvaro Loera MD  2/6/2025 7:58 AM EST  Workstation ID: [id number]    XR Chest 1 View    Result Date: 2/5/2025  Impression: 1.Interval extubation. 2.Persistent diffuse interstitial opacities with perihilar and left midlung airspace opacities. Electronically Signed: Alvaro Loera MD  2/5/2025 7:08 PM EST  Workstation ID: [id number]     Current medications     Scheduled Meds:   amiodarone, 200 mg, Oral, Q12H  aspirin, 81 mg, Nasogastric, Daily  atorvastatin, 40 mg, Nasogastric, Nightly  budesonide, 0.5 mg, Nebulization, BID - RT  chlorhexidine, 15 mL, Mouth/Throat, Q12H  empagliflozin, 10 mg, Oral, Daily  enoxaparin, 1 mg/kg, Subcutaneous, Q12H  escitalopram, 20 mg, Nasogastric, Daily  insulin lispro, 4-24 Units, Subcutaneous, Q6H  lansoprazole, 30 mg, Nasogastric, Q AM  [START ON 2/8/2025] lisinopril, 5 mg, Oral, Daily  methylPREDNISolone sodium succinate, 40 mg, Intravenous, Daily  metoprolol succinate XL, 25 mg, Oral, Daily  mupirocin, 1 Application, Each Nare, BID  senna-docusate sodium, 2 tablet, Oral, BID   And  [START ON 2/8/2025] polyethylene glycol, 17 g, Oral, Daily  sodium chloride, 10 mL, Intravenous, Q12H  ticagrelor, 90 mg, Nasogastric, BID        Continuous Infusions:            Plan discussed with RN. Reviewed all other data in the last 24 hours, including but not limited to vitals, labs, microbiology, imaging and pertinent notes from other providers.  Plan also discussed with patient's wife at the bedside.      Dante Truong MD   Critical Care  02/07/25   11:22 EST

## 2025-02-07 NOTE — CASE MANAGEMENT/SOCIAL WORK
Continued Stay Note   Paresh     Patient Name: Chu Peralta  MRN: [id number]  Today's Date: 2/7/2025    Admit Date: 2/3/2025    Plan: Plan to return home with spouse, pending clinical course.   Discharge Plan       Row Name 02/07/25 0952       Plan    Plan Plan to return home with spouse, pending clinical course.    Plan Comments DC Barriers: PT/OT eval pending, BIPAP, C-line, ext cath, IV Abxs/steroids, Heparin gtt, Cardio/WC following.                 Expected Discharge Date and Time       Expected Discharge Date Expected Discharge Time    Feb 11, 2025               MARIJA Alexis RN  ICU/CVU   O: [phone]  C: [phone]  [email or web address]

## 2025-02-07 NOTE — PROGRESS NOTES
Nutrition Services  Patient Name: Chu Peralta  YOB: 1965  MRN: [id number]  Admission date: 2/3/2025    PROGRESS NOTE      Nutrition Intervention Updates: D/C TF order.      Continue current diet and encourage good PO intakes.    Monitor for need for ONS         Encounter Information: Check on for nutrition plan of care.  Patient discussed in AM rounds.  Remains extubated. No longer has NG access.  Diet advanced to Heart Healthy.  On Bipap.         PO Diet: Diet: Cardiac; Healthy Heart (2-3 Na+); Fluid Consistency: Thin (IDDSI 0)   PO Supplements: None ordered   PO Intake:  50% x 2 documented meals since diet advancement        Current nutrition support: Peptamen Intense VHP at 20 mL/hour + 20 mL/hour water    Nutrition support review: No access, will D/C order        Labs (reviewed below): Hypermagnesemia - resolved        GI Function:  No BM since admission (x 4 days ago) - RN to give bowel regimen and to work with PT (miralax has been scheduled, MD to change senna to scheduled)       Brief weight review   Wt Readings from Last 10 Encounters:   02/06/25 0600 75.3 kg (166 lb 0.1 oz)   02/05/25 0541 78.1 kg (172 lb 2.9 oz)   02/04/25 0600 75.5 kg (166 lb 7.2 oz)   02/03/25 1831 75.8 kg (167 lb)   02/03/25 1700 76 kg (167 lb 8.8 oz)   02/03/25 1651 76 kg (167 lb 8.8 oz)        Results from last 7 days   Lab Units 02/07/25  0420 02/06/25  0310 02/05/25  2144 02/05/25  1836 02/05/25  0415   SODIUM mmol/L 139 139 139  --  138   POTASSIUM mmol/L 4.3 4.6 4.7  --  3.8   CHLORIDE mmol/L 103 103 103  --  106   CO2 mmol/L 30.6* 28.0 28.6  --  24.0   BUN mg/dL 27* 24* 21  --  18   CREATININE mg/dL 0.55* 0.62* 0.64*   < > 0.50*   CALCIUM mg/dL 8.2* 8.5* 8.6  --  8.7   BILIRUBIN mg/dL 0.2 <0.2  --   --  <0.2   ALK PHOS U/L 50 51  --   --  48   ALT (SGPT) U/L 38 50*  --   --  56*   AST (SGOT) U/L 81* 124*  --   --  169*   GLUCOSE mg/dL 82 96 115*  --  153*    < > = values in this interval not displayed.     Results  from last 7 days   Lab Units 02/07/25  0420 02/06/25  0310 02/05/25  2144 02/03/25  2035 02/03/25  1716   MAGNESIUM mg/dL 2.4 2.7* 2.9*   < > 2.3   PHOSPHORUS mg/dL 2.7 3.0  --    < > 5.1*   HEMOGLOBIN g/dL 10.6* 10.4*  --    < > 12.6*   HEMOGLOBIN, POC   --   --   --    < >  --    HEMATOCRIT % 33.5* 33.6*  --    < > 40.6   HEMATOCRIT POC   --   --   --    < >  --    TRIGLYCERIDES mg/dL  --   --   --   --  49    < > = values in this interval not displayed.       RD to follow up per protocol.    Electronically signed by:  Luna Pete RD  02/07/25 09:43 EST

## 2025-02-07 NOTE — PROGRESS NOTES
Enter Query Response Below      Query Response: Bacterial pneumonia             If applicable, please update the problem list.

## 2025-02-07 NOTE — THERAPY EVALUATION
Patient Name: Chu Peralta  : 1965    MRN: [id number]                              Today's Date: 2025       Admit Date: 2/3/2025    Visit Dx:     ICD-10-CM ICD-9-CM   1. Acute on chronic respiratory failure with hypoxia and hypercapnia  J96.21 518.84    J96.22 786.09     799.02     Patient Active Problem List   Diagnosis    Acute on chronic respiratory failure    STEMI involving right coronary artery     Past Medical History:   Diagnosis Date    Anxiety     COPD (chronic obstructive pulmonary disease)     Depression     Emphysema lung     Hypertension     Lung nodule     Restless leg      Past Surgical History:   Procedure Laterality Date    CARDIAC CATHETERIZATION N/A 2/3/2025    Procedure: Left Heart Cath;  Surgeon: Dale Peterson DO;  Location:  [facility];  Service: Cardiovascular;  Laterality: N/A;    CARDIAC CATHETERIZATION N/A 2/3/2025    Procedure: Percutaneous Coronary Intervention;  Surgeon: Dale Peterson DO;  Location:  [facility];  Service: Cardiovascular;  Laterality: N/A;    CARDIAC CATHETERIZATION N/A 2/3/2025    Procedure: Stent SHELLY coronary;  Surgeon: Dale Peterson DO;  Location:  [facility];  Service: Cardiovascular;  Laterality: N/A;  RCA      General Information       Row Name 25 1527          Physical Therapy Time and Intention    Document Type evaluation  -CM     Mode of Treatment physical therapy  -CM       Row Name 25 1527          General Information    Patient Profile Reviewed yes  -CM     Prior Level of Function independent:;all household mobility;gait;driving  short community distances; uses scooter in large stores; uses 2LO2 prn at home 2* severe copd  -CM     Existing Precautions/Restrictions fall  -CM     Barriers to Rehab medically complex;previous functional deficit  -CM       Row Name 25 1527          Living Environment    People in Home spouse;child(red),  adult  wife reports someone usually available to provide 24 hr care  -CM       Row Name 02/07/25 1527          Home Main Entrance    Number of Stairs, Main Entrance four  -CM     Stair Railings, Main Entrance railings safe and in good condition  -CM       Row Name 02/07/25 1527          Stairs Within Home, Primary    Number of Stairs, Within Home, Primary none  -CM       Row Name 02/07/25 1527          Cognition    Orientation Status (Cognition) oriented x 4  -CM       Row Name 02/07/25 1527          Safety Issues/Impairments Affecting Functional Mobility    Impairments Affecting Function (Mobility) balance;endurance/activity tolerance;strength;pain;shortness of breath  -CM               User Key  (r) = Recorded By, (t) = Taken By, (c) = Cosigned By      Initials Name Provider Type    Jocelynn Burr, PT Physical Therapist                   Mobility       Row Name 02/07/25 1529          Bed Mobility    Bed Mobility bed mobility (all) activities  -CM     All Activities, [place] (Bed Mobility) not tested  -CM     Comment, (Bed Mobility) in chair when PT arrived.  -CM       Row Name 02/07/25 1529          Bed-Chair Transfer    Bed-Chair [place] (Transfers) not tested  -CM       Row Name 02/07/25 1529          Sit-Stand Transfer    Sit-Stand [place] (Transfers) minimum assist (75% patient effort)  -       Row Name 02/07/25 1529          Gait/Stairs (Locomotion)    [place] Level (Gait) unable to assess  -CM     Patient was able to Ambulate no, other medical factors prevent ambulation  -CM     Reason Patient was unable to Ambulate Hypoxia/Respiratory Distress;Other (Comment)  severe soa w/ standing alone  -CM     Distance in Feet (Gait) 0  -CM               User Key  (r) = Recorded By, (t) = Taken By, (c) = Cosigned By      Initials Name Provider Type    Jocelynn Burr, PT Physical Therapist                   Obj/Interventions       Row Name 02/07/25 1530          Range of Motion  Comprehensive    General Range of Motion bilateral lower extremity ROM WNL  -CM       Row Name 02/07/25 1530          Strength Comprehensive (MMT)    General Manual Muscle Testing (MMT) Assessment lower extremity strength deficits identified  -CM     Comment, General Manual Muscle Testing (MMT) Assessment B hips 3-/5; other LE mm groups 4-/5  -CM       Row Name 02/07/25 1530          Balance    Balance Assessment sitting static balance;sitting dynamic balance;standing static balance;standing dynamic balance  -CM     Static Sitting Balance independent  -CM     Dynamic Sitting Balance independent  -CM     Position, Sitting Balance sitting in chair  -CM     Static Standing Balance minimal assist  -CM     Dynamic Standing Balance minimal assist;moderate assist  -CM     Position/Device Used, Standing Balance supported  -CM       Row Name 02/07/25 1530          Sensory Assessment (Somatosensory)    Sensory Assessment (Somatosensory) sensation intact  -CM               User Key  (r) = Recorded By, (t) = Taken By, (c) = Cosigned By      Initials Name Provider Type    CM Jocelynn Nagel, PT Physical Therapist                   Goals/Plan       Row Name 02/07/25 1541          Bed Mobility Goal 1 (PT)    Activity/Assistive Device (Bed Mobility Goal 1, PT) bed mobility activities, all  -CM     [place] Level/Cues Needed (Bed Mobility Goal 1, PT) independent  -CM     Time Frame (Bed Mobility Goal 1, PT) 2 weeks  -CM       Row Name 02/07/25 1541          Transfer Goal 1 (PT)    Activity/Assistive Device (Transfer Goal 1, PT) transfers, all;walker, rolling  -CM     [place] Level/Cues Needed (Transfer Goal 1, PT) modified independence  -CM     Time Frame (Transfer Goal 1, PT) 2 weeks  -CM     Strategies/Barriers (Transfers Goal 1, PT) no soa, sats > 92%, RR < 30  -CM       Row Name 02/07/25 1541          Gait Training Goal 1 (PT)    Activity/Assistive Device (Gait Training Goal 1, PT) gait (walking locomotion);walker,  rolling  -CM     [place] Level (Gait Training Goal 1, PT) contact guard required  -CM     Distance (Gait Training Goal 1, PT) 100 ft, no soa, sats > 92%, RR < 30  -CM     Time Frame (Gait Training Goal 1, PT) 2 weeks  -CM       Row Name 02/07/25 1541          Therapy Assessment/Plan (PT)    Planned Therapy Interventions (PT) balance training;bed mobility training;gait training;home exercise program;neuromuscular re-education;motor coordination training;patient/family education;postural re-education;strengthening;ROM (range of motion);transfer training  -CM               User Key  (r) = Recorded By, (t) = Taken By, (c) = Cosigned By      Initials Name Provider Type    CM Jocelynn Nagel, PT Physical Therapist                   Clinical Impression       Row Name 02/07/25 1531          Pain    Pretreatment Pain Rating 2/10  -CM     Posttreatment Pain Rating 2/10  -CM     Pain Location other (see comments)  generalized  -CM     Pain Management Interventions activity modification encouraged;breathing exercises utilized;nursing notified;diversional activity provided;exercise or physical activity utilized  -CM     Response to Pain Interventions activity participation with tolerable pain  -CM       Row Name 02/07/25 1531          Plan of Care Review    Plan of Care Reviewed With patient;spouse  -CM     Outcome Evaluation 58 yo male adm 2/3/25. Presents to [facility] w/ severe soa. Was dx w/ acute respiratory failure and required intubation. Was then transferred to [facility], where he was also dx w/ stemi, a fib w/ rvr, shock 2* distributive and cardiogenic causes, multifocal pna, hyperglycemia, (+) flu. Was intub from 2/3 to 2/5. Pt has EF of 15%. PMH: severe copd, home O2 prn 2L. At baseline, pt lives w/ wife in single level home w/ 4 stairs and handrail to enter. Pt normally ambulates short community distances only. Does not use assistive device and has no DME at home. Uses scooter in large stores. Today, pt is tolerating being  weaned to room air w/ O2 sats remaining 95% (RN approved). Pt has marked proximal weakness, and he has severe soa w/ just coming to standing. Not able to progress to ambulation due to soa. Pt not safe for home and will require SNF at d/c. Will follow.  -       Row Name 02/07/25 1539          Therapy Assessment/Plan (PT)    Patient/Family Therapy Goals Statement (PT) agreeable to SNF at d/c  -     Rehab Potential (PT) good  -CM     Criteria for Skilled Interventions Met (PT) yes;meets criteria;skilled treatment is necessary  -     Therapy Frequency (PT) 5 times/wk  -CM     Predicted Duration of Therapy Intervention (PT) until d/c.  -       Row Name 02/07/25 1539          Vital Signs    Pre Systolic BP Rehab 129  -CM     Pre Treatment Diastolic BP 72  -CM     Post Systolic BP Rehab 128  -CM     Post Treatment Diastolic BP 88  -CM     Pretreatment Heart Rate (beats/min) 104  -CM     Intratreatment Heart Rate (beats/min) 110  -CM     Posttreatment Heart Rate (beats/min) 103  -CM     Pretreatment Resp Rate (breaths/min) 19  -CM     Intratreatment Resp Rate (breaths/min) 38  -CM     Posttreatment Resp Rate (breaths/min) 30  -CM     Pre SpO2 (%) 98  -CM     O2 Delivery Pre Treatment supplemental O2  3L  -CM     Intra SpO2 (%) 96  -CM     O2 Delivery Intra Treatment room air  -CM     Post SpO2 (%) 95  -CM     O2 Delivery Post Treatment room air  -CM       Row Name 02/07/25 1539          Positioning and Restraints    Pre-Treatment Position sitting in chair/recliner  -CM     Post Treatment Position chair  -CM     In Chair notified nsg;sitting;call light within reach;encouraged to call for assist;exit alarm on;with family/caregiver  -               User Key  (r) = Recorded By, (t) = Taken By, (c) = Cosigned By      Initials Name Provider Type    Jocelynn Burr, PT Physical Therapist                   Outcome Measures       Row Name 02/07/25 1158          How much help from another person do you currently  need...    Turning from your back to your side while in flat bed without using bedrails? 3  -CM     Moving from lying on back to sitting on the side of a flat bed without bedrails? 3  -CM     Moving to and from a bed to a chair (including a wheelchair)? 2  -CM     Standing up from a chair using your arms (e.g., wheelchair, bedside chair)? 3  -CM     Climbing 3-5 steps with a railing? 1  -CM     To walk in hospital room? 1  -CM     AM-PAC 6 Clicks Score (PT) 13  -CM               User Key  (r) = Recorded By, (t) = Taken By, (c) = Cosigned By      Initials Name Provider Type    Jocelynn Burr, PT Physical Therapist                                 Physical Therapy Education       Title: PT OT SLP Therapies (Done)       Topic: Physical Therapy (Done)       Point: Mobility training (Done)       Learning Progress Summary            Patient Acceptance, E,TB, VU by CM at 2/7/2025 1543   Significant Other Acceptance, E,TB, VU by CM at 2/7/2025 1543                      Point: Home exercise program (Done)       Learning Progress Summary            Patient Acceptance, E,TB, VU by CM at 2/7/2025 1543   Significant Other Acceptance, E,TB, VU by CM at 2/7/2025 1543                      Point: Body mechanics (Done)       Learning Progress Summary            Patient Acceptance, E,TB, VU by CM at 2/7/2025 1543   Significant Other Acceptance, E,TB, VU by CM at 2/7/2025 1543                      Point: Precautions (Done)       Learning Progress Summary            Patient Acceptance, E,TB, VU by CM at 2/7/2025 1543   Significant Other Acceptance, E,TB, VU by CM at 2/7/2025 1543                                      User Key       Initials Effective Dates Name Provider Type Discipline     06/16/21 -  Jocelynn Nagel, PT Physical Therapist PT                  PT Recommendation and Plan  Planned Therapy Interventions (PT): balance training, bed mobility training, gait training, home exercise program, neuromuscular re-education,  motor coordination training, patient/family education, postural re-education, strengthening, ROM (range of motion), transfer training  Outcome Evaluation: 58 yo male adm 2/3/25. Presents to [facility] w/ severe soa. Was dx w/ acute respiratory failure and required intubation. Was then transferred to [facility], where he was also dx w/ stemi, a fib w/ rvr, shock 2* distributive and cardiogenic causes, multifocal pna, hyperglycemia, (+) flu. Was intub from 2/3 to 2/5. Pt has EF of 15%. PMH: severe copd, home O2 prn 2L. At baseline, pt lives w/ wife in single level home w/ 4 stairs and handrail to enter. Pt normally ambulates short community distances only. Does not use assistive device and has no DME at home. Uses scooter in large stores. Today, pt is tolerating being weaned to room air w/ O2 sats remaining 95% (RN approved). Pt has marked proximal weakness, and he has severe soa w/ just coming to standing. Not able to progress to ambulation due to soa. Pt not safe for home and will require SNF at d/c. Will follow.     Time Calculation:   PT Evaluation Complexity  History, PT Evaluation Complexity: 3 or more personal factors and/or comorbidities  Examination of Body Systems (PT Eval Complexity): total of 4 or more elements  Clinical Presentation (PT Evaluation Complexity): evolving  Clinical Decision Making (PT Evaluation Complexity): moderate complexity  Overall Complexity (PT Evaluation Complexity): moderate complexity     PT Charges       Row Name 02/07/25 1543             Time Calculation    Start Time 1415  -CM      Stop Time 1431  -CM      Time Calculation (min) 16 min  -CM      PT Received On 02/07/25  -CM      PT - Next Appointment 02/09/25  -CM      PT Goal Re-Cert Due Date 02/21/25  -CM         Time Calculation- PT    Total Timed Code Minutes- PT 0 minute(s)  -CM                User Key  (r) = Recorded By, (t) = Taken By, (c) = Cosigned By      Initials Name Provider Type    Jocelynn Burr, PT Physical Therapist                   Therapy Charges for Today       Code Description Service Date Service Provider Modifiers Qty    [id number] HC PT EVAL MOD COMPLEXITY 4 2/7/2025 Jocelynn Nagel, PT GP 1            PT G-Codes  AM-PAC 6 Clicks Score (PT): 13  PT Discharge Summary  Anticipated Discharge Disposition (PT): skilled nursing facility    Jocelynn Nagel, PT  2/7/2025

## 2025-02-07 NOTE — PLAN OF CARE
Problem: Adult Inpatient Plan of Care  Goal: Plan of Care Review  Outcome: Progressing  Flowsheets (Taken 2/7/2025 0458)  Progress: no change  Outcome Evaluation: patient is alert and oriented x4, patient can use call light to make needs known, patient on oxygen at 5L and on and off avaps, patient continues on heparin drip, patients wife is at bedside and has been updated on events throughout this shift.  Plan of Care Reviewed With:   patient   spouse  Goal: Patient-Specific Goal (Individualized)  Outcome: Progressing  Goal: Absence of Hospital-Acquired Illness or Injury  Outcome: Progressing  Intervention: Identify and Manage Fall Risk  Recent Flowsheet Documentation  Taken 2/7/2025 0400 by Sasha Hanna RN  Safety Promotion/Fall Prevention: safety round/check completed  Taken 2/7/2025 0200 by Sasha Hanna RN  Safety Promotion/Fall Prevention: safety round/check completed  Taken 2/7/2025 0000 by Sasha Hanna RN  Safety Promotion/Fall Prevention: safety round/check completed  Taken 2/6/2025 2200 by Sasha Hanna RN  Safety Promotion/Fall Prevention: safety round/check completed  Taken 2/6/2025 2000 by Sasha Hanna RN  Safety Promotion/Fall Prevention: safety round/check completed  Intervention: Prevent Skin Injury  Recent Flowsheet Documentation  Taken 2/7/2025 0400 by Sasha Hanna RN  Body Position: sitting up in bed  Skin Protection: incontinence pads utilized  Taken 2/7/2025 0200 by Sasha Hanna RN  Body Position: sitting up in bed  Taken 2/7/2025 0000 by Sasha Hanna RN  Body Position: sitting up in bed  Skin Protection: incontinence pads utilized  Taken 2/6/2025 2200 by Sasha Hanna RN  Body Position: sitting up in bed  Taken 2/6/2025 2000 by Sasha Hanna RN  Body Position: sitting up in bed  Skin Protection: incontinence pads utilized  Intervention: Prevent Infection  Recent Flowsheet Documentation  Taken 2/7/2025 0400 by Sasha Hanna RN  Infection Prevention: environmental  surveillance performed  Taken 2/7/2025 0200 by Sasha Hanna RN  Infection Prevention: environmental surveillance performed  Taken 2/7/2025 0000 by Sasha Hanna RN  Infection Prevention: environmental surveillance performed  Taken 2/6/2025 2200 by Sasha Hanna RN  Infection Prevention: environmental surveillance performed  Taken 2/6/2025 2000 by Sasha Hanna RN  Infection Prevention: environmental surveillance performed  Goal: Optimal Comfort and Wellbeing  Outcome: Progressing  Intervention: Provide Person-Centered Care  Recent Flowsheet Documentation  Taken 2/7/2025 0400 by Sasha Hanna RN  Trust Relationship/Rapport:   care explained   choices provided   emotional support provided   empathic listening provided   questions answered   questions encouraged   reassurance provided   thoughts/feelings acknowledged  Taken 2/7/2025 0000 by Sasha Hanna RN  Trust Relationship/Rapport:   care explained   choices provided   emotional support provided   empathic listening provided   questions answered   questions encouraged   reassurance provided   thoughts/feelings acknowledged  Taken 2/6/2025 2000 by Sasha Hanna RN  Trust Relationship/Rapport:   care explained   choices provided  Goal: Readiness for Transition of Care  Outcome: Progressing     Problem: Mechanical Ventilation Invasive  Goal: Effective Communication  Outcome: Progressing  Intervention: Ensure Effective Communication  Recent Flowsheet Documentation  Taken 2/7/2025 0400 by Sasha Hanna RN  Trust Relationship/Rapport:   care explained   choices provided   emotional support provided   empathic listening provided   questions answered   questions encouraged   reassurance provided   thoughts/feelings acknowledged  Diversional Activities: television  Family/Support System Care: support provided  Communication Enhancement Strategies: call light answered in person  Taken 2/7/2025 0000 by Sasha Hanna RN  Trust Relationship/Rapport:   care  explained   choices provided   emotional support provided   empathic listening provided   questions answered   questions encouraged   reassurance provided   thoughts/feelings acknowledged  Diversional Activities: television  Family/Support System Care: support provided  Communication Enhancement Strategies: call light answered in person  Taken 2/6/2025 2000 by Sasha Hanna RN  Trust Relationship/Rapport:   care explained   choices provided  Diversional Activities: television  Family/Support System Care: support provided  Communication Enhancement Strategies: repetition utilized  Goal: Optimal Device Function  Outcome: Progressing  Intervention: Optimize Device Care and Function  Recent Flowsheet Documentation  Taken 2/7/2025 0400 by Sasha Hanna RN  Airway/Ventilation Management: airway patency maintained  Airway Safety Measures: oxygen flowmeter at bedside  Taken 2/7/2025 0200 by Sasha Hanna RN  Airway Safety Measures: oxygen flowmeter at bedside  Taken 2/7/2025 0000 by Sasha Hanna RN  Airway/Ventilation Management: airway patency maintained  Airway Safety Measures: oxygen flowmeter at bedside  Taken 2/6/2025 2200 by Sasha Hanna RN  Airway Safety Measures: oxygen flowmeter at bedside  Taken 2/6/2025 2000 by Sasha Hanna RN  Airway/Ventilation Management: airway patency maintained  Airway Safety Measures: oxygen flowmeter at bedside  Goal: Mechanical Ventilation Liberation  Outcome: Progressing  Intervention: Promote Extubation and Mechanical Ventilation Liberation  Recent Flowsheet Documentation  Taken 2/7/2025 0400 by Sasha Hanna RN  Environmental Support: calm environment promoted  Sleep/Rest Enhancement: relaxation techniques promoted  Medication Review/Management: medications reviewed  Taken 2/7/2025 0200 by Sasha Hanna RN  Medication Review/Management: medications reviewed  Taken 2/7/2025 0000 by Sasha Hanna RN  Environmental Support: calm environment promoted  Sleep/Rest  Enhancement: relaxation techniques promoted  Medication Review/Management: medications reviewed  Taken 2/6/2025 2200 by Sasha Hanna RN  Medication Review/Management: medications reviewed  Taken 2/6/2025 2000 by Sasha Hanna RN  Environmental Support: calm environment promoted  Sleep/Rest Enhancement: relaxation techniques promoted  Medication Review/Management: medications reviewed  Goal: Optimal Nutrition Delivery  Outcome: Progressing  Goal: Absence of Device-Related Skin and Tissue Injury  Outcome: Progressing  Intervention: Maintain Skin and Tissue Health  Recent Flowsheet Documentation  Taken 2/7/2025 0400 by Sasha Hanna RN  Device Skin Pressure Protection: skin-to-device areas padded  Taken 2/7/2025 0000 by Sasha Hanna RN  Device Skin Pressure Protection: skin-to-device areas padded  Taken 2/6/2025 2000 by Sasha Hanna RN  Device Skin Pressure Protection: skin-to-device areas padded  Goal: Absence of Ventilator-Induced Lung Injury  Outcome: Progressing  Intervention: Facilitate Lung-Protection Measures  Recent Flowsheet Documentation  Taken 2/7/2025 0400 by Sasha Hanna RN  Lung Protection Measures: fluid excess minimized  Taken 2/6/2025 2000 by Sasha Hanna RN  Lung Protection Measures: fluid excess minimized  Intervention: Prevent Ventilator-Associated Pneumonia  Recent Flowsheet Documentation  Taken 2/7/2025 0400 by Sasha Hanna RN  Head of Bed (HOB) Positioning:   HOB elevated   HOB at 30 degrees  Taken 2/7/2025 0200 by Sasha Hanna RN  Head of Bed (HOB) Positioning:   HOB elevated   HOB at 30 degrees  Taken 2/7/2025 0000 by Sasha Hanna RN  Head of Bed (HOB) Positioning:   HOB elevated   HOB at 30 degrees  Taken 2/6/2025 2200 by Sasha Hanna RN  Head of Bed (HOB) Positioning:   HOB elevated   HOB at 30-45 degrees  Taken 2/6/2025 2000 by Sasha Hanna RN  Head of Bed (HOB) Positioning:   HOB elevated   HOB at 30-45 degrees     Problem: Skin Injury Risk Increased  Goal:  Skin Health and Integrity  Outcome: Progressing  Intervention: Optimize Skin Protection  Recent Flowsheet Documentation  Taken 2/7/2025 0400 by Sasha Hanna RN  Activity Management: bedrest  Pressure Reduction Techniques: frequent weight shift encouraged  Head of Bed (HOB) Positioning:   HOB elevated   HOB at 30 degrees  Pressure Reduction Devices: alternating pressure pump (KENIA)  Skin Protection: incontinence pads utilized  Taken 2/7/2025 0200 by Sasha Hanna RN  Activity Management: bedrest  Head of Bed (HOB) Positioning:   HOB elevated   HOB at 30 degrees  Taken 2/7/2025 0000 by Sasha Hanna RN  Activity Management: bedrest  Pressure Reduction Techniques: frequent weight shift encouraged  Head of Bed (HOB) Positioning:   HOB elevated   HOB at 30 degrees  Pressure Reduction Devices: alternating pressure pump (KENIA)  Skin Protection: incontinence pads utilized  Taken 2/6/2025 2200 by Sasha Hanna RN  Activity Management: bedrest  Head of Bed (HOB) Positioning:   HOB elevated   HOB at 30-45 degrees  Taken 2/6/2025 2000 by Sasha Hanna RN  Activity Management: bedrest  Pressure Reduction Techniques: frequent weight shift encouraged  Head of Bed (HOB) Positioning:   HOB elevated   HOB at 30-45 degrees  Pressure Reduction Devices: alternating pressure pump (KENIA)  Skin Protection: incontinence pads utilized     Problem: Restraint, Nonviolent  Goal: Absence of Harm or Injury  Outcome: Progressing  Intervention: Implement Least Restrictive Safety Strategies  Recent Flowsheet Documentation  Taken 2/7/2025 0400 by Sasha Hanna RN  Medical Device Protection: IV pole/bag removed from visual field  Diversional Activities: television  Taken 2/7/2025 0200 by Sasha Hanna RN  Medical Device Protection: IV pole/bag removed from visual field  Taken 2/7/2025 0000 by Sasha Hanna RN  Medical Device Protection: IV pole/bag removed from visual field  Diversional Activities: television  Taken 2/6/2025 2200 by  Sasha Hanna RN  Medical Device Protection: IV pole/bag removed from visual field  Taken 2/6/2025 2000 by Sasha Hanna RN  Medical Device Protection: IV pole/bag removed from visual field  Diversional Activities: television  Intervention: Protect Dignity, Rights and Personal Wellbeing  Recent Flowsheet Documentation  Taken 2/7/2025 0400 by Sasha Hanna RN  Trust Relationship/Rapport:   care explained   choices provided   emotional support provided   empathic listening provided   questions answered   questions encouraged   reassurance provided   thoughts/feelings acknowledged  Taken 2/7/2025 0000 by Sasha Hanna RN  Trust Relationship/Rapport:   care explained   choices provided   emotional support provided   empathic listening provided   questions answered   questions encouraged   reassurance provided   thoughts/feelings acknowledged  Taken 2/6/2025 2000 by Sasha Hanna RN  Trust Relationship/Rapport:   care explained   choices provided  Intervention: Protect Skin and Joint Integrity  Recent Flowsheet Documentation  Taken 2/7/2025 0400 by Sasha Hanna RN  Body Position: sitting up in bed  Skin Protection: incontinence pads utilized  Taken 2/7/2025 0200 by Sasha Hanna RN  Body Position: sitting up in bed  Taken 2/7/2025 0000 by Sasha Hanna RN  Body Position: sitting up in bed  Skin Protection: incontinence pads utilized  Taken 2/6/2025 2200 by Sasha Hanna RN  Body Position: sitting up in bed  Taken 2/6/2025 2000 by Sasha Hanna RN  Body Position: sitting up in bed  Skin Protection: incontinence pads utilized  Goal: Absence of Harm or Injury  Outcome: Progressing  Intervention: Implement Least Restrictive Safety Strategies  Recent Flowsheet Documentation  Taken 2/7/2025 0400 by Sasha Hanna RN  Medical Device Protection: IV pole/bag removed from visual field  Diversional Activities: television  Taken 2/7/2025 0200 by Sasha Hanna RN  Medical Device Protection: IV pole/bag removed  from visual field  Taken 2/7/2025 0000 by Sasha Hanna RN  Medical Device Protection: IV pole/bag removed from visual field  Diversional Activities: television  Taken 2/6/2025 2200 by Sasha Hanna RN  Medical Device Protection: IV pole/bag removed from visual field  Taken 2/6/2025 2000 by Sasha Hanna RN  Medical Device Protection: IV pole/bag removed from visual field  Diversional Activities: television  Intervention: Protect Dignity, Rights and Personal Wellbeing  Recent Flowsheet Documentation  Taken 2/7/2025 0400 by Sasha Hanna RN  Trust Relationship/Rapport:   care explained   choices provided   emotional support provided   empathic listening provided   questions answered   questions encouraged   reassurance provided   thoughts/feelings acknowledged  Taken 2/7/2025 0000 by Sasha Hanna RN  Trust Relationship/Rapport:   care explained   choices provided   emotional support provided   empathic listening provided   questions answered   questions encouraged   reassurance provided   thoughts/feelings acknowledged  Taken 2/6/2025 2000 by Sasha Hanna RN  Trust Relationship/Rapport:   care explained   choices provided  Intervention: Protect Skin and Joint Integrity  Recent Flowsheet Documentation  Taken 2/7/2025 0400 by Sasha Hanna RN  Body Position: sitting up in bed  Skin Protection: incontinence pads utilized  Taken 2/7/2025 0200 by Sasha Hanna RN  Body Position: sitting up in bed  Taken 2/7/2025 0000 by Sasha Hanna RN  Body Position: sitting up in bed  Skin Protection: incontinence pads utilized  Taken 2/6/2025 2200 by Sasha Hanna RN  Body Position: sitting up in bed  Taken 2/6/2025 2000 by Sasha Hanna RN  Body Position: sitting up in bed  Skin Protection: incontinence pads utilized     Problem: Fall Injury Risk  Goal: Absence of Fall and Fall-Related Injury  Outcome: Progressing  Intervention: Identify and Manage Contributors  Recent Flowsheet Documentation  Taken 2/7/2025  0400 by Sasha Hanna RN  Medication Review/Management: medications reviewed  Self-Care Promotion: independence encouraged  Taken 2/7/2025 0200 by Sasha Hanna RN  Medication Review/Management: medications reviewed  Self-Care Promotion: independence encouraged  Taken 2/7/2025 0000 by Sasha Hanna RN  Medication Review/Management: medications reviewed  Self-Care Promotion: independence encouraged  Taken 2/6/2025 2200 by Sasha Hanna RN  Medication Review/Management: medications reviewed  Self-Care Promotion: independence encouraged  Taken 2/6/2025 2000 by Sasha Hanna RN  Medication Review/Management: medications reviewed  Self-Care Promotion: independence encouraged  Intervention: Promote Injury-Free Environment  Recent Flowsheet Documentation  Taken 2/7/2025 0400 by Sasha Hanna RN  Safety Promotion/Fall Prevention: safety round/check completed  Taken 2/7/2025 0200 by Sasha Hanna RN  Safety Promotion/Fall Prevention: safety round/check completed  Taken 2/7/2025 0000 by Sasha Hanna RN  Safety Promotion/Fall Prevention: safety round/check completed  Taken 2/6/2025 2200 by Sasha Hanna RN  Safety Promotion/Fall Prevention: safety round/check completed  Taken 2/6/2025 2000 by Sasha Hanna RN  Safety Promotion/Fall Prevention: safety round/check completed     Problem: Sepsis/Septic Shock  Goal: Optimal Coping  Outcome: Progressing  Intervention: Support Patient and Family Response  Recent Flowsheet Documentation  Taken 2/7/2025 0400 by Sasha Hanna RN  Supportive Measures: self-care encouraged  Family/Support System Care: support provided  Taken 2/7/2025 0000 by Sasha Hanna RN  Supportive Measures: self-care encouraged  Family/Support System Care: support provided  Taken 2/6/2025 2000 by Sasha Hanna RN  Supportive Measures: self-care encouraged  Family/Support System Care: support provided  Goal: Absence of Bleeding  Outcome: Progressing  Intervention: Monitor and Manage  Bleeding  Recent Flowsheet Documentation  Taken 2/7/2025 0400 by Sasha Hanna RN  Bleeding Precautions: blood pressure closely monitored  Taken 2/7/2025 0000 by Sasha Hanna RN  Bleeding Precautions: blood pressure closely monitored  Taken 2/6/2025 2000 by Sasha Hanna RN  Bleeding Precautions: blood pressure closely monitored  Goal: Blood Glucose Level Within Target Range  Outcome: Progressing  Intervention: Optimize Glycemic Control  Recent Flowsheet Documentation  Taken 2/7/2025 0000 by Sasha Hanna RN  Hypoglycemia Management: blood glucose monitored  Taken 2/6/2025 2000 by Sasha Hanna RN  Hypoglycemia Management: blood glucose monitored  Goal: Absence of Infection Signs and Symptoms  Outcome: Progressing  Intervention: Initiate Sepsis Management  Recent Flowsheet Documentation  Taken 2/7/2025 0400 by Sasha Hanna RN  Infection Prevention: environmental surveillance performed  Isolation Precautions: precautions maintained  Taken 2/7/2025 0200 by Sasha Hanna RN  Infection Prevention: environmental surveillance performed  Isolation Precautions: precautions maintained  Taken 2/7/2025 0000 by Sasha Hanna RN  Infection Prevention: environmental surveillance performed  Isolation Precautions: precautions maintained  Taken 2/6/2025 2200 by Sasha Hanna RN  Infection Prevention: environmental surveillance performed  Isolation Precautions: precautions maintained  Taken 2/6/2025 2000 by Sasha Hanna RN  Infection Prevention: environmental surveillance performed  Isolation Precautions: precautions maintained  Intervention: Promote Stabilization  Recent Flowsheet Documentation  Taken 2/7/2025 0400 by Sasha Hanna RN  Lung Protection Measures: fluid excess minimized  Taken 2/6/2025 2000 by Sasha Hanna RN  Lung Protection Measures: fluid excess minimized  Fever Reduction/Comfort Measures: lightweight bedding  Intervention: Promote Recovery  Recent Flowsheet Documentation  Taken 2/7/2025  0400 by Jacques, Sasha, RN  Activity Management: bedrest  Airway/Ventilation Management: airway patency maintained  Sleep/Rest Enhancement: relaxation techniques promoted  Taken 2/7/2025 0200 by Sasha Hanna RN  Activity Management: bedrest  Taken 2/7/2025 0000 by Sasha Hanna RN  Activity Management: bedrest  Airway/Ventilation Management: airway patency maintained  Sleep/Rest Enhancement: relaxation techniques promoted  Taken 2/6/2025 2200 by Sasha Hanna RN  Activity Management: bedrest  Taken 2/6/2025 2000 by Sasha Hanna RN  Activity Management: bedrest  Airway/Ventilation Management: airway patency maintained  Sleep/Rest Enhancement: relaxation techniques promoted  Goal: Optimal Nutrition Delivery  Outcome: Progressing     Problem: Enteral Nutrition  Goal: Absence of Aspiration Signs and Symptoms  Outcome: Progressing  Intervention: Minimize Aspiration Risk  Recent Flowsheet Documentation  Taken 2/7/2025 0400 by Sasha Hanna RN  Head of Bed (HOB) Positioning:   HOB elevated   HOB at 30 degrees  Taken 2/7/2025 0200 by Sasha Hanna RN  Head of Bed (HOB) Positioning:   HOB elevated   HOB at 30 degrees  Taken 2/7/2025 0000 by Sasha Hanna RN  Head of Bed (HOB) Positioning:   HOB elevated   HOB at 30 degrees  Taken 2/6/2025 2200 by Sasha Hanna RN  Head of Bed (HOB) Positioning:   HOB elevated   HOB at 30-45 degrees  Taken 2/6/2025 2000 by Sasha Hanna RN  Head of Bed (HOB) Positioning:   HOB elevated   HOB at 30-45 degrees  Goal: Safe, Effective Therapy Delivery  Outcome: Progressing  Goal: Feeding Tolerance  Outcome: Progressing     Problem: Noninvasive Ventilation Acute  Goal: Effective Unassisted Ventilation and Oxygenation  Outcome: Progressing  Intervention: Monitor and Manage Noninvasive Ventilation  Recent Flowsheet Documentation  Taken 2/7/2025 0400 by Sasha Hanna RN  Airway/Ventilation Management: airway patency maintained  Taken 2/7/2025 0000 by Sasha Hanna  RN  Airway/Ventilation Management: airway patency maintained  Taken 2/6/2025 2000 by Sasha Hanna RN  Airway/Ventilation Management: airway patency maintained   Goal Outcome Evaluation:  Plan of Care Reviewed With: patient, spouse        Progress: no change  Outcome Evaluation: patient is alert and oriented x4, patient can use call light to make needs known, patient on oxygen at 5L and on and off avaps, patient continues on heparin drip, patients wife is at bedside and has been updated on events throughout this shift.

## 2025-02-07 NOTE — PROGRESS NOTES
Cardiology Progress  Note      Patient Care Team:  Deanne Perrin APRN as PCP - General (Nurse Practitioner)  Jm Gaona MD as Consulting Physician (Pulmonary Disease)    PATIENT IDENTIFICATION  Name: Chu Peralta  Age: 59 y.o.  Sex: male  :  1965  MRN: [id number]      Length of stay:    LOS: 4 days           REASON FOR FOLLOW-UP:  Acute inferior wall ST elevation MI  Severe single vessel coronary artery disease  Severe LV dysfunction        INTERVAL HISTORY  Patient seen and examined, chart and labs reviewed.  Patient is asleep upon entry, on BiPAP.  Wife at bedside.  A-fib RVR yesterday and was on IV Amio drip.  This was transitioned to IV digoxin load per protocol.  He has converted to sinus rhythm currently at 97.  Pressure stable at 139/81.  Drips: IV heparin, maintenance.  No edema.  No other events reported overnight    SUBJECTIVE    A complete review of systems was obtained with pertinent findings listed in interval history.  All other systems are negative.      REVIEW OF SYSTEMS:  Review of Systems   Constitutional: Positive for malaise/fatigue.   All other systems reviewed and are negative.       OBJECTIVE   Hemoglobin 10.6/hematocrit 33.5      ASSESSMENT  Acute inferior wall ST elevation MI status post PCI-RCA  Atrial fibrillation with rapid ventricular response-currently sinus rhythm  Severe global LV systolic dysfunction/ischemic dilated cardiomyopathy  Acute respiratory failure requiring endotracheal intubation  Acute on chronic COPD exacerbation  Influenza A infection  History of chronic hypoxic respiratory failure on home O2  Hypotension requiring vasopressor support  Peripheral arterial disease-severe iliofemoral disease bilaterally  Heart failure with reduced ejection fraction  Shock-distributive/cardiovascular      RECOMMENDATIONS  Patient tolerating BiPAP with plans to transition to Airvo as tolerated.  Continue uninterrupted dual antiplatelet therapy consisting of aspirin and  "iWllilinzahida.  Unable to initiate goal-directed medical therapy due to hypotension.  No longer requiring vasopressor support.  Continue statin.  Monitor and replete electrolytes per protocol.  Continue close monitoring.  Remains on IV heparin.          CHF Guideline Directed Medical Therapy  Beta Blocker: Metoprolol succinate 25 mg daily  ARNI/ACE/ARB: Entresto 24-26 mg twice daily-beginning tomorrow  SGLT 2 inhibitors: Jardiance 10 mg daily  Diuretics:   Aldosterone Antagonist:   Vasodilators & Nitrates:       Vital Signs  Visit Vitals  /78   Pulse 98   Temp 98.3 °F (36.8 °C) (Oral)   Resp 27   Ht 175.3 cm (69\")   Wt 75.3 kg (166 lb 0.1 oz)   SpO2 97%   BMI 24.51 kg/m²     Oxygen Therapy  SpO2: 97 %  Pulse Oximetry Type: Continuous  Device (Oxygen Therapy): NPPV/NIV  Flow (L/min) (Oxygen Therapy): 3  Oxygen Concentration (%): 35  Flowsheet Rows      Flowsheet Row First Filed Value   Admission Height 172.7 cm (68\") Documented at 02/03/2025 1651   Admission Weight 76 kg (167 lb 8.8 oz) Documented at 02/03/2025 1651          Intake & Output (last 3 days)         02/01 0701  02/02 0700 02/02 0701  02/03 0700 02/03 0701  02/04 0700 02/04 0701  02/05 0700    P.O.   0     I.V. (mL/kg)   1295 (17.2)     Total Intake(mL/kg)   1295 (17.2)     Urine (mL/kg/hr)   900     Emesis/NG output   0     Stool   0     Total Output   900     Net   +395             Urine Unmeasured Occurrence   0 x     Stool Unmeasured Occurrence   0 x     Emesis Unmeasured Occurrence   0 x           Lines, Drains & Airways       Active LDAs       Name Placement date Placement time Site Days    CVC Triple Lumen 02/04/25 Right Internal jugular 02/04/25  0400  Internal jugular  less than 1    Peripheral IV 02/03/25 1652 Distal;Left;Posterior Forearm 02/03/25  1652  Forearm  less than 1    Peripheral IV 02/03/25 1655 Anterior;Distal;Right;Upper Arm 02/03/25  1655  Arm  less than 1    NG/OG Tube Nasogastric 16 Fr Left nostril 02/03/25  1734  Left nostril  " "less than 1    Urethral Catheter 02/04/25  0500  -- less than 1    Hi-Lo Evac ETT 8 02/03/25  --  Oral  1    Arterial Line 02/04/25 Right Radial 02/04/25  0400  Radial  less than 1    Arterial Sheath 6 Fr. Right Femoral 02/03/25  1904  Femoral  less than 1    Venous Sheath 6 Fr. Right Femoral 02/03/25  1903  Femoral  less than 1                           /78   Pulse 98   Temp 98.3 °F (36.8 °C) (Oral)   Resp 27   Ht 175.3 cm (69\")   Wt 75.3 kg (166 lb 0.1 oz)   SpO2 97%   BMI 24.51 kg/m²   Intake/Output last 3 shifts:  I/O last 3 completed shifts:  In: 1191 [P.O.:370; I.V.:503; NG/GT:318]  Out: 2415 [Urine:2415]  Intake/Output this shift:  I/O this shift:  In: 700 [P.O.:700]  Out: -     PHYSICAL EXAM:    General: Well-developed, well-nourished, critically ill 59-year-old male who is awake, following commands, cooperative  Head:  Normocephalic, atraumatic, mucous membranes moist, endotracheal tube noted  Eyes:  Conjunctivae/corneas clear     Neck:  Supple,  no adenopathy; no JVD or bruit  Lungs: Coarse in bases  Chest wall: No tenderness  Heart::  Regular rate and rhythm, tachycardic, S1 and S2 normal, no murmur, rub or gallop  Abdomen: Soft, nondistended, bowel sounds active  Extremities: No cyanosis, clubbing, or edema   Pulses: 2+ and symmetric all extremities  Skin:  No rashes or lesions  Neuro/psych: Alert and oriented.  Cranial nerves II through XII are grossly intact        Scheduled Meds:      amiodarone, 200 mg, Oral, Q12H  aspirin, 81 mg, Nasogastric, Daily  atorvastatin, 40 mg, Nasogastric, Nightly  budesonide, 0.5 mg, Nebulization, BID - RT  chlorhexidine, 15 mL, Mouth/Throat, Q12H  empagliflozin, 10 mg, Oral, Daily  enoxaparin, 1 mg/kg, Subcutaneous, Q12H  escitalopram, 20 mg, Nasogastric, Daily  insulin lispro, 4-24 Units, Subcutaneous, Q6H  lansoprazole, 30 mg, Nasogastric, Q AM  [START ON 2/8/2025] lisinopril, 5 mg, Oral, Daily  methylPREDNISolone sodium succinate, 40 mg, Intravenous, " Daily  metoprolol succinate XL, 25 mg, Oral, Daily  mupirocin, 1 Application, Each Nare, BID  senna-docusate sodium, 2 tablet, Oral, BID   And  [START ON 2/8/2025] polyethylene glycol, 17 g, Oral, Daily  sodium chloride, 10 mL, Intravenous, Q12H  ticagrelor, 90 mg, Nasogastric, BID        Continuous Infusions:             PRN Meds:      acetaminophen    aluminum-magnesium hydroxide-simethicone    senna-docusate sodium **AND** [START ON 2/8/2025] polyethylene glycol **AND** bisacodyl **AND** bisacodyl    dextrose    dextrose    diphenhydrAMINE    glucagon (human recombinant)    ipratropium-albuterol    nicotine    nitroglycerin    ondansetron ODT **OR** ondansetron    sodium chloride    sodium chloride        Results Review:     I reviewed the patient's new clinical results.    CBC    Results from last 7 days   Lab Units 02/07/25  0420 02/06/25  0310 02/05/25  1836 02/05/25 0415 02/04/25 0518 02/03/25 2035 02/03/25  1716   WBC 10*3/mm3 8.38 12.39*  --  18.99* 18.70*  --  25.64*   HEMOGLOBIN g/dL 10.6* 10.4*  --  10.5* 12.0*  --  12.6*   HEMOGLOBIN, POC g/dL  --   --  13.3  --   --  13.2  --    PLATELETS 10*3/mm3 230 239  --  283 330  --  336     Cr Clearance Estimated Creatinine Clearance: 150.2 mL/min (A) (by C-G formula based on SCr of 0.55 mg/dL (L)).  Coag   Results from last 7 days   Lab Units 02/07/25  0813 02/06/25  2358 02/06/25  1609 02/06/25  0938 02/06/25  0310 02/05/25 0415 02/04/25 0518 02/03/25  1716   INR   --   --   --  1.05  --   --   --  1.21*   APTT seconds 51.0* 60.6* 102.7* 35.2 38.8* 37.2* 32.9 >200.0*     HbA1C   Lab Results   Component Value Date    HGBA1C 6.06 (H) 02/03/2025     Blood Glucose   Glucose   Date/Time Value Ref Range Status   02/07/2025 0756 92 70 - 105 mg/dL Final     Comment:     Serial Number: [id number]:  [id number]   02/06/2025 2353 97 70 - 105 mg/dL Final     Comment:     Serial Number: [id number]:  [id number]   02/06/2025 1738 98 70 - 105 mg/dL Final      Comment:     Serial Number: [id number]:  [id number]   02/06/2025 1148 96 70 - 105 mg/dL Final     Comment:     Serial Number: [id number]:  [id number]   02/06/2025 0546 106 (H) 70 - 105 mg/dL Final     Comment:     Serial Number: [id number]:  [id number]   02/05/2025 2321 99 70 - 105 mg/dL Final     Comment:     Serial Number: [id number]:  [id number]   02/05/2025 2000 151 (H) 74 - 100 mg/dL Final     Comment:     Serial Number: [id number]:  [id number]   02/05/2025 1836 246 (H) 74 - 100 mg/dL Final     Comment:     Serial Number: [id number]:  [id number]   02/05/2025 1836 246 (H) 74 - 100 mg/dL Final     Infection   Results from last 7 days   Lab Units 02/03/25  1806 02/03/25  1716   BLOODCX  No growth at 3 days No growth at 3 days   PROCALCITONIN ng/mL  --  37.40*     CMP   Results from last 7 days   Lab Units 02/07/25  0420 02/06/25  0310 02/05/25  2144 02/05/25  1836 02/05/25  0415 02/04/25  0518 02/03/25  2320 02/03/25 2035 02/03/25  1716   SODIUM mmol/L 139 139 139  --  138 139  --   --  135*   POTASSIUM mmol/L 4.3 4.6 4.7  --  3.8 4.7 5.7*  --  5.1   CHLORIDE mmol/L 103 103 103  --  106 105  --   --  101   CO2 mmol/L 30.6* 28.0 28.6  --  24.0 21.1*  --   --  25.4   BUN mg/dL 27* 24* 21  --  18 20  --   --  11   CREATININE mg/dL 0.55* 0.62* 0.64* 0.82 0.50* 1.02  --  1.19 0.91   GLUCOSE mg/dL 82 96 115*  --  153* 209*  --   --  186*   ALBUMIN g/dL 2.8* 3.0*  --   --  3.1* 3.8  --   --  4.0   BILIRUBIN mg/dL 0.2 <0.2  --   --  <0.2 <0.2  --   --  0.2   ALK PHOS U/L 50 51  --   --  48 58  --   --  66   AST (SGOT) U/L 81* 124*  --   --  169* 183*  --   --  84*   ALT (SGPT) U/L 38 50*  --   --  56* 49*  --   --  30   AMYLASE U/L  --   --   --   --   --   --   --   --  64   LIPASE U/L  --   --   --   --   --   --   --   --  20     ABG    Results from last 7 days   Lab Units 02/05/25  2000 02/05/25  1836 02/05/25  0336 02/04/25  0733 02/04/25  0250 02/03/25  0295 02/03/25  9428   PH, ARTERIAL pH  "units 7.360 7.180* 7.478* 7.358 7.295* 7.160* 7.123*   PCO2, ARTERIAL mm Hg 56.8* 74.7* 37.2 42.9 49.1* 65.0* 78.0*   PO2 ART mm Hg 106.8 201.2* 120.0* 180.0* 97.0 143.1* 74.5*   O2 SATURATION ART % 97.7 99.4* 99.0* 99.5* 96.6 98.3* 88.0*   BASE EXCESS ART mmol/L 5.1* -2.2* 3.9* -1.4* -3.1* -6.5* -5.7*     UA    Results from last 7 days   Lab Units 02/03/25  1730   NITRITE UA  Negative   WBC UA /HPF 0-2   BACTERIA UA /HPF None Seen   SQUAM EPITHEL UA /HPF None Seen     LILIANE  No results found for: \"POCMETH\", \"POCAMPHET\", \"POCBARBITUR\", \"POCBENZO\", \"POCCOCAINE\", \"POCOPIATES\", \"POCOXYCODO\", \"POCPHENCYC\", \"POCPROPOXY\", \"POCTHC\", \"POCTRICYC\"  Lysis Labs   Results from last 7 days   Lab Units 02/07/25  0813 02/07/25  0420 02/06/25  2358 02/06/25  1609 02/06/25  0938 02/06/25  0310 02/05/25  2144 02/05/25  1836 02/05/25  0415 02/04/25  0518 02/03/25  2035 02/03/25  1716   INR   --   --   --   --  1.05  --   --   --   --   --   --  1.21*   APTT seconds 51.0*  --  60.6* 102.7* 35.2 38.8*  --   --  37.2* 32.9  --  >200.0*   HEMOGLOBIN g/dL  --  10.6*  --   --   --  10.4*  --   --  10.5* 12.0*  --  12.6*   HEMOGLOBIN, POC g/dL  --   --   --   --   --   --   --  13.3  --   --  13.2  --    PLATELETS 10*3/mm3  --  230  --   --   --  239  --   --  283 330  --  336   CREATININE mg/dL  --  0.55*  --   --   --  0.62* 0.64* 0.82 0.50* 1.02 1.19 0.91     Radiology(recent) XR Chest 1 View    Result Date: 2/7/2025  Impression: No significant interval change. Electronically Signed: Rk Medina MD  2/7/2025 7:57 AM EST  Workstation ID: [id number]    XR Chest 1 View    Result Date: 2/6/2025  Impression: No significant interval change. Electronically Signed: Alvaro Loera MD  2/6/2025 7:58 AM EST  Workstation ID: [id number]    XR Chest 1 View    Result Date: 2/5/2025  Impression: 1.Interval extubation. 2.Persistent diffuse interstitial opacities with perihilar and left midlung airspace opacities. Electronically Signed: Alvaro Loera MD  2/5/2025 " 7:08 PM EST  Workstation ID: [id number]       Results from last 7 days   Lab Units 02/05/25 2000 02/04/25 0518 02/03/25  1716   CK TOTAL U/L  --   --  1,076*   HSTROP T ng/L 1,957*   < > 1,288*    < > = values in this interval not displayed.       X-rays, labs reviewed personally by physician.    ECG/EMG Results (most recent)       Procedure Component Value Units Date/Time    ECG 12 Lead Other; elevated troponin [[id number]] Collected: 02/03/25 1739     Updated: 02/03/25 1740     QT Interval 317 ms      QTC Interval 440 ms     Narrative:      HEART TBUP=633  bpm  RR Lbyjgzgz=723  ms  SD Omgenuvu=437  ms  P Horizontal Axis=6  deg  P Front Axis=74  deg  QRSD Interval=84  ms  QT Lttvysjw=760  ms  TJrP=384  ms  QRS Axis=69  deg  T Wave Axis=60  deg  - ABNORMAL ECG -  Sinus tachycardia  Ventricular premature complex  Low voltage, precordial leads  Probable anteroseptal infarct, old  ST elevation, consider inferior injury  Date and Time of Study:2025-02-03 17:39:35    Adult Transthoracic Echo Complete w/ Color, Spectral and Contrast if Necessary Per Protocol [[id number]] Resulted: 02/03/25 2054     Updated: 02/03/25 2054     LVIDd 4.7 cm      LVIDs 4.1 cm      IVSd 1.00 cm      LVPWd 1.00 cm      FS 12.8 %      IVS/LVPW 1.00 cm      ESV(cubed) 68.9 ml      EDV(cubed) 103.8 ml      LV mass(C)d 164.5 grams      LVOT area 2.8 cm2      LVOT diam 1.90 cm      EDV(MOD-sp4) 124.0 ml      ESV(MOD-sp4) 90.1 ml      SV(MOD-sp4) 33.9 ml      EF(MOD-sp4) 27.3 %      MV E max trevon 42.3 cm/sec      MV A max trevon 62.0 cm/sec      MV dec time 0.14 sec      MV E/A 0.68     Med Peak E' Trevon 5.3 cm/sec      Lat Peak E' Trevon 5.2 cm/sec      TR max trevon 261.0 cm/sec      Avg E/e' ratio 8.06     SV(LVOT) 24.0 ml      TAPSE (>1.6) 1.56 cm      RV S' 12.3 cm/sec      LA dimension (2D)  3.3 cm      LV V1 max 57.9 cm/sec      LV V1 max PG 1.34 mmHg      LV V1 mean PG 1.00 mmHg      LV V1 VTI 8.5 cm      Ao pk trevon 88.2 cm/sec      Ao max PG 3.1 mmHg       Ao mean PG 2.00 mmHg      Ao V2 VTI 13.7 cm      EFREM(I,D) 1.75 cm2      AI P1/2t 374.7 msec      MV max PG 1.54 mmHg      MV mean PG 1.00 mmHg      MV V2 VTI 8.2 cm      MV P1/2t 16.1 msec      MVA(P1/2t) 13.7 cm2      MVA(VTI) 2.9 cm2      MV dec slope 1,020 cm/sec2      MR max donis 215.0 cm/sec      MR max PG 18.5 mmHg      TR max PG 27.2 mmHg      RVSP(TR) 30.2 mmHg      RAP systole 3.0 mmHg      RV V1 max PG 0.58 mmHg      RV V1 max 38.1 cm/sec      RV V1 VTI 4.6 cm      PA V2 max 57.6 cm/sec      PA acc time 0.12 sec      ACS 2.30 cm      Sinus 3.7 cm      EF(MOD-bp) 27.0 %      Dimensionless Index 0.72 (DI)     Narrative:        Left ventricular systolic function is severely decreased. Left   ventricular ejection fraction appears to be 26 - 30%.    Left ventricular diastolic function is consistent with (grade I)   impaired relaxation.    Moderately reduced right ventricular systolic function noted.    The right ventricular cavity is mildly dilated.    Estimated right ventricular systolic pressure from tricuspid   regurgitation is normal (<35 mmHg). Calculated right ventricular systolic   pressure from tricuspid regurgitation is 30 mmHg.      ECG 12 Lead Pre-Op / Pre-Procedure [[id number]] Collected: 02/04/25 0526     Updated: 02/04/25 0528     QT Interval 312 ms      QTC Interval 425 ms     Narrative:      HEART WYMT=692  bpm  RR Apqwfbvj=039  ms  IL Cmowbgme=218  ms  P Horizontal Axis=-16  deg  P Front Axis=82  deg  QRSD Interval=81  ms  QT Divwdmpx=797  ms  AMsB=889  ms  QRS Axis=76  deg  T Wave Axis=72  deg  - ABNORMAL ECG -  Sinus tachycardia  Probable inferior infarct, recent  Consider anterior infarct  Date and Time of Study:2025-02-04 05:26:28    Telemetry Scan [[id number]] Resulted: 02/03/25     Updated: 02/04/25 0912    Telemetry Scan [[id number]] Resulted: 02/03/25     Updated: 02/04/25 0942    Telemetry Scan [[id number]] Resulted: 02/03/25     Updated: 02/04/25 1117    Telemetry Scan [[id number]]  Resulted: 02/03/25     Updated: 02/04/25 1229    Telemetry Scan [[id number]] Resulted: 02/03/25     Updated: 02/04/25 1429    Telemetry Scan [[id number]] Resulted: 02/03/25     Updated: 02/04/25 1631    Telemetry Scan [[id number]] Resulted: 02/03/25     Updated: 02/05/25 0829    Telemetry Scan [[id number]] Resulted: 02/03/25     Updated: 02/05/25 0837    Telemetry Scan [[id number]] Resulted: 02/03/25     Updated: 02/05/25 0850    Telemetry Scan [[id number]] Resulted: 02/03/25     Updated: 02/05/25 0856    Telemetry Scan [[id number]] Resulted: 02/03/25     Updated: 02/05/25 1247    Telemetry Scan [[id number]] Resulted: 02/03/25     Updated: 02/05/25 1604    Telemetry Scan [[id number]] Resulted: 02/03/25     Updated: 02/06/25 0145    Telemetry Scan [[id number]] Resulted: 02/03/25     Updated: 02/06/25 0427    Telemetry Scan [[id number]] Resulted: 02/03/25     Updated: 02/06/25 0712    Telemetry Scan [[id number]] Resulted: 02/03/25     Updated: 02/06/25 0755    Telemetry Scan [[id number]] Resulted: 02/03/25     Updated: 02/06/25 0915    Telemetry Scan [[id number]] Resulted: 02/03/25     Updated: 02/06/25 1346    Telemetry Scan [[id number]] Resulted: 02/03/25     Updated: 02/06/25 1630    ECG 12 Lead Drug Monitoring; Amiodarone [[id number]] Collected: 02/06/25 0519     Updated: 02/06/25 1658     QT Interval 369 ms      QTC Interval 502 ms     Narrative:      HEART HIYQ=328  bpm  RR Kygybuse=176  ms  WI Interval=  ms  P Horizontal Axis=  deg  P Front Axis=  deg  QRSD Dsoqxfmy=544  ms  QT Ucfzojor=839  ms  KLbX=914  ms  QRS Axis=93  deg  T Wave Axis=-35  deg  - ABNORMAL ECG -  Atrial fibrillation  RBBB and LPFB  Prolonged QT interval  When compared with ECG of 05-Feb-2025 23:28:11,  No significant change  Electronically Signed By: Jg Harris ([facility]) 2025-02-06 16:48:52  Date and Time of Study:2025-02-06 05:19:06    ECG 12 Lead QT Measurement [[id number]] Collected: 02/05/25 2328     Updated: 02/06/25 1658     QT Interval 359 ms      QTC  Interval 515 ms     Narrative:      HEART UEYN=010  bpm  RR Qtylglkx=711  ms  OH Interval=  ms  P Horizontal Axis=  deg  P Front Axis=  deg  QRSD Bbhyezet=105  ms  QT Fdjqrkqi=512  ms  XYvJ=814  ms  QRS Axis=93  deg  T Wave Axis=-77  deg  - ABNORMAL ECG -  Atrial fibrillation  RBBB and LPFB  Prolonged QT interval  When compared with ECG of 05-Feb-2025 19:36:58,  Significant repolarization change  Significant axis, voltage or hypertrophy change  Electronically Signed By: Jg Harris ([facility]) 2025-02-06 16:49:07  Date and Time of Study:2025-02-05 23:28:11    ECG 12 Lead Drug Monitoring; Amiodarone [[id number]] Collected: 02/05/25 1936     Updated: 02/06/25 1659     QT Interval 352 ms      QTC Interval 530 ms     Narrative:      HEART NLLX=728  bpm  RR Tpugwfbh=531  ms  OH Interval=  ms  P Horizontal Axis=  deg  P Front Axis=  deg  QRSD Svaqgpcu=558  ms  QT Gtyaurtb=581  ms  IYaR=209  ms  QRS Axis=98  deg  T Wave Axis=-70  deg  - ABNORMAL ECG -  Atrial fibrillation  Nonspecific  intraventricular conduction delay  Low voltage, precordial leads  Nonspecific  T abnormalities, inferior leads  Prolonged QT interval  When compared with ECG of 05-Feb-2025 18:20:06,  Significant change in rhythm: previously sinus  Significant repolarization change  Significant axis, voltage or hypertrophy change  Electronically Signed By: Jg Harris ([facility]) 2025-02-06 16:49:13  Date and Time of Study:2025-02-05 19:36:58    ECG 12 Lead Rhythm Change [[id number]] Collected: 02/05/25 1820     Updated: 02/06/25 1659     QT Interval 302 ms      QTC Interval 470 ms     Narrative:      HEART EBNS=289  bpm  RR Lgpccbov=882  ms  OH Pgxdxbla=994  ms  P Horizontal Axis=-24  deg  P Front Axis=Invalid  deg  QRSD Btrxppno=817  ms  QT Najbqztb=316  ms  WOtF=486  ms  QRS Axis=93  deg  T Wave Axis=-75  deg  - ABNORMAL ECG -  Sinus tachycardia  Ventricular premature complex  Lateral  infarct, age indeterminate  Abnormal T, consider ischemia, inferior leads  When  compared with ECG of 04-Feb-2025 05:26:28,  Significant rate increase  New or worsened ischemia or infarction  Electronically Signed By: Jg Harris ([facility]) 2025-02-06 16:51:19  Date and Time of Study:2025-02-05 18:20:06    ECG 12 Lead Chest Pain [[id number]] Collected: 02/05/25 1818     Updated: 02/06/25 1659     QT Interval 309 ms      QTC Interval 460 ms     Narrative:      HEART ZEQU=067  bpm  RR Eyikshzx=635  ms  SC Interval=45  ms  P Horizontal Axis=-42  deg  P Front Axis=Invalid  deg  QRSD Ystxtmtw=819  ms  QT Cmogivtw=218  ms  GOmB=940  ms  QRS Axis=92  deg  T Wave Axis=-74  deg  - ABNORMAL ECG -  Sinus tachycardia  Multiform ventricular premature complexes  Nonspecific  intraventricular conduction delay  ST elevation, consider lateral injury  When compared with ECG of 04-Feb-2025 05:26:28,  Significant rate increase  New conduction abnormality  Electronically Signed By: Jg Harris ([facility]) 2025-02-06 16:51:28  Date and Time of Study:2025-02-05 18:18:27    ECG 12 Lead QT Measurement [[id number]] Collected: 02/06/25 1715     Updated: 02/06/25 1716     QT Interval 358 ms      QTC Interval 512 ms     Narrative:      HEART GJXK=319  bpm  RR Xypbczjq=465  ms  SC Interval=  ms  P Horizontal Axis=  deg  P Front Axis=  deg  QRSD Mhqknmmw=301  ms  QT Cpogzhfu=394  ms  BBzC=927  ms  QRS Axis=94  deg  T Wave Axis=-39  deg  - ABNORMAL ECG -  Pacemaker spikes or artifacts  Atrial fibrillation  RBBB and LPFB  Borderline ST elevation, inferior leads  Prolonged QT interval  Date and Time of Study:2025-02-06 17:15:43    Telemetry Scan [[id number]] Resulted: 02/03/25     Updated: 02/07/25 0758    Telemetry Scan [[id number]] Resulted: 02/03/25     Updated: 02/07/25 0857    Telemetry Scan [[id number]] Resulted: 02/03/25     Updated: 02/07/25 0902              Medication Review:   I have reviewed the patient's current medication list  Scheduled Meds:amiodarone, 200 mg, Oral, Q12H  aspirin, 81 mg, Nasogastric, Daily  atorvastatin, 40  mg, Nasogastric, Nightly  budesonide, 0.5 mg, Nebulization, BID - RT  chlorhexidine, 15 mL, Mouth/Throat, Q12H  empagliflozin, 10 mg, Oral, Daily  enoxaparin, 1 mg/kg, Subcutaneous, Q12H  escitalopram, 20 mg, Nasogastric, Daily  insulin lispro, 4-24 Units, Subcutaneous, Q6H  lansoprazole, 30 mg, Nasogastric, Q AM  [START ON 2/8/2025] lisinopril, 5 mg, Oral, Daily  methylPREDNISolone sodium succinate, 40 mg, Intravenous, Daily  metoprolol succinate XL, 25 mg, Oral, Daily  mupirocin, 1 Application, Each Nare, BID  senna-docusate sodium, 2 tablet, Oral, BID   And  [START ON 2/8/2025] polyethylene glycol, 17 g, Oral, Daily  sodium chloride, 10 mL, Intravenous, Q12H  ticagrelor, 90 mg, Nasogastric, BID      Continuous Infusions:       PRN Meds:.  acetaminophen    aluminum-magnesium hydroxide-simethicone    senna-docusate sodium **AND** [START ON 2/8/2025] polyethylene glycol **AND** bisacodyl **AND** bisacodyl    dextrose    dextrose    diphenhydrAMINE    glucagon (human recombinant)    ipratropium-albuterol    nicotine    nitroglycerin    ondansetron ODT **OR** ondansetron    sodium chloride    sodium chloride    Imaging:  Imaging Results (Last 72 Hours)       Procedure Component Value Units Date/Time    XR Chest 1 View [[id number]] Collected: 02/07/25 0756     Updated: 02/07/25 0759    Narrative:      XR CHEST 1 VW    Date of Exam: 2/7/2025 7:49 AM EST    Indication: Heart failure/hypoxia    Comparison: Chest radiograph 2/6/2025.    Findings:  Removal of nasogastric tube. Unchanged position of right IJ approach central venous catheter. Cardiomediastinal silhouette is unchanged. Similar scattered airspace opacities, predominantly in the left lung. No sizable pleural effusion. No pneumothorax.   Osseous structures are unchanged.      Impression:      Impression:  No significant interval change.      Electronically Signed: Rk Medina MD    2/7/2025 7:57 AM EST    Workstation ID: [id number]    XR Chest 1 View [[id number]]  Collected: 02/06/25 0757     Updated: 02/06/25 0800    Narrative:      XR CHEST 1 VW    Date of Exam: 2/6/2025 5:49 AM EST    Indication: acute on chronic hypoxic respiratory failure    Comparison: 2/5/2025    Findings:  Enteric tube and right approach central venous catheter in unchanged position. Unchanged cardiomediastinal silhouette. Persistent diffuse interstitial opacities with perihilar and left midlung airspace opacities. No new focal airspace consolidation. No   pleural effusion or pneumothorax. No acute bone abnormality.      Impression:      Impression:  No significant interval change.      Electronically Signed: Alvaro Loera MD    2/6/2025 7:58 AM EST    Workstation ID: [id number]    XR Chest 1 View [[id number]] Collected: 02/05/25 1906     Updated: 02/05/25 1910    Narrative:      XR CHEST 1 VW    Date of Exam: 2/5/2025 6:45 PM EST    Indication: Shortness of breath    Comparison: Same day radiographs    Findings:  Interval extubation. Enteric tube distal tip is excluded by collimation. Unchanged right approach central venous catheter. Unchanged cardiomediastinal silhouette. There are persistent diffuse interstitial opacities with perihilar and left midlung   airspace opacities. No evidence of pleural effusion. No pneumothorax. Osseous structures are unchanged.      Impression:      Impression:  1.Interval extubation.  2.Persistent diffuse interstitial opacities with perihilar and left midlung airspace opacities.        Electronically Signed: Alvaro Loera MD    2/5/2025 7:08 PM EST    Workstation ID: [id number]    XR Chest 1 View [[id number]] Collected: 02/05/25 0715     Updated: 02/05/25 0718    Narrative:      XR CHEST 1 VW    Date of Exam: 2/5/2025 4:45 AM EST    Indication: acute on chronic hypoxic respiratory failure    Comparison: 2/4/2025    Findings:  ET tube tip above the jose guadalupe. Esophagogastric tube tip below the diaphragm. Right IJ central venous catheter tip is at the low SVC. The lungs are  "without consolidation. There is no pneumothorax or pleural effusion.      Impression:      Impression:  1. Lines and support tubes in expected location.  2. No pneumothorax.  3. Clear lungs.          Electronically Signed: Celso Cox MD    2/5/2025 7:16 AM EST    Workstation ID: [id number]              ROLA Hendricks  02/07/25  10:17 EST       EMR Dragon/Transcription:   \"Dictated utilizing Dragon dictation\".       Electronically signed by ROLA Hendricks, 02/07/25, 10:17 AM EST.        Copied text in this note has been reviewed by me and is accurate as of 02/07/25.    Cardiology attending:  Seen and examined.  Chart and labs reviewed. Independent interpretations of cardiac testing was performed. History and exam findings are verified with above changes noted.  Assessment and plan notated by APC after being formulated by attending consultant.  Note that greater than 50% of the time spent in care of the patient was provided by attending consultant.    Patient doing well today.  Getting up bedside chair.  Patient voided last night after Mims catheter attempt.  No chest pain.  He is in good spirits.  Family at bedside.  Recommend continued advancement of his activity.  Titrate goal-directed therapy as his pressure allows.    Physical Exam:    General: Alert, cooperative, no distress, appears stated age  Head:  Normocephalic, atraumatic, mucous membranes moist  Eyes:  Conjunctivae/corneas clear, EOM's intact     Neck:  Supple,  no bruit  Lungs:           Coarse and diminished bilaterally  Chest wall: No tenderness  Heart::  Regular rate and rhythm, S1 and S2 normal, 1/6 holosystolic murmur.  No rub or gallop  Abdomen: Soft, nontender, nondistended bowel sounds active.  Groin soft no hematoma  Extremities: No cyanosis, clubbing, or edema  Pulses: Diminished pedal pulses  Skin:  No rashes or lesions  Neuro/psych: Alert and appropriate    "

## 2025-02-07 NOTE — CONSULTS
"    [facility] MEDICINE SERVICE  TRANSFER OF CARE/ACCEPTANCE NOTE    PATIENT NAME: Chu Peralta  : 1965  MRN: [id number]     Active Hospital Problems    Diagnosis  POA    **Acute on chronic respiratory failure [J96.20]  Yes    STEMI involving right coronary artery [I21.11]  Unknown      Resolved Hospital Problems   No resolved problems to display.       Interim History:  Per HPI on 25:  \"Chu Peralta is a 59 y.o. male with PMH of COPD, Chronic hypoxic respiratory failure on home 2 L, tobacco use disorder, RLS who presented to the hospital for respiratory distress and was admitted on 2/3/2025  4:39 PM with a principal diagnosis of Acute on chronic respiratory failure.       Patient was initially brought into an outside hospital and was in respiratory distress.  Placed on BiPAP.  Initially was DNI however rescinded the DNR and was subsequently intubated.  Patient was being treated with antibiotics for multifocal pneumonia as well as influenza A.  Patient was given broad-spectrum antibiotics and transferred to [facility] for further care.     Upon arrival to [facility] the patient was noted to have concerning EKG for acute coronary syndrome and went emergently to the Cath Lab with cardiology.  He had an RCA occlusion with 1 stent placed.  Postoperative EF was around 15%.  Patient was admitted to the ICU for further care.        Chu Peralta is now Hospital Day: 5\"    Per Significant Events / Subjective on 25:  \"24 hour events 25 : HFNC in daytime, BIPAP overnight.  No vasopressors, given amiodarone and digoxin yesterday, no longer in fib.  Transition heparin gtt to lovenox\"       I have noted the following changes since admission: stable for downgrade out of ICU.    I have reviewed the H&P, diagnostic data and plan of care for Chu Peralta. Please refer to progress note from 25 for full assessment and plan. Hospitalist service will be taking over care of this " patient during the current hospitalization.        Signature: Electronically signed by ROLA Caro, 02/07/25, 17:26 EST.  [facility]ist [facility]

## 2025-02-08 LAB
ALBUMIN SERPL-MCNC: 2.8 G/DL (ref 3.5–5.2)
ALBUMIN/GLOB SERPL: 1 G/DL
ALP SERPL-CCNC: 50 U/L (ref 39–117)
ALT SERPL W P-5'-P-CCNC: 35 U/L (ref 1–41)
ANION GAP SERPL CALCULATED.3IONS-SCNC: 8 MMOL/L (ref 5–15)
APTT PPP: >200 SECONDS (ref 22.7–35.4)
AST SERPL-CCNC: 57 U/L (ref 1–40)
BACTERIA SPEC AEROBE CULT: NORMAL
BACTERIA SPEC AEROBE CULT: NORMAL
BASOPHILS # BLD AUTO: 0.01 10*3/MM3 (ref 0–0.2)
BASOPHILS NFR BLD AUTO: 0.1 % (ref 0–1.5)
BILIRUB SERPL-MCNC: 0.3 MG/DL (ref 0–1.2)
BUN SERPL-MCNC: 27 MG/DL (ref 8–23)
BUN/CREAT SERPL: 42.9 (ref 7–25)
CALCIUM SPEC-SCNC: 8.2 MG/DL (ref 8.6–10.5)
CHLORIDE SERPL-SCNC: 100 MMOL/L (ref 98–107)
CO2 SERPL-SCNC: 29 MMOL/L (ref 22–29)
CREAT SERPL-MCNC: 0.63 MG/DL (ref 0.76–1.27)
DEPRECATED RDW RBC AUTO: 49.3 FL (ref 37–54)
EGFRCR SERPLBLD CKD-EPI 2021: 108.2 ML/MIN/1.73
EOSINOPHIL # BLD AUTO: 0 10*3/MM3 (ref 0–0.4)
EOSINOPHIL NFR BLD AUTO: 0 % (ref 0.3–6.2)
ERYTHROCYTE [DISTWIDTH] IN BLOOD BY AUTOMATED COUNT: 14.7 % (ref 12.3–15.4)
GLOBULIN UR ELPH-MCNC: 2.8 GM/DL
GLUCOSE BLDC GLUCOMTR-MCNC: 134 MG/DL (ref 70–105)
GLUCOSE SERPL-MCNC: 86 MG/DL (ref 65–99)
HCT VFR BLD AUTO: 35.7 % (ref 37.5–51)
HGB BLD-MCNC: 11.3 G/DL (ref 13–17.7)
IMM GRANULOCYTES # BLD AUTO: 0.05 10*3/MM3 (ref 0–0.05)
IMM GRANULOCYTES NFR BLD AUTO: 0.6 % (ref 0–0.5)
LYMPHOCYTES # BLD AUTO: 0.75 10*3/MM3 (ref 0.7–3.1)
LYMPHOCYTES NFR BLD AUTO: 8.7 % (ref 19.6–45.3)
MAGNESIUM SERPL-MCNC: 2.2 MG/DL (ref 1.6–2.4)
MCH RBC QN AUTO: 28.9 PG (ref 26.6–33)
MCHC RBC AUTO-ENTMCNC: 31.7 G/DL (ref 31.5–35.7)
MCV RBC AUTO: 91.3 FL (ref 79–97)
MONOCYTES # BLD AUTO: 0.86 10*3/MM3 (ref 0.1–0.9)
MONOCYTES NFR BLD AUTO: 10 % (ref 5–12)
NEUTROPHILS NFR BLD AUTO: 6.97 10*3/MM3 (ref 1.7–7)
NEUTROPHILS NFR BLD AUTO: 80.6 % (ref 42.7–76)
NRBC BLD AUTO-RTO: 0 /100 WBC (ref 0–0.2)
PHOSPHATE SERPL-MCNC: 3.8 MG/DL (ref 2.5–4.5)
PLATELET # BLD AUTO: 238 10*3/MM3 (ref 140–450)
PMV BLD AUTO: 8.9 FL (ref 6–12)
POTASSIUM SERPL-SCNC: 4.4 MMOL/L (ref 3.5–5.2)
PROT SERPL-MCNC: 5.6 G/DL (ref 6–8.5)
QT INTERVAL: 312 MS
QT INTERVAL: 317 MS
QT INTERVAL: 358 MS
QTC INTERVAL: 425 MS
QTC INTERVAL: 440 MS
QTC INTERVAL: 512 MS
RBC # BLD AUTO: 3.91 10*6/MM3 (ref 4.14–5.8)
SODIUM SERPL-SCNC: 137 MMOL/L (ref 136–145)
WBC NRBC COR # BLD AUTO: 8.64 10*3/MM3 (ref 3.4–10.8)

## 2025-02-08 PROCEDURE — 82948 REAGENT STRIP/BLOOD GLUCOSE: CPT | Performed by: INTERNAL MEDICINE

## 2025-02-08 PROCEDURE — 25010000002 ENOXAPARIN PER 10 MG: Performed by: EMERGENCY MEDICINE

## 2025-02-08 PROCEDURE — 94799 UNLISTED PULMONARY SVC/PX: CPT

## 2025-02-08 PROCEDURE — 94660 CPAP INITIATION&MGMT: CPT

## 2025-02-08 PROCEDURE — 85025 COMPLETE CBC W/AUTO DIFF WBC: CPT | Performed by: INTERNAL MEDICINE

## 2025-02-08 PROCEDURE — 83735 ASSAY OF MAGNESIUM: CPT | Performed by: INTERNAL MEDICINE

## 2025-02-08 PROCEDURE — 94761 N-INVAS EAR/PLS OXIMETRY MLT: CPT

## 2025-02-08 PROCEDURE — 84100 ASSAY OF PHOSPHORUS: CPT | Performed by: NURSE PRACTITIONER

## 2025-02-08 PROCEDURE — 25010000002 METHYLPREDNISOLONE PER 40 MG: Performed by: EMERGENCY MEDICINE

## 2025-02-08 PROCEDURE — 85730 THROMBOPLASTIN TIME PARTIAL: CPT | Performed by: INTERNAL MEDICINE

## 2025-02-08 PROCEDURE — 94664 DEMO&/EVAL PT USE INHALER: CPT

## 2025-02-08 PROCEDURE — 99232 SBSQ HOSP IP/OBS MODERATE 35: CPT | Performed by: INTERNAL MEDICINE

## 2025-02-08 PROCEDURE — 80053 COMPREHEN METABOLIC PANEL: CPT | Performed by: NURSE PRACTITIONER

## 2025-02-08 RX ADMIN — AMIODARONE HYDROCHLORIDE 200 MG: 200 TABLET ORAL at 08:33

## 2025-02-08 RX ADMIN — MUPIROCIN 1 APPLICATION: 20 OINTMENT TOPICAL at 08:34

## 2025-02-08 RX ADMIN — SACUBITRIL AND VALSARTAN 1 TABLET: 24; 26 TABLET, FILM COATED ORAL at 21:35

## 2025-02-08 RX ADMIN — Medication 5 MG: at 21:35

## 2025-02-08 RX ADMIN — BUDESONIDE 0.5 MG: 0.5 INHALANT RESPIRATORY (INHALATION) at 07:28

## 2025-02-08 RX ADMIN — SENNOSIDES AND DOCUSATE SODIUM 2 TABLET: 50; 8.6 TABLET ORAL at 08:33

## 2025-02-08 RX ADMIN — EMPAGLIFLOZIN 10 MG: 10 TABLET, FILM COATED ORAL at 08:33

## 2025-02-08 RX ADMIN — METOPROLOL SUCCINATE 25 MG: 25 TABLET, EXTENDED RELEASE ORAL at 08:33

## 2025-02-08 RX ADMIN — TICAGRELOR 90 MG: 90 TABLET ORAL at 21:35

## 2025-02-08 RX ADMIN — TICAGRELOR 90 MG: 90 TABLET ORAL at 08:33

## 2025-02-08 RX ADMIN — BUDESONIDE 0.5 MG: 0.5 INHALANT RESPIRATORY (INHALATION) at 19:08

## 2025-02-08 RX ADMIN — Medication 10 ML: at 08:34

## 2025-02-08 RX ADMIN — POLYETHYLENE GLYCOL 3350 17 G: 17 POWDER, FOR SOLUTION ORAL at 08:33

## 2025-02-08 RX ADMIN — AMIODARONE HYDROCHLORIDE 200 MG: 200 TABLET ORAL at 21:35

## 2025-02-08 RX ADMIN — ESCITALOPRAM 20 MG: 10 TABLET, FILM COATED ORAL at 08:33

## 2025-02-08 RX ADMIN — METHYLPREDNISOLONE SODIUM SUCCINATE 40 MG: 40 INJECTION, POWDER, FOR SOLUTION INTRAMUSCULAR; INTRAVENOUS at 08:33

## 2025-02-08 RX ADMIN — ENOXAPARIN SODIUM 80 MG: 100 INJECTION SUBCUTANEOUS at 08:33

## 2025-02-08 RX ADMIN — Medication 10 ML: at 21:35

## 2025-02-08 RX ADMIN — ATORVASTATIN CALCIUM 40 MG: 40 TABLET, FILM COATED ORAL at 21:35

## 2025-02-08 RX ADMIN — ASPIRIN 81 MG: 81 TABLET, COATED ORAL at 08:33

## 2025-02-08 RX ADMIN — SACUBITRIL AND VALSARTAN 1 TABLET: 24; 26 TABLET, FILM COATED ORAL at 08:33

## 2025-02-08 RX ADMIN — ENOXAPARIN SODIUM 80 MG: 100 INJECTION SUBCUTANEOUS at 21:35

## 2025-02-08 RX ADMIN — PANTOPRAZOLE SODIUM 40 MG: 40 TABLET, DELAYED RELEASE ORAL at 05:19

## 2025-02-08 NOTE — PROGRESS NOTES
[facility] MEDICINE SERVICE  DAILY PROGRESS NOTE    NAME: Chu Peralta  : 1965  MRN: [id number]      LOS: 5 days     PROVIDER OF SERVICE: Eusebio Kidd MD    Chief Complaint: Acute on chronic respiratory failure    Subjective:     Interval History:  History taken from: patient chart    Chu Peralta is a 59 y.o. male with PMH of COPD, Chronic hypoxic respiratory failure on home 2 L, tobacco use disorder, RLS who presented to the hospital for respiratory distress and was admitted on 2/3/2025  4:39 PM with a principal diagnosis of Acute on chronic respiratory failure.       Patient was initially brought into an outside hospital and was in respiratory distress.  Placed on BiPAP.  Initially was DNI however rescinded the DNR and was subsequently intubated.  Patient was being treated with antibiotics for multifocal pneumonia as well as influenza A.  Patient was given broad-spectrum antibiotics and transferred to [facility] for further care.     Upon arrival to [facility] the patient was noted to have concerning EKG for acute coronary syndrome and went emergently to the Cath Lab with cardiology.  He had an RCA occlusion with 1 stent placed.  Postoperative EF was around 15%.  Patient was admitted to the ICU for further care.     Chu Peralta is now Hospital Day: 5     Significant Events / Subjective      24 hour events 25 : HFNC in daytime, BIPAP overnight.  No vasopressors, given amiodarone and digoxin yesterday, no longer in fib.  Transition heparin gtt to lovenox   patient seen and examined in bed no acute distress, dyspnea on 2 L, weakness or fatigue, family at bedside,CM spoke with spouse on the phone, declines SNF at this time, would like to take him home with daughter's help and HH.       Review of Systems   Constitutional:  Positive for fatigue. Negative for appetite change, chills and fever.   HENT:  Negative for congestion.    Eyes:  Negative for pain and  redness.   Respiratory:  Positive for shortness of breath. Negative for cough and chest tightness.    Gastrointestinal:  Negative for abdominal pain, diarrhea, nausea and vomiting.   Endocrine: Negative for cold intolerance and heat intolerance.   Genitourinary:  Negative for dysuria, flank pain and frequency.   Musculoskeletal:  Negative for back pain and myalgias.   Neurological:  Positive for weakness. Negative for dizziness and headaches.   Psychiatric/Behavioral:  Negative for sleep disturbance. The patient is not nervous/anxious.      Objective:     Vital Signs  Temp:  [97.6 °F (36.4 °C)-98.2 °F (36.8 °C)] 98 °F (36.7 °C)  Heart Rate:  [] 90  Resp:  [18-32] 20  BP: (113-153)/(68-85) 131/74  Flow (L/min) (Oxygen Therapy):  [2-3] 2   Body mass index is 24.51 kg/m².    Physical Exam  GENERAL APPEARANCE:  Sitting up in bed on HFNC with mild tachypnea  HEENT:  Normal conjunctivae, normal eyelids  NECK:  trachea midline  HEART: Tachycardia but regular rhythm  LUNGS:  CTAB, no wheezing, tachypnea  ABDOMEN:  Soft, nontender, nondistended   :no badillo  EXTREMITIES:  trace pedal edema  NEUROLOGICAL: Awake/alert/interactive  Skin:  Strong distal pulses, warm feet    Diagnostic Data    Results from last 7 days   Lab Units 02/08/25  0520   WBC 10*3/mm3 8.64   HEMOGLOBIN g/dL 11.3*   HEMATOCRIT % 35.7*   PLATELETS 10*3/mm3 238   GLUCOSE mg/dL 86   CREATININE mg/dL 0.63*   BUN mg/dL 27*   SODIUM mmol/L 137   POTASSIUM mmol/L 4.4   AST (SGOT) U/L 57*   ALT (SGPT) U/L 35   ALK PHOS U/L 50   BILIRUBIN mg/dL 0.3   ANION GAP mmol/L 8.0       XR Chest 1 View    Result Date: 2/7/2025  Impression: No significant interval change. Electronically Signed: Rk Medina MD  2/7/2025 7:57 AM EST  Workstation ID: [id number]       I reviewed the patient's new clinical results.    Assessment/Plan:     Active and Resolved Problems  Active Hospital Problems    Diagnosis  POA    **Acute on chronic respiratory failure [J96.20]  Yes    STEMI  involving right coronary artery [I21.11]  Unknown      Resolved Hospital Problems   No resolved problems to display.      Cardiovascular  Pulse  Av.6  Min: 79  Max: 142  Resp  Av.4  Min: 22  Max: 39  SpO2  Av.7 %  Min: 84 %  Max: 100 %  Systolic (24hrs), Av , Min:86 , Max:139   Diastolic (24hrs), Av, Min:53, Max:81  #STEMI (RCA)  #HFrEF (15-20%)  #AF with RVR  -s/p cath with RCA stent placement on 2/3   -ASA/Brillinta  -Statin  -Was briefly on dobutamine 2/4  -Jardiance 2/5  -Amio loaded, now on PO  -Digoxin once yesterday, now in sinus  -Restart metop  -Lactic cleared  -Therapeutic lovenox  -Continue to monitor  JOSE?DS?-VASc Score for Atrial Fibrillation Stroke Risk - MDCalc-Calculated on 2025 8:56 AM  4 points -> Stroke risk was 4.8% per year in >90,000 patients (the [place] Atrial Fibrillation Cohort Study) and 6.7% risk of stroke/TIA/systemic embolism.     Pulmonary   #COPD  #Hypoxic, hypercapenic respiratory failure  -On methylpred  -Alternates from bipap to NC  -40 lasix this AM  -Nebs  -Maintain O2 sats >92%  -CXR showing: No significant consolidations, PTX, or effusions     Endocrine  #hyperglycemia-BG   -Elevated A1c, no history of DM  -Steroids exacerbating symptoms  -Goal  140-180  -Accuchecks and SSI     ID-wbc 18>8.3   Temp (24hrs), Av °F (36.7 °C), Min:97.5 °F (36.4 °C), Max:98.6 °F (37 °C)  #Shock  #Possible pneumonia  #influenza A  -Leukocytosis to 25 on presentation  -CT chest c/f multifocal pneumonia at OSH  -negative strep/legionella antigen   -No evidence of infection in urine  -RVP positive for influenza A.  Per wife symptoms has been going on >48 hours. No indications for tamiflu at this time  -Completed course of CTX  -Monitor for signs of infection  -This patient does not have an active medication from one of the medication groupers.     GI/Nutrition  #No acute issues  Diet: Cardiac; Healthy Heart (2-3 Na+); Fluid Consistency: Thin (IDDSI 0)  Patient  isn't on Tube Feeding   Bowel regimen: docusate/miralax prn  Stress ulcer ppx:lansoprazole  -Cleared to swallow     Renal/-creat 0.5   #No acute issues  -Monitor electrolytes and UOP  -Monitor nephrotoxic agents administered  -Mims- removed  -lasix 40 this AM     Heme/Onc-hgl 10.6   #No acute issues  -Usual transfusion parameters (HgB <7, Plt <10 unless procedures or bleeding)  -DVT PPX: VTE Prophylaxis:Therapeutic lovenox  Pharmacologic VTE prophylaxis orders are present.  MSK/Skin  #No acute issues  -PT/OT  -Media tab for wound pictures        Disposition:  PCU  Code status:   Level Of Support Discussed With: Patient  Code Status (Patient has no pulse and is not breathing): CPR (Attempt to Resuscitate)  Medical Interventions (Patient has pulse or is breathing): Full Support     VTE Prophylaxis:  Pharmacologic VTE prophylaxis orders are present.    Disposition Planning:   Barriers to Discharge:dyspnea   Anticipated Date of Discharge: 2/9  Place of Discharge: home      Time: 34 minutes     Code Status and Medical Interventions: CPR (Attempt to Resuscitate); Full Support   Ordered at: 02/03/25 9129     Level Of Support Discussed With:    Patient     Code Status (Patient has no pulse and is not breathing):    CPR (Attempt to Resuscitate)     Medical Interventions (Patient has pulse or is breathing):    Full Support       Signature: Electronically signed by Eusebio Kidd MD, 02/08/25, 09:58 EST.  [facility] Hospitalist Team

## 2025-02-08 NOTE — PROGRESS NOTES
CARDIOLOGY PROGRESS NOTE:    Chu Peralta  59 y.o.  male  1965  [id number]      Referring Provider: Hospitalist    Reason for follow-up: Coronary disease status post MI     Patient Care Team:  Deanne Perrin APRN as PCP - General (Nurse Practitioner)  Jm Gaona MD as Consulting Physician (Pulmonary Disease)    Subjective .  Patient doing well without any symptoms    Objective lying in bed comfortably     Review of Systems   Constitutional: Negative for malaise/fatigue.   Cardiovascular:  Negative for chest pain, dyspnea on exertion, leg swelling and palpitations.   Respiratory:  Negative for cough and shortness of breath.    Gastrointestinal:  Negative for abdominal pain, nausea and vomiting.   Neurological:  Negative for dizziness, focal weakness, headaches, light-headedness and numbness.   All other systems reviewed and are negative.      Allergies: Coconut and Keflex [cephalexin]    Scheduled Meds:amiodarone, 200 mg, Oral, Q12H  aspirin, 81 mg, Oral, Daily  atorvastatin, 40 mg, Oral, Nightly  budesonide, 0.5 mg, Nebulization, BID - RT  empagliflozin, 10 mg, Oral, Daily  enoxaparin, 1 mg/kg, Subcutaneous, Q12H  escitalopram, 20 mg, Oral, Daily  insulin lispro, 4-24 Units, Subcutaneous, Q6H  metoprolol succinate XL, 25 mg, Oral, Daily  mupirocin, 1 Application, Each Nare, BID  senna-docusate sodium, 2 tablet, Oral, BID   And  polyethylene glycol, 17 g, Oral, Daily  sacubitril-valsartan, 1 tablet, Oral, Q12H  sodium chloride, 10 mL, Intravenous, Q12H  ticagrelor, 90 mg, Oral, BID      Continuous Infusions:   PRN Meds:.  acetaminophen    aluminum-magnesium hydroxide-simethicone    senna-docusate sodium **AND** polyethylene glycol **AND** bisacodyl **AND** bisacodyl    dextrose    dextrose    diphenhydrAMINE    glucagon (human recombinant)    ipratropium-albuterol    melatonin    nicotine    nitroglycerin    ondansetron ODT **OR** ondansetron    sodium chloride    sodium chloride        VITAL SIGNS  Vitals:  "   02/08/25 1500 02/08/25 1600 02/08/25 1700 02/08/25 1800   BP: 124/74 123/76 119/74 141/78   BP Location: Left arm      Patient Position: Lying      Pulse: 93 87 83 93   Resp: 24      Temp: 98.2 °F (36.8 °C)      TempSrc: Oral      SpO2: 95% 94% 95% 96%   Weight:       Height:           Flowsheet Rows      Flowsheet Row First Filed Value   Admission Height 172.7 cm (68\") Documented at 02/03/2025 1651   Admission Weight 76 kg (167 lb 8.8 oz) Documented at 02/03/2025 1651             TELEMETRY: Sinus rhythm    Physical Exam:  Constitutional:       Appearance: Well-developed.   Eyes:      General: No scleral icterus.     Conjunctiva/sclera: Conjunctivae normal.      Pupils: Pupils are equal, round, and reactive to light.   HENT:      Head: Normocephalic and atraumatic.   Neck:      Vascular: No carotid bruit or JVD.   Pulmonary:      Effort: Pulmonary effort is normal.      Breath sounds: Normal breath sounds. No wheezing. No rales.   Cardiovascular:      Normal rate. Regular rhythm.   Pulses:     Intact distal pulses.   Abdominal:      General: Bowel sounds are normal.      Palpations: Abdomen is soft.   Musculoskeletal: Normal range of motion.      Cervical back: Normal range of motion and neck supple. Skin:     General: Skin is warm and dry.      Findings: No rash.   Neurological:      Mental Status: Alert.      Comments: No focal deficits          Results Review:   I reviewed the patient's new clinical results.  Lab Results (last 24 hours)       Procedure Component Value Units Date/Time    Blood Culture - Blood, Arm, Right [[id number]]  (Normal) Collected: 02/03/25 1806    Specimen: Blood from Arm, Right Updated: 02/08/25 1815     Blood Culture No growth at 5 days    Blood Culture - Blood, Arm, Left [[id number]]  (Normal) Collected: 02/03/25 1716    Specimen: Blood from Arm, Left Updated: 02/08/25 1731     Blood Culture No growth at 5 days    POC Glucose Q6H [[id number]]  (Abnormal) Collected: 02/08/25 1202    " Specimen: Blood Updated: 02/08/25 1204     Glucose 134 mg/dL      Comment: Serial Number: [id number]:  [id number]       Phosphorus [[id number]]  (Normal) Collected: 02/08/25 0520    Specimen: Blood Updated: 02/08/25 0607     Phosphorus 3.8 mg/dL     Magnesium [[id number]]  (Normal) Collected: 02/08/25 0520    Specimen: Blood Updated: 02/08/25 0607     Magnesium 2.2 mg/dL     Comprehensive Metabolic Panel [[id number]]  (Abnormal) Collected: 02/08/25 0520    Specimen: Blood Updated: 02/08/25 0607     Glucose 86 mg/dL      BUN 27 mg/dL      Creatinine 0.63 mg/dL      Sodium 137 mmol/L      Potassium 4.4 mmol/L      Comment: Slight hemolysis detected by analyzer. Result may be falsely elevated.        Chloride 100 mmol/L      CO2 29.0 mmol/L      Calcium 8.2 mg/dL      Total Protein 5.6 g/dL      Albumin 2.8 g/dL      ALT (SGPT) 35 U/L      AST (SGOT) 57 U/L      Comment: Slight hemolysis detected by analyzer. Result may be falsely elevated.        Alkaline Phosphatase 50 U/L      Total Bilirubin 0.3 mg/dL      Globulin 2.8 gm/dL      A/G Ratio 1.0 g/dL      BUN/Creatinine Ratio 42.9     Anion Gap 8.0 mmol/L      eGFR 108.2 mL/min/1.73     Narrative:      GFR Categories in Chronic Kidney Disease (CKD)      GFR Category          GFR (mL/min/1.73)    Interpretation  G1                     90 or greater         Normal or high (1)  G2                      60-89                Mild decrease (1)  G3a                   45-59                Mild to moderate decrease  G3b                   30-44                Moderate to severe decrease  G4                    15-29                Severe decrease  G5                    14 or less           Kidney failure          (1)In the absence of evidence of kidney disease, neither GFR category G1 or G2 fulfill the criteria for CKD.    eGFR calculation 2021 CKD-EPI creatinine equation, which does not include race as a factor    aPTT [[id number]]  (Abnormal) Collected: 02/08/25 0520     Specimen: Blood Updated: 02/08/25 0601     PTT >200.0 seconds     CBC & Differential [[id number]]  (Abnormal) Collected: 02/08/25 0520    Specimen: Blood Updated: 02/08/25 0532    Narrative:      The following orders were created for panel order CBC & Differential.  Procedure                               Abnormality         Status                     ---------                               -----------         ------                     CBC Auto Differential[[id number]]        Abnormal            Final result                 Please view results for these tests on the individual orders.    CBC Auto Differential [[id number]]  (Abnormal) Collected: 02/08/25 0520    Specimen: Blood Updated: 02/08/25 0532     WBC 8.64 10*3/mm3      RBC 3.91 10*6/mm3      Hemoglobin 11.3 g/dL      Hematocrit 35.7 %      MCV 91.3 fL      MCH 28.9 pg      MCHC 31.7 g/dL      RDW 14.7 %      RDW-SD 49.3 fl      MPV 8.9 fL      Platelets 238 10*3/mm3      Neutrophil % 80.6 %      Lymphocyte % 8.7 %      Monocyte % 10.0 %      Eosinophil % 0.0 %      Basophil % 0.1 %      Immature Grans % 0.6 %      Neutrophils, Absolute 6.97 10*3/mm3      Lymphocytes, Absolute 0.75 10*3/mm3      Monocytes, Absolute 0.86 10*3/mm3      Eosinophils, Absolute 0.00 10*3/mm3      Basophils, Absolute 0.01 10*3/mm3      Immature Grans, Absolute 0.05 10*3/mm3      nRBC 0.0 /100 WBC     POC Glucose Once [[id number]]  (Abnormal) Collected: 02/07/25 2355    Specimen: Blood Updated: 02/07/25 2358     Glucose 119 mg/dL      Comment: Serial Number: [id number]:  [id number]               Imaging Results (Last 24 Hours)       ** No results found for the last 24 hours. **            EKG      I personally viewed and interpreted the patient's EKG/Telemetry data:    ECHOCARDIOGRAM:  Results for orders placed during the hospital encounter of 02/03/25    Adult Transthoracic Echo Complete w/ Color, Spectral and Contrast if Necessary Per Protocol    Interpretation Summary    Left  ventricular systolic function is severely decreased. Left ventricular ejection fraction appears to be 26 - 30%.    Left ventricular diastolic function is consistent with (grade I) impaired relaxation.    Moderately reduced right ventricular systolic function noted.    The right ventricular cavity is mildly dilated.    Estimated right ventricular systolic pressure from tricuspid regurgitation is normal (<35 mmHg). Calculated right ventricular systolic pressure from tricuspid regurgitation is 30 mmHg.       STRESS MYOVIEW:       CARDIAC CATHETERIZATION:  Results for orders placed during the hospital encounter of 25    Cardiac Catheterization/Vascular Study    Conclusion  Table formatting from the original result was not included.  Cardiac Catheterization with PCI Report  PATIENT IDENTIFICATION  Name: Chu Peralta  Age: 59 y.o. Sex: male : 1965  MRN: [id number]    Date: 2/3/2025  PCP: @PCP@    Requesting Provider  [unfilled]    He underwent cardiac catheterization left heart catheterization with selective coronary angiography, left ventriculography, abdominal aortic angiography with iliofemoral runoff, PCI SHELLY RCA, postintervention angiography x 2    Indications for the procedure include: Inferior STEMI.    Procedure Details:  The risks, benefits, complications, treatment options, and expected outcomes were discussed with the patient. The patient and/or family concurred with the proposed plan, giving informed consent.    After informed consent the patient was brought to the cath lab after appropriate IV hydration was begun and oral premedication was given. He was further sedated with general anesthesia. He was prepped and draped in the usual manner. Using the modified Seldinger access technique and a micropuncture kit, a 6 Swedish sheath was placed in the femoral artery and a 6 Swedish sheath was placed in the femoral vein. A left heart catheterization with coronary arteriography was performed. Findings are  discussed below.    The decision was made to proceed with intervention. He received Heparin as per protocol. The details of the intervention are as follows:    A 6 Kiswahili JR4 guiding catheter was placed into the ostium of the right coronary artery.  0.014 x 180 [employer] PTCA wire was advanced into the distal RCA.  The lesion was predilated with a 2.5 x 20 trek NC.  The lesion was then treated with a 3.5 x 23 Xience.  Postintervention angiography demonstrated an excellent result with SILVIA-3 flow and no evidence of dissection or thrombus following PCI.    Significant consideration was given to placing an Impella CP device.  Abdominal aortic angiography with iliofemoral runoff demonstrated severe iliofemoral disease on the right and severe iliofemoral disease on the left.  The left was of questionable caliber to accommodate the Impella device.  Case was discussed with the patient's wife.  Risks benefits and options of placing the Impella were discussed at length with the wife and with the family over the phone.  Significant risk of limb ischemia and potential limb loss due to occlusion and/or embolization from the device.  They decided to not place the device and to utilize vasopressors to the best of their ability.    After the procedure was completed, sedation was managed by intensivist and the sheaths and catheters were all removed according to established hospital protocols.    Conscious sedation:  Conscious sedation was performed according to protocol.  I supervised and directed an independent trained observer with the assistance of monitoring the patient's level of consciousness and physiologic status throughout the procedure.  Intraoperative service time was 0 minutes.    Findings:    Hemodynamics LVEDP: 22  LV: 59/16  Ao: 52/38  Left Main Short, no significant disease  RCA 99% mid to distal RCA  LAD Luminal regularities  Circ 60 to 70% mid  SVG(s) Not applicable  GLORIA Not applicable  LV Severe global LV  impairment LVEF 15 to 20%  Coronary dominance RCA    Interventions/Vessels PCI SHELLY RCA  Guides/Wires 6 Taiwanese JR4 guide  0.014 x 180 [employer]  Devices 2.5 x 20 trek NC  3.5 x 23 Xience  Post % Stenosis 0%  Pre-procedure SILVIA flow 2  Post procedure SILVIA flow 3  Closure Device Not applicable  Complications None immediate    Estimated Blood Loss:  less than 100 mL    Specimens: None    Complications:  None; patient tolerated the procedure well.    Disposition: ICU - intubated and critically ill.    Condition: stable    Impressions:  Successful PCI SHELLY RCA with a 3.5 x 23 Xience  Residual 60 to 70% mid circumflex disease  Severe global LV systolic dysfunction LVEF estimated at 15 to 20%  Severe peripheral arterial disease    Recommendations:  Dual antiplatelet therapy consisting of aspirin and ticagrelor  Pressure support via vasopressors  Ventilator as per intensivist medicine  Further recommendations as patient's course progresses.       OTHER:         Assessment & Plan     STEMI/CAD  Patient presented with chest pain and had acute inferior wall MI and is status post PCI to the RCA  Patient had significant LV dysfunction also  Patient is currently on aspirin beta-blockers statins Entresto and Brilinta  Patient is ambulating slowly.    HFrEF  Patient has LV systolic dysfunction with a EF of 20 to 25% and hence he is on adequate therapy with beta-blockers and Entresto and will be placed on Jardiance also at discharge.    Atrial fibrillation  Patient presented with atrial fibrillation and is currently in sinus rhythm on beta-blockers and does not require anticoagulation unless he is not A-fib again.    influenza A infection  Patient is currently on antibiotics with Tamiflu and is being followed by the primary care doctor.    COPD/hypoxic respiratory failure  Patient has significant hypoxia and is requiring oxygen.    Hypertension  Patient blood pressure currently stable and is off all vasopressors and is on  beta-blockers and Entresto.    Hyperlipidemia  Patient is on statins.    I discussed the patients findings and my recommendations with patient and nurse    Stef Haile MD  02/08/25  18:42 EST               Admission

## 2025-02-09 LAB
ALBUMIN SERPL-MCNC: 2.8 G/DL (ref 3.5–5.2)
ALBUMIN/GLOB SERPL: 1.1 G/DL
ALP SERPL-CCNC: 50 U/L (ref 39–117)
ALT SERPL W P-5'-P-CCNC: 29 U/L (ref 1–41)
ANION GAP SERPL CALCULATED.3IONS-SCNC: 9.3 MMOL/L (ref 5–15)
APTT PPP: 41.4 SECONDS (ref 22.7–35.4)
AST SERPL-CCNC: 41 U/L (ref 1–40)
BILIRUB SERPL-MCNC: 0.4 MG/DL (ref 0–1.2)
BUN SERPL-MCNC: 18 MG/DL (ref 8–23)
BUN/CREAT SERPL: 36.7 (ref 7–25)
CALCIUM SPEC-SCNC: 8.3 MG/DL (ref 8.6–10.5)
CHLORIDE SERPL-SCNC: 103 MMOL/L (ref 98–107)
CO2 SERPL-SCNC: 23.7 MMOL/L (ref 22–29)
CREAT SERPL-MCNC: 0.49 MG/DL (ref 0.76–1.27)
DEPRECATED RDW RBC AUTO: 47 FL (ref 37–54)
EGFRCR SERPLBLD CKD-EPI 2021: 116.8 ML/MIN/1.73
ERYTHROCYTE [DISTWIDTH] IN BLOOD BY AUTOMATED COUNT: 14.3 % (ref 12.3–15.4)
GLOBULIN UR ELPH-MCNC: 2.5 GM/DL
GLUCOSE BLDC GLUCOMTR-MCNC: 116 MG/DL (ref 70–105)
GLUCOSE SERPL-MCNC: 93 MG/DL (ref 65–99)
HCT VFR BLD AUTO: 41.4 % (ref 37.5–51)
HGB BLD-MCNC: 13.2 G/DL (ref 13–17.7)
LARGE PLATELETS: ABNORMAL
LYMPHOCYTES # BLD MANUAL: 0.58 10*3/MM3 (ref 0.7–3.1)
LYMPHOCYTES NFR BLD MANUAL: 7 % (ref 5–12)
MAGNESIUM SERPL-MCNC: 2 MG/DL (ref 1.6–2.4)
MCH RBC QN AUTO: 28.6 PG (ref 26.6–33)
MCHC RBC AUTO-ENTMCNC: 31.9 G/DL (ref 31.5–35.7)
MCV RBC AUTO: 89.8 FL (ref 79–97)
MONOCYTES # BLD: 0.58 10*3/MM3 (ref 0.1–0.9)
MYELOCYTES NFR BLD MANUAL: 3 % (ref 0–0)
NEUTROPHILS # BLD AUTO: 6.67 10*3/MM3 (ref 1.7–7)
NEUTROPHILS NFR BLD MANUAL: 77 % (ref 42.7–76)
NEUTS BAND NFR BLD MANUAL: 4 % (ref 0–5)
NEUTS VAC BLD QL SMEAR: ABNORMAL
PHOSPHATE SERPL-MCNC: 3.3 MG/DL (ref 2.5–4.5)
PLASMA CELL PREC NFR BLD MANUAL: 2 % (ref 0–0)
PLATELET # BLD AUTO: 262 10*3/MM3 (ref 140–450)
PMV BLD AUTO: 9.3 FL (ref 6–12)
POTASSIUM SERPL-SCNC: 4.1 MMOL/L (ref 3.5–5.2)
PROT SERPL-MCNC: 5.3 G/DL (ref 6–8.5)
RBC # BLD AUTO: 4.61 10*6/MM3 (ref 4.14–5.8)
RBC MORPH BLD: NORMAL
SCAN SLIDE: NORMAL
SODIUM SERPL-SCNC: 136 MMOL/L (ref 136–145)
VARIANT LYMPHS NFR BLD MANUAL: 7 % (ref 19.6–45.3)
WBC NRBC COR # BLD AUTO: 8.24 10*3/MM3 (ref 3.4–10.8)

## 2025-02-09 PROCEDURE — 99232 SBSQ HOSP IP/OBS MODERATE 35: CPT | Performed by: INTERNAL MEDICINE

## 2025-02-09 PROCEDURE — 85025 COMPLETE CBC W/AUTO DIFF WBC: CPT | Performed by: INTERNAL MEDICINE

## 2025-02-09 PROCEDURE — 94660 CPAP INITIATION&MGMT: CPT

## 2025-02-09 PROCEDURE — 94799 UNLISTED PULMONARY SVC/PX: CPT

## 2025-02-09 PROCEDURE — 84100 ASSAY OF PHOSPHORUS: CPT | Performed by: NURSE PRACTITIONER

## 2025-02-09 PROCEDURE — 25010000002 ENOXAPARIN PER 10 MG: Performed by: EMERGENCY MEDICINE

## 2025-02-09 PROCEDURE — 82948 REAGENT STRIP/BLOOD GLUCOSE: CPT | Performed by: INTERNAL MEDICINE

## 2025-02-09 PROCEDURE — 85007 BL SMEAR W/DIFF WBC COUNT: CPT | Performed by: INTERNAL MEDICINE

## 2025-02-09 PROCEDURE — 83735 ASSAY OF MAGNESIUM: CPT | Performed by: INTERNAL MEDICINE

## 2025-02-09 PROCEDURE — 80053 COMPREHEN METABOLIC PANEL: CPT | Performed by: NURSE PRACTITIONER

## 2025-02-09 PROCEDURE — 85730 THROMBOPLASTIN TIME PARTIAL: CPT | Performed by: INTERNAL MEDICINE

## 2025-02-09 RX ADMIN — Medication 10 ML: at 08:46

## 2025-02-09 RX ADMIN — METOPROLOL SUCCINATE 25 MG: 25 TABLET, EXTENDED RELEASE ORAL at 08:46

## 2025-02-09 RX ADMIN — TICAGRELOR 90 MG: 90 TABLET ORAL at 21:02

## 2025-02-09 RX ADMIN — ASPIRIN 81 MG: 81 TABLET, COATED ORAL at 08:46

## 2025-02-09 RX ADMIN — ENOXAPARIN SODIUM 80 MG: 100 INJECTION SUBCUTANEOUS at 08:46

## 2025-02-09 RX ADMIN — BUDESONIDE 0.5 MG: 0.5 INHALANT RESPIRATORY (INHALATION) at 18:52

## 2025-02-09 RX ADMIN — AMIODARONE HYDROCHLORIDE 200 MG: 200 TABLET ORAL at 08:46

## 2025-02-09 RX ADMIN — ESCITALOPRAM 20 MG: 10 TABLET, FILM COATED ORAL at 08:45

## 2025-02-09 RX ADMIN — Medication 5 MG: at 22:14

## 2025-02-09 RX ADMIN — SACUBITRIL AND VALSARTAN 1 TABLET: 24; 26 TABLET, FILM COATED ORAL at 08:46

## 2025-02-09 RX ADMIN — TICAGRELOR 90 MG: 90 TABLET ORAL at 08:46

## 2025-02-09 RX ADMIN — Medication 10 ML: at 20:00

## 2025-02-09 RX ADMIN — SACUBITRIL AND VALSARTAN 1 TABLET: 24; 26 TABLET, FILM COATED ORAL at 21:01

## 2025-02-09 RX ADMIN — AMIODARONE HYDROCHLORIDE 200 MG: 200 TABLET ORAL at 21:01

## 2025-02-09 RX ADMIN — SENNOSIDES AND DOCUSATE SODIUM 2 TABLET: 50; 8.6 TABLET ORAL at 08:45

## 2025-02-09 RX ADMIN — EMPAGLIFLOZIN 10 MG: 10 TABLET, FILM COATED ORAL at 08:46

## 2025-02-09 RX ADMIN — ATORVASTATIN CALCIUM 40 MG: 40 TABLET, FILM COATED ORAL at 21:02

## 2025-02-09 RX ADMIN — ENOXAPARIN SODIUM 80 MG: 100 INJECTION SUBCUTANEOUS at 21:01

## 2025-02-09 NOTE — PROGRESS NOTES
CARDIOLOGY PROGRESS NOTE:    Chu Peralta  59 y.o.  male  1965  [id number]      Referring Provider: Hospitalist    Reason for follow-up: Coronary artery disease status post MI     Patient Care Team:  Deanne Perrin APRN as PCP - General (Nurse Practitioner)  Jm Gaona MD as Consulting Physician (Pulmonary Disease)    Subjective .  Patient doing well without any chest pain or shortness of breath    Objective lying in bed comfortably     Review of Systems   Constitutional: Negative for malaise/fatigue.   Cardiovascular:  Negative for chest pain, dyspnea on exertion, leg swelling and palpitations.   Respiratory:  Negative for cough and shortness of breath.    Gastrointestinal:  Negative for abdominal pain, nausea and vomiting.   Neurological:  Negative for dizziness, focal weakness, headaches, light-headedness and numbness.   All other systems reviewed and are negative.      Allergies: Coconut and Keflex [cephalexin]    Scheduled Meds:amiodarone, 200 mg, Oral, Q12H  aspirin, 81 mg, Oral, Daily  atorvastatin, 40 mg, Oral, Nightly  budesonide, 0.5 mg, Nebulization, BID - RT  empagliflozin, 10 mg, Oral, Daily  enoxaparin, 1 mg/kg, Subcutaneous, Q12H  escitalopram, 20 mg, Oral, Daily  insulin lispro, 4-24 Units, Subcutaneous, Q6H  metoprolol succinate XL, 25 mg, Oral, Daily  senna-docusate sodium, 2 tablet, Oral, BID   And  polyethylene glycol, 17 g, Oral, Daily  sacubitril-valsartan, 1 tablet, Oral, Q12H  sodium chloride, 10 mL, Intravenous, Q12H  ticagrelor, 90 mg, Oral, BID      Continuous Infusions:   PRN Meds:.  acetaminophen    aluminum-magnesium hydroxide-simethicone    senna-docusate sodium **AND** polyethylene glycol **AND** bisacodyl **AND** bisacodyl    dextrose    dextrose    diphenhydrAMINE    glucagon (human recombinant)    ipratropium-albuterol    melatonin    nicotine    nitroglycerin    ondansetron ODT **OR** ondansetron    sodium chloride    sodium chloride        VITAL SIGNS  Vitals:     "02/09/25 0816 02/09/25 0900 02/09/25 1000 02/09/25 1100   BP: 124/87 123/83 124/78 121/78   BP Location: Left arm      Patient Position: Sitting      Pulse:  89 89 84   Resp: 25      Temp: 97.5 °F (36.4 °C)      TempSrc: Oral      SpO2:  92% 93% 95%   Weight:       Height:           Flowsheet Rows      Flowsheet Row First Filed Value   Admission Height 172.7 cm (68\") Documented at 02/03/2025 1651   Admission Weight 76 kg (167 lb 8.8 oz) Documented at 02/03/2025 1651             TELEMETRY: Sinus rhythm    Physical Exam:  Constitutional:       Appearance: Well-developed.   Eyes:      General: No scleral icterus.     Conjunctiva/sclera: Conjunctivae normal.   HENT:      Head: Normocephalic and atraumatic.   Neck:      Vascular: No carotid bruit or JVD.   Pulmonary:      Effort: Pulmonary effort is normal.      Breath sounds: Normal breath sounds. No wheezing. No rales.   Cardiovascular:      Normal rate. Regular rhythm.   Pulses:     Intact distal pulses.   Abdominal:      General: Bowel sounds are normal.      Palpations: Abdomen is soft.   Musculoskeletal:      Cervical back: Normal range of motion and neck supple. Skin:     General: Skin is warm and dry.      Findings: No rash.   Neurological:      Mental Status: Alert.          Results Review:   I reviewed the patient's new clinical results.  Lab Results (last 24 hours)       Procedure Component Value Units Date/Time    Manual Differential [[id number]]  (Abnormal) Collected: 02/09/25 0425    Specimen: Blood Updated: 02/09/25 0539     Neutrophil % 77.0 %      Lymphocyte % 7.0 %      Monocyte % 7.0 %      Bands %  4.0 %      Myelocyte % 3.0 %      Plasma Cells % 2.0 %      Neutrophils Absolute 6.67 10*3/mm3      Lymphocytes Absolute 0.58 10*3/mm3      Monocytes Absolute 0.58 10*3/mm3      RBC Morphology Normal     Vacuolated Neutrophils Slight/1+     Large Platelets Slight/1+    CBC & Differential [[id number]] Collected: 02/09/25 0425    Specimen: Blood Updated: " 02/09/25 0539    Narrative:      The following orders were created for panel order CBC & Differential.  Procedure                               Abnormality         Status                     ---------                               -----------         ------                     CBC Auto Differential[[id number]]        Normal              Final result               Scan Slide[[id number]]                                       Final result                 Please view results for these tests on the individual orders.    CBC Auto Differential [[id number]]  (Normal) Collected: 02/09/25 0425    Specimen: Blood Updated: 02/09/25 0539     WBC 8.24 10*3/mm3      RBC 4.61 10*6/mm3      Hemoglobin 13.2 g/dL      Hematocrit 41.4 %      MCV 89.8 fL      MCH 28.6 pg      MCHC 31.9 g/dL      RDW 14.3 %      RDW-SD 47.0 fl      MPV 9.3 fL      Platelets 262 10*3/mm3     Narrative:      The previously reported component NRBC is no longer being reported. Previous result was 0.0 /100 WBC (Reference Range: 0.0-0.2 /100 WBC) on 2/9/2025 at 0456 EST.    Scan Slide [[id number]] Collected: 02/09/25 0425    Specimen: Blood Updated: 02/09/25 0539     Scan Slide --     Comment: See Manual Differential Results       Magnesium [[id number]]  (Normal) Collected: 02/09/25 0425    Specimen: Blood Updated: 02/09/25 0456     Magnesium 2.0 mg/dL     Comprehensive Metabolic Panel [[id number]]  (Abnormal) Collected: 02/09/25 0425    Specimen: Blood Updated: 02/09/25 0456     Glucose 93 mg/dL      BUN 18 mg/dL      Creatinine 0.49 mg/dL      Sodium 136 mmol/L      Potassium 4.1 mmol/L      Chloride 103 mmol/L      CO2 23.7 mmol/L      Calcium 8.3 mg/dL      Total Protein 5.3 g/dL      Albumin 2.8 g/dL      ALT (SGPT) 29 U/L      AST (SGOT) 41 U/L      Alkaline Phosphatase 50 U/L      Total Bilirubin 0.4 mg/dL      Globulin 2.5 gm/dL      A/G Ratio 1.1 g/dL      BUN/Creatinine Ratio 36.7     Anion Gap 9.3 mmol/L      eGFR 116.8 mL/min/1.73     Narrative:      GFR  Categories in Chronic Kidney Disease (CKD)      GFR Category          GFR (mL/min/1.73)    Interpretation  G1                     90 or greater         Normal or high (1)  G2                      60-89                Mild decrease (1)  G3a                   45-59                Mild to moderate decrease  G3b                   30-44                Moderate to severe decrease  G4                    15-29                Severe decrease  G5                    14 or less           Kidney failure          (1)In the absence of evidence of kidney disease, neither GFR category G1 or G2 fulfill the criteria for CKD.    eGFR calculation 2021 CKD-EPI creatinine equation, which does not include race as a factor    Phosphorus [[id number]]  (Normal) Collected: 02/09/25 0425    Specimen: Blood Updated: 02/09/25 0456     Phosphorus 3.3 mg/dL     aPTT [[id number]]  (Abnormal) Collected: 02/09/25 0425    Specimen: Blood Updated: 02/09/25 0447     PTT 41.4 seconds     POC Glucose Q6H [[id number]]  (Abnormal) Collected: 02/09/25 0040    Specimen: Blood Updated: 02/09/25 0042     Glucose 116 mg/dL      Comment: Serial Number: [id number]:  [id number]       Blood Culture - Blood, Arm, Right [[id number]]  (Normal) Collected: 02/03/25 1806    Specimen: Blood from Arm, Right Updated: 02/08/25 1815     Blood Culture No growth at 5 days    Blood Culture - Blood, Arm, Left [[id number]]  (Normal) Collected: 02/03/25 1716    Specimen: Blood from Arm, Left Updated: 02/08/25 1731     Blood Culture No growth at 5 days            Imaging Results (Last 24 Hours)       ** No results found for the last 24 hours. **            EKG      I personally viewed and interpreted the patient's EKG/Telemetry data:    ECHOCARDIOGRAM:  Results for orders placed during the hospital encounter of 02/03/25    Adult Transthoracic Echo Complete w/ Color, Spectral and Contrast if Necessary Per Protocol    Interpretation Summary    Left ventricular systolic function is  severely decreased. Left ventricular ejection fraction appears to be 26 - 30%.    Left ventricular diastolic function is consistent with (grade I) impaired relaxation.    Moderately reduced right ventricular systolic function noted.    The right ventricular cavity is mildly dilated.    Estimated right ventricular systolic pressure from tricuspid regurgitation is normal (<35 mmHg). Calculated right ventricular systolic pressure from tricuspid regurgitation is 30 mmHg.       STRESS MYOVIEW:       CARDIAC CATHETERIZATION:  Results for orders placed during the hospital encounter of 25    Cardiac Catheterization/Vascular Study    Conclusion  Table formatting from the original result was not included.  Cardiac Catheterization with PCI Report  PATIENT IDENTIFICATION  Name: Chu Peralta  Age: 59 y.o. Sex: male : 1965  MRN: [id number]    Date: 2/3/2025  PCP: @PCP@    Requesting Provider  [unfilled]    He underwent cardiac catheterization left heart catheterization with selective coronary angiography, left ventriculography, abdominal aortic angiography with iliofemoral runoff, PCI SHELLY RCA, postintervention angiography x 2    Indications for the procedure include: Inferior STEMI.    Procedure Details:  The risks, benefits, complications, treatment options, and expected outcomes were discussed with the patient. The patient and/or family concurred with the proposed plan, giving informed consent.    After informed consent the patient was brought to the cath lab after appropriate IV hydration was begun and oral premedication was given. He was further sedated with general anesthesia. He was prepped and draped in the usual manner. Using the modified Seldinger access technique and a micropuncture kit, a 6 [place] sheath was placed in the femoral artery and a 6 [place] sheath was placed in the femoral vein. A left heart catheterization with coronary arteriography was performed. Findings are discussed below.    The decision  was made to proceed with intervention. He received Heparin as per protocol. The details of the intervention are as follows:    A 6 [place] JR4 guiding catheter was placed into the ostium of the right coronary artery.  0.014 x 180 [employer] PTCA wire was advanced into the distal RCA.  The lesion was predilated with a 2.5 x 20 trek NC.  The lesion was then treated with a 3.5 x 23 Xience.  Postintervention angiography demonstrated an excellent result with SILVIA-3 flow and no evidence of dissection or thrombus following PCI.    Significant consideration was given to placing an Impella CP device.  Abdominal aortic angiography with iliofemoral runoff demonstrated severe iliofemoral disease on the right and severe iliofemoral disease on the left.  The left was of questionable caliber to accommodate the Impella device.  Case was discussed with the patient's wife.  Risks benefits and options of placing the Impella were discussed at length with the wife and with the family over the phone.  Significant risk of limb ischemia and potential limb loss due to occlusion and/or embolization from the device.  They decided to not place the device and to utilize vasopressors to the best of their ability.    After the procedure was completed, sedation was managed by intensivist and the sheaths and catheters were all removed according to established hospital protocols.    Conscious sedation:  Conscious sedation was performed according to protocol.  I supervised and directed an independent trained observer with the assistance of monitoring the patient's level of consciousness and physiologic status throughout the procedure.  Intraoperative service time was 0 minutes.    Findings:    Hemodynamics LVEDP: 22  LV: 59/16  Ao: 52/38  Left Main Short, no significant disease  RCA 99% mid to distal RCA  LAD Luminal regularities  Circ 60 to 70% mid  SVG(s) Not applicable  GLORIA Not applicable  LV Severe global LV impairment LVEF 15 to 20%  Coronary  dominance RCA    Interventions/Vessels PCI SHELLY RCA  Guides/Wires 6 Ukrainian JR4 guide  0.014 x 180 [employer]  Devices 2.5 x 20 trek NC  3.5 x 23 Xience  Post % Stenosis 0%  Pre-procedure SILVIA flow 2  Post procedure SILVIA flow 3  Closure Device Not applicable  Complications None immediate    Estimated Blood Loss:  less than 100 mL    Specimens: None    Complications:  None; patient tolerated the procedure well.    Disposition: ICU - intubated and critically ill.    Condition: stable    Impressions:  Successful PCI SHELLY RCA with a 3.5 x 23 Xience  Residual 60 to 70% mid circumflex disease  Severe global LV systolic dysfunction LVEF estimated at 15 to 20%  Severe peripheral arterial disease    Recommendations:  Dual antiplatelet therapy consisting of aspirin and ticagrelor  Pressure support via vasopressors  Ventilator as per intensivist medicine  Further recommendations as patient's course progresses.       OTHER:         Assessment & Plan     STEMI/CAD  Patient presented with inferior wall MI and status post PCI to the right coronary artery  Patient is currently stable on medical therapy with aspirin beta-blockers Brilinta and statins.    HFrEF  Patient has severe LV dysfunction with a EF of 20 to 25% which may not be related to the MI but is currently on beta-blockers and Entresto and will have Jardiance placed at discharge.    Atrial fibrillation  Patient had paroxysmal fibrillation is currently in sinus rhythm with beta-blockers and does not require any anticoagulation.    Influenza A infection  Patient developed hypoxic respiratory failure with pneumonia and is currently on Tamiflu and followed by the primary care doctor.    Hypertension  Patient's blood pressure initially was low and hence she was on vasopressors but now his blood pressure is good with beta-blockers and Entresto    Hyperlipidemia  Pains on statins.    COPD/hypoxic respiratory failure  Patient had influenza A with superimposed infection and  exacerbation of COPD and hence he is on oxygen    I discussed the patients findings and my recommendations with patient and nurse    Stef Haile MD  02/09/25  12:12 EST

## 2025-02-09 NOTE — PLAN OF CARE
Goal Outcome Evaluation:         Patient showered today but did become SOA while doing so

## 2025-02-09 NOTE — PROGRESS NOTES
[facility] MEDICINE SERVICE  DAILY PROGRESS NOTE    NAME: Chu Peralta  : 1965  MRN: [id number]      LOS: 6 days     PROVIDER OF SERVICE: Eusebio Kidd MD    Chief Complaint: Acute on chronic respiratory failure    Subjective:     Interval History:  History taken from: patient chart    Chu Peralta is a 59 y.o. male with PMH of COPD, Chronic hypoxic respiratory failure on home 2 L, tobacco use disorder, RLS who presented to the hospital for respiratory distress and was admitted on 2/3/2025  4:39 PM with a principal diagnosis of Acute on chronic respiratory failure.       Patient was initially brought into an outside hospital and was in respiratory distress.  Placed on BiPAP.  Initially was DNI however rescinded the DNR and was subsequently intubated.  Patient was being treated with antibiotics for multifocal pneumonia as well as influenza A.  Patient was given broad-spectrum antibiotics and transferred to [facility] for further care.     Upon arrival to [facility] the patient was noted to have concerning EKG for acute coronary syndrome and went emergently to the Cath Lab with cardiology.  He had an RCA occlusion with 1 stent placed.  Postoperative EF was around 15%.  Patient was admitted to the ICU for further care.     Chu Peralta is now Hospital Day: 5     Significant Events / Subjective      24 hour events 25 : HFNC in daytime, BIPAP overnight.  No vasopressors, given amiodarone and digoxin yesterday, no longer in fib.  Transition heparin gtt to lovenox   patient seen and examined in bed no acute distress, dyspnea on 2 L, weakness or fatigue, family at bedside,CM spoke with spouse on the phone, declines SNF at this time, would like to take him home with daughter's help and HH.    seen in bed NAD, vss, awaiting pt/ot wife wants to take patient home,     Plan: Declining SNF. Referral to [facility]; pending acceptance. Order per MD. Pt requesting rollator and BSC  for dc. Referral to [facility]; orders and verbiage per MD.     Review of Systems   Constitutional:  Positive for fatigue. Negative for appetite change, chills and fever.   HENT:  Negative for congestion.    Eyes:  Negative for pain and redness.   Respiratory:  Positive for shortness of breath. Negative for cough and chest tightness.    Gastrointestinal:  Negative for abdominal pain, diarrhea, nausea and vomiting.   Endocrine: Negative for cold intolerance and heat intolerance.   Genitourinary:  Negative for dysuria, flank pain and frequency.   Musculoskeletal:  Negative for back pain and myalgias.   Neurological:  Positive for weakness. Negative for dizziness and headaches.   Psychiatric/Behavioral:  Negative for sleep disturbance. The patient is not nervous/anxious.      Objective:     Vital Signs  Temp:  [97.4 °F (36.3 °C)-98.2 °F (36.8 °C)] 97.5 °F (36.4 °C)  Heart Rate:  [] 75  Resp:  [22-26] 25  BP: (111-141)/(68-87) 124/87   Body mass index is 24.51 kg/m².    Physical Exam  GENERAL APPEARANCE:  Sitting up in bed on HFNC with mild tachypnea  HEENT:  Normal conjunctivae, normal eyelids  NECK:  trachea midline  HEART: Tachycardia but regular rhythm  LUNGS:  CTAB, no wheezing, tachypnea  ABDOMEN:  Soft, nontender, nondistended   :no badlilo  EXTREMITIES:  trace pedal edema  NEUROLOGICAL: Awake/alert/interactive  Skin:  Strong distal pulses, warm feet    Diagnostic Data    Results from last 7 days   Lab Units 02/09/25  0425   WBC 10*3/mm3 8.24   HEMOGLOBIN g/dL 13.2   HEMATOCRIT % 41.4   PLATELETS 10*3/mm3 262   GLUCOSE mg/dL 93   CREATININE mg/dL 0.49*   BUN mg/dL 18   SODIUM mmol/L 136   POTASSIUM mmol/L 4.1   AST (SGOT) U/L 41*   ALT (SGPT) U/L 29   ALK PHOS U/L 50   BILIRUBIN mg/dL 0.4   ANION GAP mmol/L 9.3       No radiology results for the last day      I reviewed the patient's new clinical results.    Assessment/Plan:     Active and Resolved Problems  Active Hospital Problems    Diagnosis  POA    **Acute  on chronic respiratory failure [J96.20]  Yes    STEMI involving right coronary artery [I21.11]  Unknown      Resolved Hospital Problems   No resolved problems to display.      Cardiovascular  Pulse  Av.6  Min: 79  Max: 142  Resp  Av.4  Min: 22  Max: 39  SpO2  Av.7 %  Min: 84 %  Max: 100 %  Systolic (24hrs), Av , Min:86 , Max:139   Diastolic (24hrs), Av, Min:53, Max:81  #STEMI (RCA)  #HFrEF (15-20%)  #AF with RVR  -s/p cath with RCA stent placement on 2/3   -ASA/Brillinta  -Statin  -Was briefly on dobutamine 2/4  -Jardiance 2/5  -Amio loaded, now on PO  -Digoxin once yesterday, now in sinus  -Restart metop  -Lactic cleared  -Therapeutic lovenox  -Continue to monitor  JOSE?DS?-VASc Score for Atrial Fibrillation Stroke Risk - MDCalc-Calculated on 2025 8:56 AM  4 points -> Stroke risk was 4.8% per year in >90,000 patients (the [place] Atrial Fibrillation Cohort Study) and 6.7% risk of stroke/TIA/systemic embolism.     Pulmonary   #COPD  #Hypoxic, hypercapenic respiratory failure  -On methylpred  -Alternates from bipap to NC  -40 lasix this AM  -Nebs  -Maintain O2 sats >92%  -CXR showing: No significant consolidations, PTX, or effusions     Endocrine  #hyperglycemia-BG   -Elevated A1c, no history of DM  -Steroids exacerbating symptoms  -Goal  140-180  -Accuchecks and SSI     ID-wbc 18>8.3   Temp (24hrs), Av °F (36.7 °C), Min:97.5 °F (36.4 °C), Max:98.6 °F (37 °C)  #Shock  #Possible pneumonia  #influenza A  -Leukocytosis to 25 on presentation  -CT chest c/f multifocal pneumonia at OSH  -negative strep/legionella antigen   -No evidence of infection in urine  -RVP positive for influenza A.  Per wife symptoms has been going on >48 hours. No indications for tamiflu at this time  -Completed course of CTX  -Monitor for signs of infection  -This patient does not have an active medication from one of the medication groupers.     GI/Nutrition  #No acute issues  Diet: Cardiac; Healthy Heart  (2-3 Na+); Fluid Consistency: Thin (IDDSI 0)  Patient isn't on Tube Feeding   Bowel regimen: docusate/miralax prn  Stress ulcer ppx:lansoprazole  -Cleared to swallow     Renal/-creat 0.5   #No acute issues  -Monitor electrolytes and UOP  -Monitor nephrotoxic agents administered  -Mims- removed  -lasix 40 this AM     Heme/Onc-hgl 10.6   #No acute issues  -Usual transfusion parameters (HgB <7, Plt <10 unless procedures or bleeding)  -DVT PPX: VTE Prophylaxis:Therapeutic lovenox  Pharmacologic VTE prophylaxis orders are present.  MSK/Skin  #No acute issues  -PT/OT  -Media tab for wound pictures        Disposition:  PCU  Code status:   Level Of Support Discussed With: Patient  Code Status (Patient has no pulse and is not breathing): CPR (Attempt to Resuscitate)  Medical Interventions (Patient has pulse or is breathing): Full Support     VTE Prophylaxis:  Pharmacologic VTE prophylaxis orders are present.    Disposition Planning:   Barriers to Discharge:dyspnea   Anticipated Date of Discharge: 2/9  Place of Discharge: home      Time: 34 minutes     Code Status and Medical Interventions: CPR (Attempt to Resuscitate); Full Support   Ordered at: 02/03/25 3467     Level Of Support Discussed With:    Patient     Code Status (Patient has no pulse and is not breathing):    CPR (Attempt to Resuscitate)     Medical Interventions (Patient has pulse or is breathing):    Full Support       Signature: Electronically signed by Eusebio Kidd MD, 02/09/25, 09:32 EST.  [facility] Hospitalist Team

## 2025-02-09 NOTE — DISCHARGE PLACEMENT REQUEST
"Alverto Peralta (59 y.o. Male)       Date of Birth   1965    Social Security Number       Address   [address] [place] [address] [place] [facility]    Home Phone   [phone]    MRN   [id number]       Christian   None    Marital Status                               Admission Date   2/3/25    Admission Type   Urgent    Admitting Provider   Dante Truong MD    Attending Provider   Eusebio Kidd MD    Department, Room/Bed   [facility] CARDIOVASCULAR CARE UNIT, 3124/1       Discharge Date       Discharge Disposition       Discharge Destination                                 Attending Provider: Eusebio Kidd MD    Allergies: Coconut, Keflex [Cephalexin]    Isolation: Droplet   Infection: Influenza (02/03/25)   Code Status: CPR    Ht: 175.3 cm (69\")   Wt: 75.3 kg (166 lb 0.1 oz)    Admission Cmt: None   Principal Problem: Acute on chronic respiratory failure [J96.20]                   Active Insurance as of 2/3/2025       Primary Coverage       Payor Plan Insurance Group Employer/Plan Group    MEDICARE MEDICARE A & B        Payor Plan Address Payor Plan Phone Number Payor Plan Fax Number Effective Dates    [address] [phone]  10/1/2024 - None Entered    [facility] [address]         Subscriber Name Subscriber Birth Date Member ID       ALVERTO PERALTA 1965 [id number]               Secondary Coverage       Payor Plan Insurance Group Employer/Plan Group    [id number]        Payor Plan Address Payor Plan Phone Number Payor Plan Fax Number Effective Dates    [address] [address]   2/3/2025 - None Entered    [place] [address]         Subscriber Name Subscriber Birth Date Member ID       ALVERTO PERALTA 1965 [id number]                     Emergency Contacts        (Rel.) Home Phone Work Phone Mobile Phone    ISAIAS PERALTA (Spouse) -- -- [phone]    NAOMIHEATHER (Daughter) -- -- [phone]                 History & Physical    "     Karen Felton, ROLA at 25 1653       Attestation signed by Dante Truong MD at 25 [phone]    I have reviewed this documentation and agree with NP note, with any exceptions noted below.  Pt seen and examined by me personally.  I have personally reviewed all overnight events, vitals, labs, chart notes, and imaging.  The NP and I have collaborated to design the stated plan. Pt critically ill with high risk for decompensation and requires ICU level care.      CC time:     35 min    This does not include any procedure time.    Presented from OSH after presenting with hypoxic respiratory failure requiring intubation.  He is flu A positive and also being treated for possible bacterial pneumonia.  His troponins were elevated at OSH and he had an ECG here which showed an inferior stemi. Went to cath lab and was found to have RCA near occlusion and has stents placed. EF~15%.  Unable to place impella 2/2 illiac stenosis.  Patient on levo and foreign.  Will support BP with vasopressors as needed. ABG showing respiratory acidosis. RR increased. He is on steroids/getting nebs. Will leave intubated overnight and leave sedated which he is still in shock.                    Critical Care History and Physical     Chu Peralta : 1965 MRN:[id number] LOS:0 ROOM: Pool/Cath     Reason for admission: Acute on chronic respiratory failure     Assessment / Plan     Acute on chronic respiratory failure with hypoxia and hypercapnia:   Multifactorial with influenza A infection, acute exacerbation COPD, and possible superimposed bacterial pneumonia  Procalcitonin 37.4  Urine antigens for Legionella and strep pneumo negative  MRSA DNA probe negative  Sputum culture pending  He received a dose of ceftriaxone and vancomycin at H.  Continue ceftriaxone monotherapy  Ventilator settings noted and adjusted as needed.  Intubated 2/3  Close monitoring of ABG.   Minimize sedation/analgesia to keep RASS of 0 to -1.    STEMI  HS Troponin  161.5 with repeat 4495.7 at [facility]  HS troponin 1288, CK 1076, proBNP 771  Heparin drip infusing per ACS protocol  EKG with ST elevation  Cardiology consulted  Stat echocardiogram  Plan to proceed to cardiac catheterization lab    Essential Hypertension  The patient's wife reports that the patient is on an ACE inhibitor and metoprolol however he missed his doses this morning  Titrate medications as needed.  Home meds have not been verified    Hyperglycemia, no previous diagnosis of diabetes mellitus  Hemoglobin A1c 6.06  Sliding scale insulin for tight glycemic control  Recommend outpatient follow-up, defer to discharging provider      Ongoing tobacco abuse    Code Status (Patient has no pulse and is not breathing): CPR (Attempt to Resuscitate)  Medical Interventions (Patient has pulse or is breathing): Full Support       Nutrition:   NPO Diet NPO Type: Strict NPO     VTE Prophylaxis:  Pharmacologic VTE prophylaxis orders are present.         History of Present illness     Mr. Peralta is a 59-year-old gentleman with a past medical history of hypertension, chronic hypoxemic respiratory failure on home O2 at 2 L, ongoing tobacco abuse, COPD, restless leg syndrome, anxiety, and chronic back pain.  He lives at home independently with his wife.  He uses home O2 and breathing treatments.  He is a current everyday smoker of ~8 cigarettes/day and has smoked for approximately 50 years.The patient presented to the ED of [facility] today for evaluation of respiratory distress. By report, the patient had developed progressive worsening of dyspnea overnight and he did a duoneb treatment without relief.  The patient initially refused intubation and was apparently wanting to be a DNR/DNI once the patient was incapacitated by respiratory failure, the DNI was rescinded by the family once they observed the degree of respiratory distress the patient was having.  After a failed CPAP trial in the ED the  patient was intubated for mechanical ventilation support.     Information gleaned from review of transfer documentation includes:    CT chest per PE protocol negative for PE. It did reveal resolved AMBREEN confluent pneumonia from prior study however it did show scattered airspace opacities and inflammatory change throughout the lungs bilaterally concerning for new multifocal pneumonia, bronchial wal thickening in the bilateral lower lungs consistent with mucus impaction, ascending thoracic aortic aneurysm measuring 4.1 cm.  No previous diagnostic imaging available for comparison.    HS Troponin 161.5 with repeat 4495.7  .0  Lactate 7.2  WBC 21  ABG post intubation: 7.15/53/286/18/99%  DDimer 1.18  RVP positive for flu A  , RR 44-50  Saturation as low as 77%    The patient received empiric antibiotics with ceftriaxone and vancomycin after cultures were obtained.  The patient was transferred to [facility] for higher level of ICU management and the availability of multiple subspecialties    ACP: no ACP documentation on file.  The patient's wife is his next of kin and default alternate decision-maker    Patient was seen and examined on 02/03/25 at 19:35 EST .      Past Medical/Surgical/Social/Family History & Allergies     Past Medical History:   Diagnosis Date    Anxiety     COPD (chronic obstructive pulmonary disease)     Depression     Emphysema lung     Hypertension     Lung nodule     Restless leg       No past surgical history on file.   Social History     Socioeconomic History    Marital status:    Tobacco Use    Smoking status: Every Day     Current packs/day: 0.66     Types: Cigarettes      No family history on file.   Allergies   Allergen Reactions    Coconut Unknown - High Severity    Keflex [Cephalexin] Unknown - Low Severity      Social Drivers of Health     Tobacco Use: High Risk (2/3/2025)    Patient History     Smoking Tobacco Use: Every Day     Smokeless Tobacco Use: Unknown      Passive Exposure: Not on file   Alcohol Use: Not on file   Financial Resource Strain: Not on file   Food Insecurity: Not on file   Transportation Needs: Not on file   Physical Activity: Not on file   Stress: Not on file   Social Connections: Not on file   Interpersonal Safety: Not on file   Depression: Not on file   Housing Stability: Not on file   Utilities: Not on file   Health Literacy: Not on file   Employment: Not on file   Disabilities: Not on file        Home Medications     Prior to Admission medications    Medication Sig Start Date End Date Taking? Authorizing Provider   escitalopram (LEXAPRO) 20 MG tablet TAKE 1 TABLET BY MOUTH ONCE DAILY 10/30/19   Radha Guevara APRN   Fluticasone Furoate-Vilanterol (Breo Ellipta) 100-25 MCG/INH inhaler Inhale 1 puff Daily. 6/22/20   Radha Guevara APRN   lisinopril-hydrochlorothiazide (PRINZIDE,ZESTORETIC) 20-12.5 MG per tablet TAKE 1 TABLET BY MOUTH ONCE DAILY 10/30/19   Radha Guevara APRN   pseudoephedrine (SUDAFED 12 HOUR) 120 MG 12 hr tablet Take 1 tablet by mouth Every 12 (Twelve) Hours. 6/27/19   Radha Guevara APRN        Objective / Physical Exam     Vital signs:  Temp: 98.8 °F (37.1 °C)  BP: (!) 87/49  Heart Rate: 110  Resp: 24  SpO2: 99 %  Weight: 75.8 kg (167 lb)    Admission Weight: Weight: 76 kg (167 lb 8.8 oz)    Physical Exam  Vitals and nursing note reviewed.   Constitutional:       General: He is not in acute distress.     Appearance: He is ill-appearing.      Comments: Intubated and sedated   HENT:      Head: Normocephalic and atraumatic.      Right Ear: External ear normal.      Left Ear: External ear normal.      Nose: Nose normal.      Mouth/Throat:      Mouth: Mucous membranes are dry.      Comments: Oral ET tube present  Eyes:      General: No scleral icterus.     Pupils: Pupils are equal, round, and reactive to light.      Comments: Conjunctival injection   Cardiovascular:      Heart sounds: Normal heart sounds, S1 normal and S2  normal. No murmur heard.     Comments: Sinus rhythm  Pulmonary:      Effort: No respiratory distress.      Breath sounds: No rhonchi.      Comments: Tight and diminished throughout  Mild wheezing throughout  Abdominal:      General: Bowel sounds are normal. There is no distension.      Palpations: Abdomen is soft.   Musculoskeletal:      Cervical back: Neck supple. No rigidity.      Right lower leg: No edema.      Left lower leg: No edema.   Skin:     Coloration: Skin is pale.      Comments: Diaphoretic   Neurological:      Comments: Intubated and sedated     Psychiatric:      Comments: Intubated and sedated          Labs     Results from last 7 days   Lab Units 02/03/25  1716 02/03/25  1710   WBC 10*3/mm3 25.64*  --    HEMATOCRIT % 40.6  --    HEMATOCRIT POC %  --  41   PLATELETS 10*3/mm3 336  --       Results from last 7 days   Lab Units 02/03/25  1716 02/03/25  1710   SODIUM mmol/L 135*  --    POTASSIUM mmol/L 5.1  --    CHLORIDE mmol/L 101  --    CO2 mmol/L 25.4  --    BUN mg/dL 11  --    CREATININE mg/dL 0.91 0.89        Imaging     Chest X ray: My independent assessment showed no infiltrates or effusions.  Chronic changes consistent with emphysema    EKG: My independent evaluation showed sinus rhythm with ST elevation    Current Medications     Scheduled Meds:  [START ON 2/4/2025] cefTRIAXone, 2,000 mg, Intravenous, Q24H  [Transfer Hold] chlorhexidine, 15 mL, Mouth/Throat, Q12H  [Transfer Hold] insulin lispro, 4-24 Units, Subcutaneous, Q6H  [Transfer Hold] ipratropium-albuterol, 3 mL, Nebulization, Q6H - RT  [Transfer Hold] methylPREDNISolone sodium succinate, 60 mg, Intravenous, Q6H  [Transfer Hold] mupirocin, 1 Application, Each Nare, BID  [Transfer Hold] pantoprazole, 40 mg, Intravenous, QAM  [Transfer Hold] polyethylene glycol, 17 g, Oral, Daily  [Transfer Hold] sodium chloride, 10 mL, Intravenous, Q12H         Continuous Infusions:  dexmedetomidine, 0.2-1.5 mcg/kg/hr, Last Rate: 1.5 mcg/kg/hr (02/03/25  3023)  heparin, 12 Units/kg/hr, Last Rate: Stopped (02/03/25 1807)  niCARdipine, 5-15 mg/hr, Last Rate: Stopped (02/03/25 1831)  Norepinephrine-Sodium Chloride, , Last Rate: 0.3 mcg/kg/min (02/03/25 1927)  sodium chloride,            Karen G ROLA Felton   Critical Care  02/03/25   19:35 EST     Electronically signed by Dante Truong MD at 02/03/25 2112          Physician Progress Notes (most recent note)        Stef Haile MD at 02/09/25 1212          CARDIOLOGY PROGRESS NOTE:    Chu Peralta  59 y.o.  male  1965  [id number]      Referring Provider: Hospitalist    Reason for follow-up: Coronary artery disease status post MI     Patient Care Team:  Deanne Perrin APRN as PCP - General (Nurse Practitioner)  Jm Gaona MD as Consulting Physician (Pulmonary Disease)    Subjective .  Patient doing well without any chest pain or shortness of breath    Objective lying in bed comfortably     Review of Systems   Constitutional: Negative for malaise/fatigue.   Cardiovascular:  Negative for chest pain, dyspnea on exertion, leg swelling and palpitations.   Respiratory:  Negative for cough and shortness of breath.    Gastrointestinal:  Negative for abdominal pain, nausea and vomiting.   Neurological:  Negative for dizziness, focal weakness, headaches, light-headedness and numbness.   All other systems reviewed and are negative.      Allergies: Coconut and Keflex [cephalexin]    Scheduled Meds:amiodarone, 200 mg, Oral, Q12H  aspirin, 81 mg, Oral, Daily  atorvastatin, 40 mg, Oral, Nightly  budesonide, 0.5 mg, Nebulization, BID - RT  empagliflozin, 10 mg, Oral, Daily  enoxaparin, 1 mg/kg, Subcutaneous, Q12H  escitalopram, 20 mg, Oral, Daily  insulin lispro, 4-24 Units, Subcutaneous, Q6H  metoprolol succinate XL, 25 mg, Oral, Daily  senna-docusate sodium, 2 tablet, Oral, BID   And  polyethylene glycol, 17 g, Oral, Daily  sacubitril-valsartan, 1 tablet, Oral, Q12H  sodium chloride, 10 mL, Intravenous, Q12H  ticagrelor, 90  "mg, Oral, BID      Continuous Infusions:   PRN Meds:.  acetaminophen    aluminum-magnesium hydroxide-simethicone    senna-docusate sodium **AND** polyethylene glycol **AND** bisacodyl **AND** bisacodyl    dextrose    dextrose    diphenhydrAMINE    glucagon (human recombinant)    ipratropium-albuterol    melatonin    nicotine    nitroglycerin    ondansetron ODT **OR** ondansetron    sodium chloride    sodium chloride        VITAL SIGNS  Vitals:    02/09/25 0816 02/09/25 0900 02/09/25 1000 02/09/25 1100   BP: 124/87 123/83 124/78 121/78   BP Location: Left arm      Patient Position: Sitting      Pulse:  89 89 84   Resp: 25      Temp: 97.5 °F (36.4 °C)      TempSrc: Oral      SpO2:  92% 93% 95%   Weight:       Height:           Flowsheet Rows      Flowsheet Row First Filed Value   Admission Height 172.7 cm (68\") Documented at 02/03/2025 1651   Admission Weight 76 kg (167 lb 8.8 oz) Documented at 02/03/2025 1651             TELEMETRY: Sinus rhythm    Physical Exam:  Constitutional:       Appearance: Well-developed.   Eyes:      General: No scleral icterus.     Conjunctiva/sclera: Conjunctivae normal.   HENT:      Head: Normocephalic and atraumatic.   Neck:      Vascular: No carotid bruit or JVD.   Pulmonary:      Effort: Pulmonary effort is normal.      Breath sounds: Normal breath sounds. No wheezing. No rales.   Cardiovascular:      Normal rate. Regular rhythm.   Pulses:     Intact distal pulses.   Abdominal:      General: Bowel sounds are normal.      Palpations: Abdomen is soft.   Musculoskeletal:      Cervical back: Normal range of motion and neck supple. Skin:     General: Skin is warm and dry.      Findings: No rash.   Neurological:      Mental Status: Alert.          Results Review:   I reviewed the patient's new clinical results.  Lab Results (last 24 hours)       Procedure Component Value Units Date/Time    Manual Differential [[id number]]  (Abnormal) Collected: 02/09/25 0425    Specimen: Blood Updated: " 02/09/25 0539     Neutrophil % 77.0 %      Lymphocyte % 7.0 %      Monocyte % 7.0 %      Bands %  4.0 %      Myelocyte % 3.0 %      Plasma Cells % 2.0 %      Neutrophils Absolute 6.67 10*3/mm3      Lymphocytes Absolute 0.58 10*3/mm3      Monocytes Absolute 0.58 10*3/mm3      RBC Morphology Normal     Vacuolated Neutrophils Slight/1+     Large Platelets Slight/1+    CBC & Differential [[id number]] Collected: 02/09/25 0425    Specimen: Blood Updated: 02/09/25 0539    Narrative:      The following orders were created for panel order CBC & Differential.  Procedure                               Abnormality         Status                     ---------                               -----------         ------                     CBC Auto Differential[[id number]]        Normal              Final result               Scan Slide[[id number]]                                       Final result                 Please view results for these tests on the individual orders.    CBC Auto Differential [[id number]]  (Normal) Collected: 02/09/25 0425    Specimen: Blood Updated: 02/09/25 0539     WBC 8.24 10*3/mm3      RBC 4.61 10*6/mm3      Hemoglobin 13.2 g/dL      Hematocrit 41.4 %      MCV 89.8 fL      MCH 28.6 pg      MCHC 31.9 g/dL      RDW 14.3 %      RDW-SD 47.0 fl      MPV 9.3 fL      Platelets 262 10*3/mm3     Narrative:      The previously reported component NRBC is no longer being reported. Previous result was 0.0 /100 WBC (Reference Range: 0.0-0.2 /100 WBC) on 2/9/2025 at 0456 EST.    Scan Slide [[id number]] Collected: 02/09/25 0425    Specimen: Blood Updated: 02/09/25 0539     Scan Slide --     Comment: See Manual Differential Results       Magnesium [[id number]]  (Normal) Collected: 02/09/25 0425    Specimen: Blood Updated: 02/09/25 0456     Magnesium 2.0 mg/dL     Comprehensive Metabolic Panel [[id number]]  (Abnormal) Collected: 02/09/25 0425    Specimen: Blood Updated: 02/09/25 0456     Glucose 93 mg/dL      BUN 18 mg/dL       Creatinine 0.49 mg/dL      Sodium 136 mmol/L      Potassium 4.1 mmol/L      Chloride 103 mmol/L      CO2 23.7 mmol/L      Calcium 8.3 mg/dL      Total Protein 5.3 g/dL      Albumin 2.8 g/dL      ALT (SGPT) 29 U/L      AST (SGOT) 41 U/L      Alkaline Phosphatase 50 U/L      Total Bilirubin 0.4 mg/dL      Globulin 2.5 gm/dL      A/G Ratio 1.1 g/dL      BUN/Creatinine Ratio 36.7     Anion Gap 9.3 mmol/L      eGFR 116.8 mL/min/1.73     Narrative:      GFR Categories in Chronic Kidney Disease (CKD)      GFR Category          GFR (mL/min/1.73)    Interpretation  G1                     90 or greater         Normal or high (1)  G2                      60-89                Mild decrease (1)  G3a                   45-59                Mild to moderate decrease  G3b                   30-44                Moderate to severe decrease  G4                    15-29                Severe decrease  G5                    14 or less           Kidney failure          (1)In the absence of evidence of kidney disease, neither GFR category G1 or G2 fulfill the criteria for CKD.    eGFR calculation 2021 CKD-EPI creatinine equation, which does not include race as a factor    Phosphorus [[id number]]  (Normal) Collected: 02/09/25 0425    Specimen: Blood Updated: 02/09/25 0456     Phosphorus 3.3 mg/dL     aPTT [[id number]]  (Abnormal) Collected: 02/09/25 0425    Specimen: Blood Updated: 02/09/25 0447     PTT 41.4 seconds     POC Glucose Q6H [[id number]]  (Abnormal) Collected: 02/09/25 0040    Specimen: Blood Updated: 02/09/25 0042     Glucose 116 mg/dL      Comment: Serial Number: [id number]:  [id number]       Blood Culture - Blood, Arm, Right [[id number]]  (Normal) Collected: 02/03/25 1806    Specimen: Blood from Arm, Right Updated: 02/08/25 1815     Blood Culture No growth at 5 days    Blood Culture - Blood, Arm, Left [[id number]]  (Normal) Collected: 02/03/25 1716    Specimen: Blood from Arm, Left Updated: 02/08/25 1731     Blood Culture No  growth at 5 days            Imaging Results (Last 24 Hours)       ** No results found for the last 24 hours. **            EKG      I personally viewed and interpreted the patient's EKG/Telemetry data:    ECHOCARDIOGRAM:  Results for orders placed during the hospital encounter of 25    Adult Transthoracic Echo Complete w/ Color, Spectral and Contrast if Necessary Per Protocol    Interpretation Summary    Left ventricular systolic function is severely decreased. Left ventricular ejection fraction appears to be 26 - 30%.    Left ventricular diastolic function is consistent with (grade I) impaired relaxation.    Moderately reduced right ventricular systolic function noted.    The right ventricular cavity is mildly dilated.    Estimated right ventricular systolic pressure from tricuspid regurgitation is normal (<35 mmHg). Calculated right ventricular systolic pressure from tricuspid regurgitation is 30 mmHg.       STRESS MYOVIEW:       CARDIAC CATHETERIZATION:  Results for orders placed during the hospital encounter of 25    Cardiac Catheterization/Vascular Study    Conclusion  Table formatting from the original result was not included.  Cardiac Catheterization with PCI Report  PATIENT IDENTIFICATION  Name: Chu Peralta  Age: 59 y.o. Sex: male : 1965  MRN: [id number]    Date: 2/3/2025  PCP: @PCP@    Requesting Provider  [unfilled]    He underwent cardiac catheterization left heart catheterization with selective coronary angiography, left ventriculography, abdominal aortic angiography with iliofemoral runoff, PCI SHELLY RCA, postintervention angiography x 2    Indications for the procedure include: Inferior STEMI.    Procedure Details:  The risks, benefits, complications, treatment options, and expected outcomes were discussed with the patient. The patient and/or family concurred with the proposed plan, giving informed consent.    After informed consent the patient was brought to the cath lab after  appropriate IV hydration was begun and oral premedication was given. He was further sedated with general anesthesia. He was prepped and draped in the usual manner. Using the modified Seldinger access technique and a micropuncture kit, a 6 Angolan sheath was placed in the femoral artery and a 6 Angolan sheath was placed in the femoral vein. A left heart catheterization with coronary arteriography was performed. Findings are discussed below.    The decision was made to proceed with intervention. He received Heparin as per protocol. The details of the intervention are as follows:    A 6 Angolan JR4 guiding catheter was placed into the ostium of the right coronary artery.  0.014 x 180 Nebo.ru PTCA wire was advanced into the distal RCA.  The lesion was predilated with a 2.5 x 20 trek NC.  The lesion was then treated with a 3.5 x 23 Xience.  Postintervention angiography demonstrated an excellent result with SILVIA-3 flow and no evidence of dissection or thrombus following PCI.    Significant consideration was given to placing an Impella CP device.  Abdominal aortic angiography with iliofemoral runoff demonstrated severe iliofemoral disease on the right and severe iliofemoral disease on the left.  The left was of questionable caliber to accommodate the Impella device.  Case was discussed with the patient's wife.  Risks benefits and options of placing the Impella were discussed at length with the wife and with the family over the phone.  Significant risk of limb ischemia and potential limb loss due to occlusion and/or embolization from the device.  They decided to not place the device and to utilize vasopressors to the best of their ability.    After the procedure was completed, sedation was managed by intensivist and the sheaths and catheters were all removed according to established hospital protocols.    Conscious sedation:  Conscious sedation was performed according to protocol.  I supervised and directed an  independent trained observer with the assistance of monitoring the patient's level of consciousness and physiologic status throughout the procedure.  Intraoperative service time was 0 minutes.    Findings:    Hemodynamics LVEDP: 22  LV: 59/16  Ao: 52/38  Left Main Short, no significant disease  RCA 99% mid to distal RCA  LAD Luminal regularities  Circ 60 to 70% mid  SVG(s) Not applicable  GLORIA Not applicable  LV Severe global LV impairment LVEF 15 to 20%  Coronary dominance RCA    Interventions/Vessels PCI SHELLY RCA  Guides/Wires 6 Italian JR4 guide  0.014 x 180 Dezide  Devices 2.5 x 20 trek NC  3.5 x 23 Xience  Post % Stenosis 0%  Pre-procedure SILVIA flow 2  Post procedure SILVIA flow 3  Closure Device Not applicable  Complications None immediate    Estimated Blood Loss:  less than 100 mL    Specimens: None    Complications:  None; patient tolerated the procedure well.    Disposition: ICU - intubated and critically ill.    Condition: stable    Impressions:  Successful PCI SHELLY RCA with a 3.5 x 23 Xience  Residual 60 to 70% mid circumflex disease  Severe global LV systolic dysfunction LVEF estimated at 15 to 20%  Severe peripheral arterial disease    Recommendations:  Dual antiplatelet therapy consisting of aspirin and ticagrelor  Pressure support via vasopressors  Ventilator as per intensivist medicine  Further recommendations as patient's course progresses.       OTHER:         Assessment & Plan     STEMI/CAD  Patient presented with inferior wall MI and status post PCI to the right coronary artery  Patient is currently stable on medical therapy with aspirin beta-blockers Brilinta and statins.    HFrEF  Patient has severe LV dysfunction with a EF of 20 to 25% which may not be related to the MI but is currently on beta-blockers and Entresto and will have Jardiance placed at discharge.    Atrial fibrillation  Patient had paroxysmal fibrillation is currently in sinus rhythm with beta-blockers and does not require any  anticoagulation.    Influenza A infection  Patient developed hypoxic respiratory failure with pneumonia and is currently on Tamiflu and followed by the primary care doctor.    Hypertension  Patient's blood pressure initially was low and hence she was on vasopressors but now his blood pressure is good with beta-blockers and Entresto    Hyperlipidemia  Pains on statins.    COPD/hypoxic respiratory failure  Patient had influenza A with superimposed infection and exacerbation of COPD and hence he is on oxygen    I discussed the patients findings and my recommendations with patient and nurse    Stef Haile MD  25  12:12 EST                Electronically signed by Stef Haile MD at 25 1214          Physical Therapy Notes (most recent note)        Jocelynn Nagel, PT at 25 1545  Version 1 of 1         Patient Name: Chu Peralta  : 1965    MRN: [id number]                              Today's Date: 2025       Admit Date: 2/3/2025    Visit Dx:     ICD-10-CM ICD-9-CM   1. Acute on chronic respiratory failure with hypoxia and hypercapnia  J96.21 518.84    J96.22 786.09     799.02     Patient Active Problem List   Diagnosis    Acute on chronic respiratory failure    STEMI involving right coronary artery     Past Medical History:   Diagnosis Date    Anxiety     COPD (chronic obstructive pulmonary disease)     Depression     Emphysema lung     Hypertension     Lung nodule     Restless leg      Past Surgical History:   Procedure Laterality Date    CARDIAC CATHETERIZATION N/A 2/3/2025    Procedure: Left Heart Cath;  Surgeon: Dale Peterson DO;  Location:  [facility] CATH INVASIVE LOCATION;  Service: Cardiovascular;  Laterality: N/A;    CARDIAC CATHETERIZATION N/A 2/3/2025    Procedure: Percutaneous Coronary Intervention;  Surgeon: Dale Peterson DO;  Location:  [facility] CATH INVASIVE LOCATION;  Service: Cardiovascular;  Laterality: N/A;    CARDIAC CATHETERIZATION N/A 2/3/2025     Procedure: Stent SHELLY coronary;  Surgeon: Dale Peterson DO;  Location: [facility] CATH INVASIVE LOCATION;  Service: Cardiovascular;  Laterality: N/A;  RCA      General Information       Row Name 02/07/25 1527          Physical Therapy Time and Intention    Document Type evaluation  -CM     Mode of Treatment physical therapy  -CM       Row Name 02/07/25 1527          General Information    Patient Profile Reviewed yes  -CM     Prior Level of Function independent:;all household mobility;gait;driving  short community distances; uses scooter in large stores; uses 2LO2 prn at home 2* severe copd  -CM     Existing Precautions/Restrictions fall  -CM     Barriers to Rehab medically complex;previous functional deficit  -CM       Row Name 02/07/25 1527          Living Environment    People in Home spouse;child(red), adult  wife reports someone usually available to provide 24 hr care  -CM       Row Name 02/07/25 1527          Home Main Entrance    Number of Stairs, Main Entrance four  -CM     Stair Railings, Main Entrance railings safe and in good condition  -CM       Row Name 02/07/25 1527          Stairs Within Home, Primary    Number of Stairs, Within Home, Primary none  -CM       Row Name 02/07/25 1527          Cognition    Orientation Status (Cognition) oriented x 4  -CM       Row Name 02/07/25 1527          Safety Issues/Impairments Affecting Functional Mobility    Impairments Affecting Function (Mobility) balance;endurance/activity tolerance;strength;pain;shortness of breath  -CM               User Key  (r) = Recorded By, (t) = Taken By, (c) = Cosigned By      Initials Name Provider Type    CM Jocelynn Nagel, PT Physical Therapist                   Mobility       Row Name 02/07/25 1529          Bed Mobility    Bed Mobility bed mobility (all) activities  -CM     All Activities, [place] (Bed Mobility) not tested  -CM     Comment, (Bed Mobility) in chair when PT arrived.  -CM       Row Name 02/07/25  1529          Bed-Chair Transfer    Bed-Chair [place] (Transfers) not tested  -CM       Row Name 02/07/25 1529          Sit-Stand Transfer    Sit-Stand [place] (Transfers) minimum assist (75% patient effort)  -CM       Row Name 02/07/25 1529          Gait/Stairs (Locomotion)    [place] Level (Gait) unable to assess  -CM     Patient was able to Ambulate no, other medical factors prevent ambulation  -CM     Reason Patient was unable to Ambulate Hypoxia/Respiratory Distress;Other (Comment)  severe soa w/ standing alone  -CM     Distance in Feet (Gait) 0  -CM               User Key  (r) = Recorded By, (t) = Taken By, (c) = Cosigned By      Initials Name Provider Type    Jocelynn Burr, PT Physical Therapist                   Obj/Interventions       Row Name 02/07/25 1530          Range of Motion Comprehensive    General Range of Motion bilateral lower extremity ROM WNL  -CM       [facility] Name 02/07/25 1530          Strength Comprehensive (MMT)    General Manual Muscle Testing (MMT) Assessment lower extremity strength deficits identified  -CM     Comment, General Manual Muscle Testing (MMT) Assessment B hips 3-/5; other LE mm groups 4-/5  -CM       Row Name 02/07/25 1530          Balance    Balance Assessment sitting static balance;sitting dynamic balance;standing static balance;standing dynamic balance  -CM     Static Sitting Balance independent  -CM     Dynamic Sitting Balance independent  -CM     Position, Sitting Balance sitting in chair  -CM     Static Standing Balance minimal assist  -CM     Dynamic Standing Balance minimal assist;moderate assist  -CM     Position/Device Used, Standing Balance supported  -CM       Row Name 02/07/25 1530          Sensory Assessment (Somatosensory)    Sensory Assessment (Somatosensory) sensation intact  -CM               User Key  (r) = Recorded By, (t) = Taken By, (c) = Cosigned By      Initials Name Provider Type    Jocelynn Burr, PT Physical Therapist                    Goals/Plan       Row Name 02/07/25 1541          Bed Mobility Goal 1 (PT)    Activity/Assistive Device (Bed Mobility Goal 1, PT) bed mobility activities, all  -CM     [place] Level/Cues Needed (Bed Mobility Goal 1, PT) independent  -CM     Time Frame (Bed Mobility Goal 1, PT) 2 weeks  -CM       Row Name 02/07/25 1541          Transfer Goal 1 (PT)    Activity/Assistive Device (Transfer Goal 1, PT) transfers, all;walker, rolling  -CM     [place] Level/Cues Needed (Transfer Goal 1, PT) modified independence  -CM     Time Frame (Transfer Goal 1, PT) 2 weeks  -CM     Strategies/Barriers (Transfers Goal 1, PT) no soa, sats > 92%, RR < 30  -CM       Row Name 02/07/25 1541          Gait Training Goal 1 (PT)    Activity/Assistive Device (Gait Training Goal 1, PT) gait (walking locomotion);walker, rolling  -CM     [place] Level (Gait Training Goal 1, PT) contact guard required  -CM     Distance (Gait Training Goal 1, PT) 100 ft, no soa, sats > 92%, RR < 30  -CM     Time Frame (Gait Training Goal 1, PT) 2 weeks  -CM       Row Name 02/07/25 1541          Therapy Assessment/Plan (PT)    Planned Therapy Interventions (PT) balance training;bed mobility training;gait training;home exercise program;neuromuscular re-education;motor coordination training;patient/family education;postural re-education;strengthening;ROM (range of motion);transfer training  -CM               User Key  (r) = Recorded By, (t) = Taken By, (c) = Cosigned By      Initials Name Provider Type    Jocelynn Burr, PT Physical Therapist                   Clinical Impression       Row Name 02/07/25 1531          Pain    Pretreatment Pain Rating 2/10  -CM     Posttreatment Pain Rating 2/10  -CM     Pain Location other (see comments)  generalized  -CM     Pain Management Interventions activity modification encouraged;breathing exercises utilized;nursing notified;diversional activity provided;exercise or physical activity utilized  -CM      Response to Pain Interventions activity participation with tolerable pain  -       Row Name 02/07/25 1533          Plan of Care Review    Plan of Care Reviewed With patient;spouse  -CM     Outcome Evaluation 58 yo male adm 2/3/25. Presents to LTAC, located within [facility] w/ severe soa. Was dx w/ acute respiratory failure and required intubation. Was then transferred to [facility], where he was also dx w/ stemi, a fib w/ rvr, shock 2* distributive and cardiogenic causes, multifocal pna, hyperglycemia, (+) flu. Was intub from 2/3 to 2/5. Pt has EF of 15%. PMH: severe copd, home O2 prn 2L. At baseline, pt lives w/ wife in single level home w/ 4 stairs and handrail to enter. Pt normally ambulates short community distances only. Does not use assistive device and has no DME at home. Uses scooter in large stores. Today, pt is tolerating being weaned to room air w/ O2 sats remaining 95% (RN approved). Pt has marked proximal weakness, and he has severe soa w/ just coming to standing. Not able to progress to ambulation due to soa. Pt not safe for home and will require SNF at d/c. Will follow.  -       Row Name 02/07/25 1534          Therapy Assessment/Plan (PT)    Patient/Family Therapy Goals Statement (PT) agreeable to SNF at d/c  -     Rehab Potential (PT) good  -CM     Criteria for Skilled Interventions Met (PT) yes;meets criteria;skilled treatment is necessary  -     Therapy Frequency (PT) 5 times/wk  -CM     Predicted Duration of Therapy Intervention (PT) until d/c.  -       Row Name 02/07/25 1538          Vital Signs    Pre Systolic BP Rehab 129  -CM     Pre Treatment Diastolic BP 72  -CM     Post Systolic BP Rehab 128  -CM     Post Treatment Diastolic BP 88  -CM     Pretreatment Heart Rate (beats/min) 104  -CM     Intratreatment Heart Rate (beats/min) 110  -CM     Posttreatment Heart Rate (beats/min) 103  -CM     Pretreatment Resp Rate (breaths/min) 19  -CM     Intratreatment Resp Rate (breaths/min) 38  -CM     Posttreatment Resp Rate  (breaths/min) 30  -CM     Pre SpO2 (%) 98  -CM     O2 Delivery Pre Treatment supplemental O2  3L  -CM     Intra SpO2 (%) 96  -CM     O2 Delivery Intra Treatment room air  -CM     Post SpO2 (%) 95  -CM     O2 Delivery Post Treatment room air  -CM       Row Name 02/07/25 1539          Positioning and Restraints    Pre-Treatment Position sitting in chair/recliner  -CM     Post Treatment Position chair  -CM     In Chair notified nsg;sitting;call light within reach;encouraged to call for assist;exit alarm on;with family/caregiver  -CM               User Key  (r) = Recorded By, (t) = Taken By, (c) = Cosigned By      Initials Name Provider Type    Jocelynn Burr, PT Physical Therapist                   Outcome Measures       Row Name 02/07/25 1542          How much help from another person do you currently need...    Turning from your back to your side while in flat bed without using bedrails? 3  -CM     Moving from lying on back to sitting on the side of a flat bed without bedrails? 3  -CM     Moving to and from a bed to a chair (including a wheelchair)? 2  -CM     Standing up from a chair using your arms (e.g., wheelchair, bedside chair)? 3  -CM     Climbing 3-5 steps with a railing? 1  -CM     To walk in hospital room? 1  -CM     AM-PAC 6 Clicks Score (PT) 13  -CM               User Key  (r) = Recorded By, (t) = Taken By, (c) = Cosigned By      Initials Name Provider Type    Jocelynn Burr, PT Physical Therapist                                 Physical Therapy Education       Title: PT OT SLP Therapies (Done)       Topic: Physical Therapy (Done)       Point: Mobility training (Done)       Learning Progress Summary            Patient Acceptance, E,TB, VU by CM at 2/7/2025 1543   Significant Other Acceptance, E,TB, VU by CM at 2/7/2025 1543                      Point: Home exercise program (Done)       Learning Progress Summary            Patient Acceptance, E,TB, VU by CM at 2/7/2025 1543   Significant Other  Acceptance, E,TB, VU by CM at 2/7/2025 1543                      Point: Body mechanics (Done)       Learning Progress Summary            Patient Acceptance, E,TB, VU by CM at 2/7/2025 1543   Significant Other Acceptance, E,TB, VU by CM at 2/7/2025 1543                      Point: Precautions (Done)       Learning Progress Summary            Patient Acceptance, E,TB, VU by CM at 2/7/2025 1543   Significant Other Acceptance, E,TB, VU by CM at 2/7/2025 1543                                      User Key       Initials Effective Dates Name Provider Type Discipline    ESTEBAN 06/16/21 -  Jocelynn Nagel, PT Physical Therapist PT                  PT Recommendation and Plan  Planned Therapy Interventions (PT): balance training, bed mobility training, gait training, home exercise program, neuromuscular re-education, motor coordination training, patient/family education, postural re-education, strengthening, ROM (range of motion), transfer training  Outcome Evaluation: 60 yo male adm 2/3/25. Presents to [facility] w/ severe soa. Was dx w/ acute respiratory failure and required intubation. Was then transferred to [facility], where he was also dx w/ stemi, a fib w/ rvr, shock 2* distributive and cardiogenic causes, multifocal pna, hyperglycemia, (+) flu. Was intub from 2/3 to 2/5. Pt has EF of 15%. PMH: severe copd, home O2 prn 2L. At baseline, pt lives w/ wife in single level home w/ 4 stairs and handrail to enter. Pt normally ambulates short community distances only. Does not use assistive device and has no DME at home. Uses scooter in large stores. Today, pt is tolerating being weaned to room air w/ O2 sats remaining 95% (RN approved). Pt has marked proximal weakness, and he has severe soa w/ just coming to standing. Not able to progress to ambulation due to soa. Pt not safe for home and will require SNF at d/c. Will follow.     Time Calculation:   PT Evaluation Complexity  History, PT Evaluation Complexity: 3 or more personal factors and/or  comorbidities  Examination of Body Systems (PT Eval Complexity): total of 4 or more elements  Clinical Presentation (PT Evaluation Complexity): evolving  Clinical Decision Making (PT Evaluation Complexity): moderate complexity  Overall Complexity (PT Evaluation Complexity): moderate complexity     PT Charges       Row Name 02/07/25 1543             Time Calculation    Start Time 1415  -CM      Stop Time 1431  -CM      Time Calculation (min) 16 min  -CM      PT Received On 02/07/25  -CM      PT - Next Appointment 02/09/25  -CM      PT Goal Re-Cert Due Date 02/21/25  -CM         Time Calculation- PT    Total Timed Code Minutes- PT 0 minute(s)  -CM                User Key  (r) = Recorded By, (t) = Taken By, (c) = Cosigned By      Initials Name Provider Type    CM Jocelynn Nagel, PT Physical Therapist                  Therapy Charges for Today       Code Description Service Date Service Provider Modifiers Qty    40582880593 HC PT EVAL MOD COMPLEXITY 4 2/7/2025 Jocelynn Nagel, PT GP 1            PT G-Codes  AM-PAC 6 Clicks Score (PT): 13  PT Discharge Summary  Anticipated Discharge Disposition (PT): skilled nursing facility    Jocelynn Nagel PT  2/7/2025      Electronically signed by Jocelynn Nagel, PT at 02/07/25 1545          Occupational Therapy Notes (most recent note)        Maryjane Salazar, OT at 02/07/25 1602          Goal Outcome Evaluation:  Plan of Care Reviewed With: patient, spouse        Progress: improving  Outcome Evaluation: Pt is a 59 y.o. year old male admitted to [facility] on 2/3/25 with respiratory distress. Pt found to have Flu A and due to severe respiratory distress pt was intubated    Pmhx significant for COPD, chronic hypoxic respiratory failure (2L O2 at home), tobacco use disorder, RLS, Anxiety, HTN    At baseline, pt lives with spouse in [facility] with 4STE and handrail. They have a tub shower combo with no sc or grab bars. He was previously independent with all ADLs and was assisting with  some IADLs though he became fatigued quickly and required frequent rest breaks. He was amb without AD for household distances but required motor scooter at community distances (motorized scooter at grocery store). Wife works part-time. Pt on 3L O2 prn at baseline.    At eval, pt is A&Ox4 and notes that he feels weaker than usual. Initally on 3L O2 but weaned down to room air and maintained 96% or higher for the majority of session. Left on room air at end of session. Pt UE strength noted to be significantly decreased, especially proximal shoulder stability. Able to CTS with min Ax2 but limited tolerance and required support to maintain standing. OT to follow while inpatient and recommends SNF at [place] to maximize strength and endurance for ADLs and IADLs prior to return to home with wife.    Anticipated Discharge Disposition (OT): skilled nursing facility                          Electronically signed by Maryjane Salazar OT at 02/07/25 0671

## 2025-02-09 NOTE — CASE MANAGEMENT/SOCIAL WORK
Continued Stay Note  [facility]     Patient Name: Chu Peralta  MRN: [id number]  Today's Date: 2/9/2025    Admit Date: 2/3/2025    Plan: Declining SNF. Referral to [facility]; pending acceptance. Order per MD. Pt requesting rollator and BSC for dc. Referral to [facility]; orders and verbiage per MD.   Discharge Plan       Row Name 02/09/25 1743       Plan    Plan Declining SNF. Referral to [facility]; pending acceptance. Order per MD. Pt requesting rollator and BSC for dc. Referral to [facility]; orders and verbiage per MD.    Plan Comments Met [facility] pt and spouse at bedside to discuss PT recommendations for SNF. Pt and spouse declining SNF. Spouse states she is a CNA and can assist at home. Pt agreeable to HH, but did not have HH preference. Referral placed to [facility]; pending acceptance. HH order per MD. Pt/spouse requesting a rollator and BSC for dc. Referral placed to [facility]. Orders and verbiage per MD.             Vashti Emmanuel, RN BSN  Weekend   [facility]  Phone: [phone]  Fax: [phone]

## 2025-02-10 ENCOUNTER — DOCUMENTATION (OUTPATIENT)
Dept: HOME HEALTH SERVICES | Facility: HOME HEALTHCARE | Age: 60
End: 2025-02-10
Payer: MEDICARE

## 2025-02-10 ENCOUNTER — HOME HEALTH ADMISSION (OUTPATIENT)
Dept: HOME HEALTH SERVICES | Facility: HOME HEALTHCARE | Age: 60
End: 2025-02-10
Payer: MEDICARE

## 2025-02-10 ENCOUNTER — READMISSION MANAGEMENT (OUTPATIENT)
Dept: CALL CENTER | Facility: HOSPITAL | Age: 60
End: 2025-02-10
Payer: MEDICARE

## 2025-02-10 VITALS
SYSTOLIC BLOOD PRESSURE: 118 MMHG | TEMPERATURE: 97.6 F | RESPIRATION RATE: 24 BRPM | DIASTOLIC BLOOD PRESSURE: 68 MMHG | OXYGEN SATURATION: 91 % | WEIGHT: 164.7 LBS | BODY MASS INDEX: 24.4 KG/M2 | HEIGHT: 69 IN | HEART RATE: 77 BPM

## 2025-02-10 LAB
ALBUMIN SERPL-MCNC: 2.9 G/DL (ref 3.5–5.2)
ALBUMIN/GLOB SERPL: 1 G/DL
ALP SERPL-CCNC: 52 U/L (ref 39–117)
ALT SERPL W P-5'-P-CCNC: 38 U/L (ref 1–41)
ANION GAP SERPL CALCULATED.3IONS-SCNC: 10.9 MMOL/L (ref 5–15)
APTT PPP: 38.2 SECONDS (ref 22.7–35.4)
AST SERPL-CCNC: 46 U/L (ref 1–40)
BILIRUB SERPL-MCNC: 0.3 MG/DL (ref 0–1.2)
BUN SERPL-MCNC: 20 MG/DL (ref 8–23)
BUN/CREAT SERPL: 29.9 (ref 7–25)
CALCIUM SPEC-SCNC: 8.4 MG/DL (ref 8.6–10.5)
CHLORIDE SERPL-SCNC: 103 MMOL/L (ref 98–107)
CO2 SERPL-SCNC: 25.1 MMOL/L (ref 22–29)
CREAT SERPL-MCNC: 0.67 MG/DL (ref 0.76–1.27)
DEPRECATED RDW RBC AUTO: 46.1 FL (ref 37–54)
EGFRCR SERPLBLD CKD-EPI 2021: 106.2 ML/MIN/1.73
ERYTHROCYTE [DISTWIDTH] IN BLOOD BY AUTOMATED COUNT: 14.5 % (ref 12.3–15.4)
GLOBULIN UR ELPH-MCNC: 3 GM/DL
GLUCOSE SERPL-MCNC: 90 MG/DL (ref 65–99)
HCT VFR BLD AUTO: 42.4 % (ref 37.5–51)
HGB BLD-MCNC: 13.6 G/DL (ref 13–17.7)
LYMPHOCYTES # BLD MANUAL: 1.91 10*3/MM3 (ref 0.7–3.1)
LYMPHOCYTES NFR BLD MANUAL: 7 % (ref 5–12)
MAGNESIUM SERPL-MCNC: 2 MG/DL (ref 1.6–2.4)
MCH RBC QN AUTO: 28.1 PG (ref 26.6–33)
MCHC RBC AUTO-ENTMCNC: 32.1 G/DL (ref 31.5–35.7)
MCV RBC AUTO: 87.6 FL (ref 79–97)
MONOCYTES # BLD: 0.64 10*3/MM3 (ref 0.1–0.9)
NEUTROPHILS # BLD AUTO: 6.54 10*3/MM3 (ref 1.7–7)
NEUTROPHILS NFR BLD MANUAL: 69 % (ref 42.7–76)
NEUTS BAND NFR BLD MANUAL: 3 % (ref 0–5)
NRBC SPEC MANUAL: 1 /100 WBC (ref 0–0.2)
PHOSPHATE SERPL-MCNC: 4 MG/DL (ref 2.5–4.5)
PLATELET # BLD AUTO: 317 10*3/MM3 (ref 140–450)
PMV BLD AUTO: 9.1 FL (ref 6–12)
POTASSIUM SERPL-SCNC: 4.1 MMOL/L (ref 3.5–5.2)
PROT SERPL-MCNC: 5.9 G/DL (ref 6–8.5)
RBC # BLD AUTO: 4.84 10*6/MM3 (ref 4.14–5.8)
RBC MORPH BLD: NORMAL
SCAN SLIDE: NORMAL
SMALL PLATELETS BLD QL SMEAR: ADEQUATE
SODIUM SERPL-SCNC: 139 MMOL/L (ref 136–145)
VARIANT LYMPHS NFR BLD MANUAL: 17 % (ref 19.6–45.3)
VARIANT LYMPHS NFR BLD MANUAL: 4 % (ref 0–5)
WBC MORPH BLD: NORMAL
WBC NRBC COR # BLD AUTO: 9.09 10*3/MM3 (ref 3.4–10.8)

## 2025-02-10 PROCEDURE — 94799 UNLISTED PULMONARY SVC/PX: CPT

## 2025-02-10 PROCEDURE — 84100 ASSAY OF PHOSPHORUS: CPT | Performed by: NURSE PRACTITIONER

## 2025-02-10 PROCEDURE — 83735 ASSAY OF MAGNESIUM: CPT | Performed by: INTERNAL MEDICINE

## 2025-02-10 PROCEDURE — 94664 DEMO&/EVAL PT USE INHALER: CPT

## 2025-02-10 PROCEDURE — 85007 BL SMEAR W/DIFF WBC COUNT: CPT | Performed by: INTERNAL MEDICINE

## 2025-02-10 PROCEDURE — 85025 COMPLETE CBC W/AUTO DIFF WBC: CPT | Performed by: INTERNAL MEDICINE

## 2025-02-10 PROCEDURE — 85730 THROMBOPLASTIN TIME PARTIAL: CPT | Performed by: INTERNAL MEDICINE

## 2025-02-10 PROCEDURE — 25010000002 ENOXAPARIN PER 10 MG: Performed by: EMERGENCY MEDICINE

## 2025-02-10 PROCEDURE — 94660 CPAP INITIATION&MGMT: CPT

## 2025-02-10 PROCEDURE — 94761 N-INVAS EAR/PLS OXIMETRY MLT: CPT

## 2025-02-10 PROCEDURE — 99232 SBSQ HOSP IP/OBS MODERATE 35: CPT | Performed by: STUDENT IN AN ORGANIZED HEALTH CARE EDUCATION/TRAINING PROGRAM

## 2025-02-10 PROCEDURE — 80053 COMPREHEN METABOLIC PANEL: CPT | Performed by: NURSE PRACTITIONER

## 2025-02-10 RX ORDER — ENOXAPARIN SODIUM 100 MG/ML
40 INJECTION SUBCUTANEOUS EVERY 24 HOURS
Status: DISCONTINUED | OUTPATIENT
Start: 2025-02-11 | End: 2025-02-10 | Stop reason: HOSPADM

## 2025-02-10 RX ORDER — CALCIUM CARBONATE 160(400)MG
1 TABLET,CHEWABLE ORAL DAILY
Qty: 1 EACH | Refills: 0 | Status: SHIPPED | OUTPATIENT
Start: 2025-02-10

## 2025-02-10 RX ORDER — ATORVASTATIN CALCIUM 40 MG/1
40 TABLET, FILM COATED ORAL NIGHTLY
Qty: 90 TABLET | Refills: 3 | Status: SHIPPED | OUTPATIENT
Start: 2025-02-10

## 2025-02-10 RX ORDER — ASPIRIN 81 MG/1
81 TABLET ORAL DAILY
Qty: 30 TABLET | Refills: 0 | Status: SHIPPED | OUTPATIENT
Start: 2025-02-11

## 2025-02-10 RX ORDER — AMIODARONE HYDROCHLORIDE 200 MG/1
200 TABLET ORAL EVERY 12 HOURS SCHEDULED
Qty: 60 TABLET | Refills: 0 | Status: SHIPPED | OUTPATIENT
Start: 2025-02-10

## 2025-02-10 RX ORDER — NICOTINE 21 MG/24HR
1 PATCH, TRANSDERMAL 24 HOURS TRANSDERMAL DAILY PRN
Qty: 30 EACH | Refills: 0 | Status: SHIPPED | OUTPATIENT
Start: 2025-02-10

## 2025-02-10 RX ADMIN — Medication 10 ML: at 08:37

## 2025-02-10 RX ADMIN — ESCITALOPRAM 20 MG: 10 TABLET, FILM COATED ORAL at 08:36

## 2025-02-10 RX ADMIN — SACUBITRIL AND VALSARTAN 1 TABLET: 24; 26 TABLET, FILM COATED ORAL at 08:36

## 2025-02-10 RX ADMIN — ENOXAPARIN SODIUM 80 MG: 100 INJECTION SUBCUTANEOUS at 08:36

## 2025-02-10 RX ADMIN — BUDESONIDE 0.5 MG: 0.5 INHALANT RESPIRATORY (INHALATION) at 07:06

## 2025-02-10 RX ADMIN — METOPROLOL SUCCINATE 25 MG: 25 TABLET, EXTENDED RELEASE ORAL at 08:36

## 2025-02-10 RX ADMIN — ASPIRIN 81 MG: 81 TABLET, COATED ORAL at 08:36

## 2025-02-10 RX ADMIN — AMIODARONE HYDROCHLORIDE 200 MG: 200 TABLET ORAL at 08:36

## 2025-02-10 RX ADMIN — TICAGRELOR 90 MG: 90 TABLET ORAL at 08:36

## 2025-02-10 RX ADMIN — EMPAGLIFLOZIN 10 MG: 10 TABLET, FILM COATED ORAL at 08:36

## 2025-02-10 RX ADMIN — SENNOSIDES AND DOCUSATE SODIUM 2 TABLET: 50; 8.6 TABLET ORAL at 08:36

## 2025-02-10 NOTE — PROGRESS NOTES
Spoke with patients wife.  Demographics and last 4 verified. Agreeable to Home Health services.No other agency in home.  Additional info: Please call wifes number to set up appts.at [phone].  Disciplines: SN PT  Pharmacy: [facility]  PCP:   Deanne Perrin contacted to follow for Home Health orders and sign the POC.  Awaiting return call.   2.11.25   Return call received. Deanne Perrin per [facility] will follow for home health orders.

## 2025-02-10 NOTE — DISCHARGE PLACEMENT REQUEST
"Alverto Peralta (59 y.o. Male)       Date of Birth   1965    Social Security Number       Address   [address] [place], [place] [address] [address]    Home Phone   [phone]    MRN   [id number]       Caodaism   None    Marital Status                               Admission Date   2/3/25    Admission Type   Urgent    Admitting Provider   Dante Truong MD    Attending Provider   Eusebio Kidd MD    Department, Room/Bed   [facility] CARDIOVASCULAR CARE UNIT, 3124/1       Discharge Date       Discharge Disposition   Home or Self Care    Discharge Destination                                 Attending Provider: Eusebio Kidd MD    Allergies: Coconut, Keflex [Cephalexin]    Isolation: Droplet   Infection: Influenza (02/03/25)   Code Status: CPR    Ht: 175.3 cm (69\")   Wt: 74.7 kg (164 lb 11.2 oz)    Admission Cmt: None   Principal Problem: Acute on chronic respiratory failure [J96.20]                   Active Insurance as of 2/3/2025       Primary Coverage       Payor Plan Insurance Group Employer/Plan Group    MEDICARE MEDICARE A & B        Payor Plan Address Payor Plan Phone Number Payor Plan Fax Number Effective Dates    [address] [phone]  10/1/2024 - None Entered    [facility] [id number]         Subscriber Name Subscriber Birth Date Member ID       ALVERTO PERALTA 1965 [id number]               Secondary Coverage       Payor Plan Insurance Group Employer/Plan Group    [id number]        Payor Plan Address Payor Plan Phone Number Payor Plan Fax Number Effective Dates    [address] [address]   2/3/2025 - None Entered    [place] [address]         Subscriber Name Subscriber Birth Date Member ID       ALVERTO PERALTA 1965 [id number]                     Emergency Contacts        (Rel.) Home Phone Work Phone Mobile Phone    ISAIAS PERALTA (Spouse) -- -- [phone]    NAOMIHEATHER (Daughter) -- -- [phone]                "

## 2025-02-10 NOTE — PLAN OF CARE
Goal Outcome Evaluation:         Pt A&Ox4, VS stable, pt on RA. Pt mobilizes with a walker, instructions on how to obtain provided. Home health to see patient, discharging home.

## 2025-02-10 NOTE — PROGRESS NOTES
Cardiology Progress Note      PATIENT IDENTIFICATION    Name: Chu Peralta  Age: 59 y.o. Sex: male : 1965  MRN: [id number]    Requesting Provider    Eusebio Kidd MD     LOS: 7 days     Subjective:    Interval History:  No significant overnight events.  Patient reports he is breathing much better, denies chest pain, shortness of breath, palpitations, dizziness or lightheadedness.    Review of Systems:  A complete review of systems was undertaken with the pertinent cardiovascular findings listed in history of present illness and all other systems being negative.     Assessment & Plan    Impressions:  CAD status post PCI to RCA in the setting of STEMI on 2/3/25 (Dr Peterson)  Heart failure with reduced ejection fraction, EF 20-25  Hypertension  Hyperlipidemia  COPD  Influenza A pneumonia    Recommendations:  GDMT, continue Entresto, Toprol, Jardiance and will consider adding spironolactone as outpatient.  Continue DAPT,currently on aspirin and Brilinta, for at least 1 year from his STEMI date   Continue high intensity statin  Patient had an episode of paroxysmal A-fib, but has remained in sinus rhythm over the past several days.  Has not been placed on anticoagulation.  Patient was placed on amiodarone oral 200 mg twice daily.  Recommend 30 days Holter monitor upon discharge  to evaluate possible A-fib recurrence and reassess indication for anticoagulation as outpatient.      Objective:    Medication Review:   Scheduled Meds:amiodarone, 200 mg, Oral, Q12H  aspirin, 81 mg, Oral, Daily  atorvastatin, 40 mg, Oral, Nightly  budesonide, 0.5 mg, Nebulization, BID - RT  empagliflozin, 10 mg, Oral, Daily  enoxaparin, 1 mg/kg, Subcutaneous, Q12H  escitalopram, 20 mg, Oral, Daily  metoprolol succinate XL, 25 mg, Oral, Daily  senna-docusate sodium, 2 tablet, Oral, BID   And  polyethylene glycol, 17 g, Oral, Daily  sacubitril-valsartan, 1 tablet, Oral, Q12H  sodium chloride, 10 mL, Intravenous, Q12H  ticagrelor, 90  mg, Oral, BID      Continuous Infusions:   PRN Meds:.  acetaminophen    aluminum-magnesium hydroxide-simethicone    senna-docusate sodium **AND** polyethylene glycol **AND** bisacodyl **AND** bisacodyl    dextrose    dextrose    diphenhydrAMINE    glucagon (human recombinant)    ipratropium-albuterol    melatonin    nicotine    nitroglycerin    ondansetron ODT **OR** ondansetron    sodium chloride    sodium chloride      Acute on chronic respiratory failure    STEMI involving right coronary artery         Physical Exam:  General: Alert, cooperative, no distress, appears stated age  Lungs:  Clear to auscultation bilaterally, no wheezes, rhonchi or rales are noted  Chest wall: No tenderness  Heart::  Regular rate and rhythm, S1 and S2 normal, no murmur.  No rub or gallop  Abdomen: Soft, nontender, nondistended, bowel sounds active  Extremities: No cyanosis, clubbing, or edema  Pulses: 2+ and symmetric all extremities  Neuro/psych: No gross focal deficits      Vital Signs:  Vitals:    02/10/25 0700 02/10/25 0706 02/10/25 0709 02/10/25 0739   BP: 99/63   114/68   BP Location:    Left arm   Patient Position:    Sitting   Pulse: 67 67 69    Resp:  18 18 24   Temp:    97.6 °F (36.4 °C)   TempSrc:    Oral   SpO2: 93% 94% 94%    Weight:       Height:         Wt Readings from Last 1 Encounters:   02/10/25 74.7 kg (164 lb 11.2 oz)       Intake/Output Summary (Last 24 hours) at 2/10/2025 1036  Last data filed at 2/10/2025 0830  Gross per 24 hour   Intake 975 ml   Output 1425 ml   Net -450 ml         Results Review:     CBC    Results from last 7 days   Lab Units 02/10/25  0524 02/09/25  0425 02/08/25  0520 02/07/25  0420 02/06/25  0310 02/05/25  1836 02/05/25  0415 02/04/25  0518   WBC 10*3/mm3 9.09 8.24 8.64 8.38 12.39*  --  18.99* 18.70*   HEMOGLOBIN g/dL 13.6 13.2 11.3* 10.6* 10.4*  --  10.5* 12.0*   HEMOGLOBIN, POC g/dL  --   --   --   --   --  13.3  --   --    PLATELETS 10*3/mm3 317 262 238 230 239  --  283 330     Cr  Clearance Estimated Creatinine Clearance: 122.3 mL/min (A) (by C-G formula based on SCr of 0.67 mg/dL (L)).  Coag   Results from last 7 days   Lab Units 02/10/25  0524 02/09/25  0425 02/08/25  0520 02/07/25  0813 02/06/25  2358 02/06/25  1609 02/06/25  0938 02/04/25  0518 02/03/25  1716   INR   --   --   --   --   --   --  1.05  --  1.21*   APTT seconds 38.2* 41.4* >200.0* 51.0* 60.6* 102.7* 35.2   < > >200.0*    < > = values in this interval not displayed.     HbA1C   Lab Results   Component Value Date    HGBA1C 6.06 (H) 02/03/2025     Blood Glucose   Glucose   Date/Time Value Ref Range Status   02/09/2025 0040 116 (H) 70 - 105 mg/dL Final     Comment:     Serial Number: [id number]:  [id number]   02/08/2025 1202 134 (H) 70 - 105 mg/dL Final     Comment:     Serial Number: [id number]:  [id number]   02/07/2025 2355 119 (H) 70 - 105 mg/dL Final     Comment:     Serial Number: [id number]:  [id number]   02/07/2025 1547 126 (H) 70 - 105 mg/dL Final     Comment:     Serial Number: [id number]:  [id number]   02/07/2025 1252 115 (H) 70 - 105 mg/dL Final     Comment:     Serial Number: [id number]:  [id number]     Infection   Results from last 7 days   Lab Units 02/03/25  1806 02/03/25  1716   BLOODCX  No growth at 5 days No growth at 5 days   PROCALCITONIN ng/mL  --  37.40*     CMP   Results from last 7 days   Lab Units 02/10/25  0524 02/09/25  0425 02/08/25  0520 02/07/25  0420 02/06/25  0310 02/05/25  2144 02/05/25  1836 02/05/25  0415 02/04/25  0518 02/03/25 2035 02/03/25  1716   SODIUM mmol/L 139 136 137 139 139 139  --  138 139  --  135*   POTASSIUM mmol/L 4.1 4.1 4.4 4.3 4.6 4.7  --  3.8 4.7   < > 5.1   CHLORIDE mmol/L 103 103 100 103 103 103  --  106 105  --  101   CO2 mmol/L 25.1 23.7 29.0 30.6* 28.0 28.6  --  24.0 21.1*  --  25.4   BUN mg/dL 20 18 27* 27* 24* 21  --  18 20  --  11   CREATININE mg/dL 0.67* 0.49* 0.63* 0.55* 0.62* 0.64* 0.82 0.50* 1.02   < > 0.91   GLUCOSE mg/dL 90 93 86  "82 96 115*  --  153* 209*  --  186*   ALBUMIN g/dL 2.9* 2.8* 2.8* 2.8* 3.0*  --   --  3.1* 3.8  --  4.0   BILIRUBIN mg/dL 0.3 0.4 0.3 0.2 <0.2  --   --  <0.2 <0.2  --  0.2   ALK PHOS U/L 52 50 50 50 51  --   --  48 58  --  66   AST (SGOT) U/L 46* 41* 57* 81* 124*  --   --  169* 183*  --  84*   ALT (SGPT) U/L 38 29 35 38 50*  --   --  56* 49*  --  30   AMYLASE U/L  --   --   --   --   --   --   --   --   --   --  64   LIPASE U/L  --   --   --   --   --   --   --   --   --   --  20    < > = values in this interval not displayed.     ABG    Results from last 7 days   Lab Units 02/05/25 2000 02/05/25  1836 02/05/25  0336 02/04/25  0733 02/04/25  0250 02/03/25  2355 02/03/25  2248   PH, ARTERIAL pH units 7.360 7.180* 7.478* 7.358 7.295* 7.160* 7.123*   PCO2, ARTERIAL mm Hg 56.8* 74.7* 37.2 42.9 49.1* 65.0* 78.0*   PO2 ART mm Hg 106.8 201.2* 120.0* 180.0* 97.0 143.1* 74.5*   O2 SATURATION ART % 97.7 99.4* 99.0* 99.5* 96.6 98.3* 88.0*   BASE EXCESS ART mmol/L 5.1* -2.2* 3.9* -1.4* -3.1* -6.5* -5.7*     UA    Results from last 7 days   Lab Units 02/03/25  1730   NITRITE UA  Negative   WBC UA /HPF 0-2   BACTERIA UA /HPF None Seen   SQUAM EPITHEL UA /HPF None Seen     LILIANE  No results found for: \"POCMETH\", \"POCAMPHET\", \"POCBARBITUR\", \"POCBENZO\", \"POCCOCAINE\", \"POCOPIATES\", \"POCOXYCODO\", \"POCPHENCYC\", \"POCPROPOXY\", \"POCTHC\", \"POCTRICYC\"  Lysis Labs   Results from last 7 days   Lab Units 02/10/25  0524 02/09/25  0425 02/08/25  0520 02/07/25  0813 02/07/25  0420 02/06/25  2358 02/06/25  1609 02/06/25  0938 02/06/25  0310 02/05/25  2144 02/05/25  1836 02/05/25  0415 02/04/25  0518 02/03/25  2035 02/03/25  1716   INR   --   --   --   --   --   --   --  1.05  --   --   --   --   --   --  1.21*   APTT seconds 38.2* 41.4* >200.0* 51.0*  --  60.6* 102.7* 35.2 38.8*  --   --  37.2* 32.9  --  >200.0*   HEMOGLOBIN g/dL 13.6 13.2 11.3*  --  10.6*  --   --   --  10.4*  --   --  10.5* 12.0*  --  12.6*   HEMOGLOBIN, POC g/dL  --   --   --   " --   --   --   --   --   --   --  13.3  --   --    < >  --    PLATELETS 10*3/mm3 317 262 238  --  230  --   --   --  239  --   --  283 330  --  336   CREATININE mg/dL 0.67* 0.49* 0.63*  --  0.55*  --   --   --  0.62* 0.64* 0.82 0.50* 1.02   < > 0.91    < > = values in this interval not displayed.     Radiology(recent) No radiology results for the last day      Results from last 7 days   Lab Units 02/05/25 2000 02/04/25 0518 02/03/25  1716   CK TOTAL U/L  --   --  1,076*   HSTROP T ng/L 1,957*   < > 1,288*    < > = values in this interval not displayed.       Imaging Results (Last 24 Hours)       ** No results found for the last 24 hours. **            Cardiac Studies:  Echo- Results for orders placed during the hospital encounter of 02/03/25    Adult Transthoracic Echo Complete w/ Color, Spectral and Contrast if Necessary Per Protocol    Interpretation Summary    Left ventricular systolic function is severely decreased. Left ventricular ejection fraction appears to be 26 - 30%.    Left ventricular diastolic function is consistent with (grade I) impaired relaxation.    Moderately reduced right ventricular systolic function noted.    The right ventricular cavity is mildly dilated.    Estimated right ventricular systolic pressure from tricuspid regurgitation is normal (<35 mmHg). Calculated right ventricular systolic pressure from tricuspid regurgitation is 30 mmHg.    Stress Myoview-  Cath-        Eagle Ley MD  02/10/25  10:36 EST    Copied information has been reviewed and is current as of 02/10/25

## 2025-02-10 NOTE — CASE MANAGEMENT/SOCIAL WORK
Case Management Discharge Note      Final Note: Home - [facility] SN/PT         Selected Continued Care - Discharged on 2/10/2025 Admission date: 2/3/2025 - Discharge disposition: Home or Self Care       Transportation Services  Private: Car    Final Discharge Disposition Code: 06 - home with home health care    MARIJA Alexis RN  ICU/CVU   O: [phone]  C: [phone]  [email or web address]

## 2025-02-10 NOTE — CASE MANAGEMENT/SOCIAL WORK
Continued Stay Note  DEE Yoder     Patient Name: Chu Peralta  MRN: [id number]  Today's Date: 2/10/2025    Admit Date: 2/3/2025    Plan: Plan to dc home with spouse, [facility] SN/PT.   Discharge Plan       Row Name 02/10/25 1057       Plan    Plan Plan to dc home with spouse, [facility] SN/PT.    Plan Comments Cm spoke with spouse on phone, [employer] handout given for RW and Shower chair, or any other equipment needed (did not want to use DME 5 year slot in case a WC is needed).                 Expected Discharge Date and Time       Expected Discharge Date Expected Discharge Time    Feb 10, 2025               MARIJA Alexis RN  ICU/CVU   O: [phone]  C: [phone]  [email or web address]

## 2025-02-10 NOTE — DISCHARGE SUMMARY
[facility] Medicine Services  Discharge Summary    Date of Service: 2/10/2025  Patient Name: Chu Peralta  : 1965  MRN: [id number]    Date of Admission: 2/3/2025  Discharge Diagnosis: Acute on chronic respiratory failure  Date of Discharge: 2/10/2025  Primary Care Physician: Deanne Perrin APRN      Presenting Problem:   STEMI involving right coronary artery [I21.11]    Active and Resolved Hospital Problems:  Active Hospital Problems    Diagnosis POA    **Acute on chronic respiratory failure [J96.20] Yes    STEMI involving right coronary artery [I21.11] Unknown      Resolved Hospital Problems   No resolved problems to display.       #STEMI (RCA)  #HFrEF (15-20%)  #AF with RVR  Pulse  Av.6  Min: 79  Max: 142  Resp  Av.4  Min: 22  Max: 39  SpO2  Av.7 %  Min: 84 %  Max: 100 %  Systolic (24hrs), Av , Min:86 , Max:139   Diastolic (24hrs), Av, Min:53, Max:81  -s/p cath with RCA stent placement on 2/3   -ASA/Brillinta  -Statin  -Was briefly on dobutamine 2/4  -Jardiance 2/5  -Amio loaded, now on PO  -Digoxin once yesterday, now in sinus  -Restart metop  -Lactic cleared  -Therapeutic lovenox  -Continue to monitor  JOSE?DS?-VASc Score for Atrial Fibrillation Stroke Risk - MDCalc-Calculated on 2025 8:56 AM  4 points -> Stroke risk was 4.8% per year in >90,000 patients (the [place] Atrial Fibrillation Cohort Study) and 6.7% risk of stroke/TIA/systemic embolism.     #COPD  #Hypoxic, hypercapenic respiratory failure  -methylpred  -Alternates from bipap to NC  -40 lasix this AM  -Nebs  -Maintain O2 sats >92%  -CXR showing: No significant consolidations, PTX, or effusions     #hyperglycemia-BG   -Elevated A1c, no history of DM  -Steroids exacerbating symptoms  -Goal  140-180  -Accuchecks and SSI     ID-wbc 18>8.3   Temp (24hrs), Av °F (36.7 °C), Min:97.5 °F (36.4 °C), Max:98.6 °F (37 °C)  #Shock  #Possible pneumonia  #influenza A  -Leukocytosis to 25 on  presentation  -CT chest c/f multifocal pneumonia at OSH  -negative strep/legionella antigen   -No evidence of infection in urine  -RVP positive for influenza A.  Per wife symptoms has been going on >48 hours. No indications for tamiflu at this time  -Completed course of CTX  -Monitor for signs of infection  -This patient does not have an active medication from one of the medication groupers.     GI/Nutrition  #No acute issues  Diet: Cardiac; Healthy Heart (2-3 Na+); Fluid Consistency: Thin (IDDSI 0)  Patient isn't on Tube Feeding   Bowel regimen: docusate/miralax prn  Stress ulcer ppx:lansoprazole  -Cleared to swallow     Renal/-creat 0.5   #No acute issues  -Monitor electrolytes and UOP  -Monitor nephrotoxic agents administered  -Mims- removed  -lasix 40 this AM     Heme/Onc-hgl 10.6   #No acute issues  -Usual transfusion parameters (HgB <7, Plt <10 unless procedures or bleeding)  -DVT PPX: VTE Prophylaxis:Therapeutic lovenox  Pharmacologic VTE prophylaxis orders are present.  MSK/Skin  #No acute issues  -PT/OT  -Media tab for wound pictures       Hospital Course     HPI:  Chu Peralta is a 59 y.o. male with PMH of COPD, Chronic hypoxic respiratory failure on home 2 L, tobacco use disorder, RLS who presented to the hospital for respiratory distress and was admitted on 2/3/2025  4:39 PM with a principal diagnosis of Acute on chronic respiratory failure.       Patient was initially brought into an outside hospital and was in respiratory distress.  Placed on BiPAP.  Initially was DNI however rescinded the DNR and was subsequently intubated.  Patient was being treated with antibiotics for multifocal pneumonia as well as influenza A.  Patient was given broad-spectrum antibiotics and transferred to [facility] for further care.     Upon arrival to [facility] the patient was noted to have concerning EKG for acute coronary syndrome and went emergently to the Cath Lab with cardiology.  He had an RCA  occlusion with 1 stent placed.  Postoperative EF was around 15%.  Patient was admitted to the ICU for further care.     Chu Peralta is now Hospital Day: 5     Significant Events / Subjective      24 hour events 02/07/25 : HFNC in daytime, BIPAP overnight.  No vasopressors, given amiodarone and digoxin yesterday, no longer in fib.  Transition heparin gtt to lovenox  2/8 patient seen and examined in bed no acute distress, dyspnea on 2 L, weakness or fatigue, family at bedside,CM spoke with spouse on the phone, declines SNF at this time, would like to take him home with daughter's help and HH.   2/9 seen in bed NAD, vss, awaiting pt/ot wife wants to take patient home,   2/10 patient seen and examined in medical distress, doing better today, will discharge patient home.  Condition at discharge stable.  Plan: Declining SNF. Referral to [facility]; pending acceptance. Order per MD. Pt requesting rollator and BSC for dc.   DISCHARGE Follow Up Recommendations for labs and diagnostics: Follow-up with PCP in a week.  Follow-up with cardiology in 2 weeks.  Day of Discharge     Vital Signs:  Temp:  [97.5 °F (36.4 °C)-97.8 °F (36.6 °C)] 97.6 °F (36.4 °C)  Heart Rate:  [67-99] 69  Resp:  [17-32] 24  BP: ()/(62-83) 114/68  Flow (L/min) (Oxygen Therapy):  [2-4] 2    Physical Exam   GENERAL APPEARANCE:  Sitting up in bed on HFNC with mild tachypnea  HEENT:  Normal conjunctivae, normal eyelids  NECK:  trachea midline  HEART: Tachycardia but regular rhythm  LUNGS:  CTAB, no wheezing, tachypnea  ABDOMEN:  Soft, nontender, nondistended   :no badillo  EXTREMITIES:  trace pedal edema  NEUROLOGICAL: Awake/alert/interactive  Skin:  Strong distal pulses, warm feet      Pertinent  and/or Most Recent Results     LAB RESULTS:      Lab 02/10/25  0524 02/09/25  0425 02/08/25  0520 02/07/25  0813 02/07/25  0420 02/06/25  2358 02/06/25  1705 02/06/25  1609 02/06/25  0938 02/06/25  0310 02/05/25  2144 02/05/25  1836 02/05/25  1226 02/05/25  0415  02/04/25  2351 02/04/25  0800 02/04/25  0518 02/03/25 2035 02/03/25  1716   WBC 9.09 8.24 8.64  --  8.38  --   --   --   --  12.39*  --   --   --  18.99*  --   --  18.70*  --  25.64*   HEMOGLOBIN 13.6 13.2 11.3*  --  10.6*  --   --   --   --  10.4*  --   --   --  10.5*  --   --  12.0*  --  12.6*   HEMOGLOBIN, POC  --   --   --   --   --   --   --   --   --   --   --  13.3  --   --   --   --   --    < >  --    HEMATOCRIT 42.4 41.4 35.7*  --  33.5*  --   --   --   --  33.6*  --   --   --  32.5*  --   --  37.6  --  40.6   HEMATOCRIT POC  --   --   --   --   --   --   --   --   --   --   --  39  --   --   --   --   --    < >  --    PLATELETS 317 262 238  --  230  --   --   --   --  239  --   --   --  283  --   --  330  --  336   NEUTROS ABS 6.54 6.67 6.97  --  6.89  --   --   --   --  10.99*  --   --   --  16.89*  --   --  16.90*  --  23.69*   IMMATURE GRANS (ABS)  --   --  0.05  --  0.04  --   --   --   --  0.07*  --   --   --  0.09*  --   --  0.20*  --  0.30*   LYMPHS ABS  --   --  0.75  --  0.78  --   --   --   --  0.48*  --   --   --  0.52*  --   --  0.53*  --  0.40*   MONOS ABS  --   --  0.86  --  0.66  --   --   --   --  0.83  --   --   --  1.47*  --   --  1.06*  --  1.17*   EOS ABS  --   --  0.00  --  0.00  --   --   --   --  0.00  --   --   --  0.00  --   --  0.00  --  0.01   MCV 87.6 89.8 91.3  --  92.5  --   --   --   --  93.1  --   --   --  89.3  --   --  91.3  --  92.7   PROCALCITONIN  --   --   --   --   --   --   --   --   --   --   --   --   --   --   --   --   --   --  37.40*   LACTATE  --   --   --   --   --   --  0.7  --   --   --  1.3 4.5* 0.9  --  1.9   < > 3.0*   < > 1.9   PROTIME  --   --   --   --   --   --   --   --  13.7  --   --   --   --   --   --   --   --   --  15.3*   APTT 38.2* 41.4* >200.0* 51.0*  --  60.6*  --    < > 35.2 38.8*  --   --   --  37.2*  --   --  32.9  --  >200.0*    < > = values in this interval not displayed.         Lab 02/10/25  0524 02/09/25  0425 02/08/25  0520  02/07/25  0420 02/06/25  0310 02/03/25 2035 02/03/25  1716   SODIUM 139 136 137 139 139   < > 135*   POTASSIUM 4.1 4.1 4.4 4.3 4.6   < > 5.1   CHLORIDE 103 103 100 103 103   < > 101   CO2 25.1 23.7 29.0 30.6* 28.0   < > 25.4   ANION GAP 10.9 9.3 8.0 5.4 8.0   < > 8.6   BUN 20 18 27* 27* 24*   < > 11   CREATININE 0.67* 0.49* 0.63* 0.55* 0.62*   < > 0.91   EGFR 106.2 116.8 108.2 112.8 108.7   < > 95.9   GLUCOSE 90 93 86 82 96   < > 186*   CALCIUM 8.4* 8.3* 8.2* 8.2* 8.5*   < > 8.7   IONIZED CALCIUM  --   --   --   --   --   --  1.14*   MAGNESIUM 2.0 2.0 2.2 2.4 2.7*   < > 2.3   PHOSPHORUS 4.0 3.3 3.8 2.7 3.0   < > 5.1*   HEMOGLOBIN A1C  --   --   --   --   --   --  6.06*   TSH  --   --   --   --   --   --  0.291    < > = values in this interval not displayed.         Lab 02/10/25  0524 02/09/25 0425 02/08/25  0520 02/07/25  0420 02/06/25 0310 02/05/25 0415 02/04/25  0518 02/03/25  1716   TOTAL PROTEIN 5.9* 5.3* 5.6* 5.3* 5.4*   < > 6.9 6.8   ALBUMIN 2.9* 2.8* 2.8* 2.8* 3.0*   < > 3.8 4.0   GLOBULIN 3.0 2.5 2.8 2.5 2.4   < >  --  2.8   ALT (SGPT) 38 29 35 38 50*   < > 49* 30   AST (SGOT) 46* 41* 57* 81* 124*   < > 183* 84*   BILIRUBIN 0.3 0.4 0.3 0.2 <0.2   < > <0.2 0.2   BILIRUBIN DIRECT  --   --   --   --   --   --  0.1  --    ALK PHOS 52 50 50 50 51   < > 58 66   AMYLASE  --   --   --   --   --   --   --  64   LIPASE  --   --   --   --   --   --   --  20    < > = values in this interval not displayed.         Lab 02/06/25  0938 02/05/25 2000 02/05/25  1827 02/04/25  0518 02/03/25  1716   PROBNP  --   --   --   --  771.0   HSTROP T  --  1,957* 1,845* 2,970* 1,288*   PROTIME 13.7  --   --   --  15.3*   INR 1.05  --   --   --  1.21*         Lab 02/03/25  1716   CHOLESTEROL 111   LDL CHOL 64   HDL CHOL 35*   TRIGLYCERIDES 49             Lab 02/06/25  0308 02/05/25 2000 02/05/25  1836 02/05/25  0336   PH, ARTERIAL  --  7.360 7.180* 7.478*   PCO2, ARTERIAL  --  56.8* 74.7* 37.2   PO2 ART  --  106.8 201.2* 120.0*    O2 SATURATION ART  --  97.7 99.4* 99.0*   FIO2 35 35 60 40   HCO3 ART  --  32.1* 27.9 27.6   BASE EXCESS ART  --  5.1* -2.2* 3.9*     Brief Urine Lab Results  (Last result in the past 365 days)        Color   Clarity   Blood   Leuk Est   Nitrite   Protein   CREAT   Urine HCG        02/03/25 1730 Yellow   Clear   Moderate (2+)   Negative   Negative   100 mg/dL (2+)                 Microbiology Results (last 10 days)       Procedure Component Value - Date/Time    Blood Culture - Blood, Arm, Right [[id number]]  (Normal) Collected: 02/03/25 1806    Lab Status: Final result Specimen: Blood from Arm, Right Updated: 02/08/25 1815     Blood Culture No growth at 5 days    MRSA Screen, PCR (Inpatient) - Swab, Nares [[id number]]  (Normal) Collected: 02/03/25 1731    Lab Status: Final result Specimen: Swab from Nares Updated: 02/03/25 1848     MRSA PCR No MRSA Detected    Narrative:      The negative predictive value of this diagnostic test is high and should only be used to consider de-escalating anti-MRSA therapy. A positive result may indicate colonization with MRSA and must be correlated clinically.    Respiratory Panel PCR w/COVID-19(SARS-CoV-2) [facility]/JEN/FRAN/PAD/COR/ABE In-House, NP Swab in UTM/VTM, 2 HR TAT - Swab, Nasopharynx [[id number]]  (Abnormal) Collected: 02/03/25 1731    Lab Status: Final result Specimen: Swab from Nasopharynx Updated: 02/03/25 1822     ADENOVIRUS, PCR Not Detected     Coronavirus 229E Not Detected     Coronavirus HKU1 Not Detected     Coronavirus NL63 Not Detected     Coronavirus OC43 Not Detected     COVID19 Not Detected     Human Metapneumovirus Not Detected     Human Rhinovirus/Enterovirus Not Detected     Influenza A H1 2009 PCR Detected     Influenza B PCR Not Detected     Parainfluenza Virus 1 Not Detected     Parainfluenza Virus 2 Not Detected     Parainfluenza Virus 3 Not Detected     Parainfluenza Virus 4 Not Detected     RSV, PCR Not Detected     Bordetella pertussis pcr Not Detected      Bordetella parapertussis PCR Not Detected     Chlamydophila pneumoniae PCR Not Detected     Mycoplasma pneumo by PCR Not Detected    Narrative:      In the setting of a positive respiratory panel with a viral infection PLUS a negative procalcitonin without other underlying concern for bacterial infection, consider observing off antibiotics or discontinuation of antibiotics and continue supportive care. If the respiratory panel is positive for atypical bacterial infection (Bordetella pertussis, Chlamydophila pneumoniae, or Mycoplasma pneumoniae), consider antibiotic de-escalation to target atypical bacterial infection.    Legionella Antigen, Urine - Urine, Urine, Catheter In/Out [[id number]]  (Normal) Collected: 02/03/25 1730    Lab Status: Final result Specimen: Urine, Catheter In/Out Updated: 02/03/25 1821     LEGIONELLA ANTIGEN, URINE Negative    S. Pneumo Ag Urine or CSF - Urine, Urine, Catheter In/Out [[id number]]  (Normal) Collected: 02/03/25 1730    Lab Status: Final result Specimen: Urine, Catheter In/Out Updated: 02/03/25 1821     Strep Pneumo Ag Negative    Blood Culture - Blood, Arm, Left [[id number]]  (Normal) Collected: 02/03/25 1716    Lab Status: Final result Specimen: Blood from Arm, Left Updated: 02/08/25 1731     Blood Culture No growth at 5 days            XR Chest 1 View    Result Date: 2/7/2025  Impression: Impression: No significant interval change. Electronically Signed: Rk Medina MD  2/7/2025 7:57 AM EST  Workstation ID: [id number]    XR Chest 1 View    Result Date: 2/6/2025  Impression: Impression: No significant interval change. Electronically Signed: Alvaro Loera MD  2/6/2025 7:58 AM EST  Workstation ID: [id number]    XR Chest 1 View    Result Date: 2/5/2025  Impression: Impression: 1.Interval extubation. 2.Persistent diffuse interstitial opacities with perihilar and left midlung airspace opacities. Electronically Signed: Alvaro Loera MD  2/5/2025 7:08 PM EST  Workstation ID: [id number]    XR  Chest 1 View    Result Date: 2/5/2025  Impression: Impression: 1. Lines and support tubes in expected location. 2. No pneumothorax. 3. Clear lungs. Electronically Signed: Celso Cox MD  2/5/2025 7:16 AM EST  Workstation ID: [id number]    XR Chest 1 View    Result Date: 2/4/2025  Impression: Impression: 1.New right internal jugular central venous catheter terminates in the mid SVC. 2.Gastric suction tube terminates in the stomach with the sideport just below the gastroesophageal junction. 3.Stable endotracheal tube. 4.No acute cardiopulmonary abnormality. Electronically Signed: Alvaro Poe MD  2/4/2025 5:11 AM EST  Workstation ID: [id number]    XR Abdomen KUB    Result Date: 2/4/2025  Impression: Impression: Gastric suction tube terminates in the proximal stomach. Electronically Signed: Alvaro Poe MD  2/4/2025 1:52 AM EST  Workstation ID: [id number]    XR Chest 1 View    Result Date: 2/3/2025  Impression: Impression: 1.The tip of the endotracheal tube is in good position terminating at the level of the aortic knob. 2.Emphysema. Electronically Signed: Perez Veronica MD  2/3/2025 5:50 PM EST  Workstation ID: [id number]             Results for orders placed during the hospital encounter of 02/03/25    Adult Transthoracic Echo Complete w/ Color, Spectral and Contrast if Necessary Per Protocol    Interpretation Summary    Left ventricular systolic function is severely decreased. Left ventricular ejection fraction appears to be 26 - 30%.    Left ventricular diastolic function is consistent with (grade I) impaired relaxation.    Moderately reduced right ventricular systolic function noted.    The right ventricular cavity is mildly dilated.    Estimated right ventricular systolic pressure from tricuspid regurgitation is normal (<35 mmHg). Calculated right ventricular systolic pressure from tricuspid regurgitation is 30 mmHg.      Labs Pending at Discharge:  Pending Results       Procedure [Order ID] Specimen - Date/Time     Respiratory Culture - Sputum, ET Suction [[id number]]     Specimen: Sputum from ET Suction             Procedures Performed  Procedure(s):  Left Heart Cath  Percutaneous Coronary Intervention  Stent SHELLY coronary         Consults:   Consults       Date and Time Order Name Status Description    2/7/2025  4:17 PM Inpatient Hospitalist Consult      2/3/2025  5:43 PM Inpatient Cardiology Consult Completed     2/3/2025  5:00 PM Inpatient Cardiology Consult            Assessment & Plan     Impressions:  CAD status post PCI to RCA in the setting of STEMI on 2/3/25 (Dr Peterson)  Heart failure with reduced ejection fraction, EF 20-25  Hypertension  Hyperlipidemia  COPD  Influenza A pneumonia     Recommendations:  GDMT, continue Entresto, Toprol, Jardiance and will consider adding spironolactone as outpatient.  Continue DAPT,currently on aspirin and Brilinta, for at least 1 year from his STEMI date   Continue high intensity statin  Patient had an episode of paroxysmal A-fib, but has remained in sinus rhythm over the past several days.  Has not been placed on anticoagulation.  Patient was placed on amiodarone oral 200 mg twice daily.  Recommend 30 days Holter monitor upon discharge  to evaluate possible A-fib recurrence and reassess indication for anticoagulation as outpatient.       Discharge Details        Discharge Medications        New Medications        Instructions Start Date   amiodarone 200 MG tablet  Commonly known as: PACERONE   200 mg, Oral, Every 12 Hours Scheduled      aspirin 81 MG EC tablet   81 mg, Oral, Daily   Start Date: February 11, 2025     atorvastatin 40 MG tablet  Commonly known as: LIPITOR   40 mg, Oral, Nightly      empagliflozin 10 MG tablet tablet  Commonly known as: JARDIANCE   10 mg, Oral, Daily   Start Date: February 11, 2025     nicotine 21 MG/24HR patch  Commonly known as: NICODERM CQ   1 patch, Transdermal, Daily PRN      sacubitril-valsartan 24-26 MG tablet  Commonly known as: ENTRESTO   1  tablet, Oral, Every 12 Hours Scheduled      ticagrelor 90 MG tablet tablet  Commonly known as: BRILINTA   90 mg, Oral, 2 Times Daily             Continue These Medications        Instructions Start Date   albuterol sulfate  (90 Base) MCG/ACT inhaler  Commonly known as: PROVENTIL HFA;VENTOLIN HFA;PROAIR HFA   2 puffs, Inhalation, Every 4 Hours PRN      escitalopram 20 MG tablet  Commonly known as: LEXAPRO   TAKE 1 TABLET BY MOUTH ONCE DAILY      Fluticasone Furoate-Vilanterol 100-25 MCG/INH inhaler  Commonly known as: Breo Ellipta   1 puff, Inhalation, Daily      metoprolol succinate XL 25 MG 24 hr tablet  Commonly known as: TOPROL-XL   25 mg, Oral, Daily      theophylline 400 MG 24 hr tablet  Commonly known as: UNIPHYL   400 mg, Oral, Daily             Stop These Medications      lisinopril 10 MG tablet  Commonly known as: PRINIVIL,ZESTRIL     lisinopril-hydrochlorothiazide 20-12.5 MG per tablet  Commonly known as: PRINZIDE,ZESTORETIC              Allergies   Allergen Reactions    Coconut Unknown - High Severity    Keflex [Cephalexin] Unknown - Low Severity         Discharge Disposition:   Home or Self Care    Diet:  Hospital:  Diet Order   Procedures    Diet: Cardiac; Healthy Heart (2-3 Na+); Fluid Consistency: Thin (IDDSI 0)         Discharge Activity:   Activity Instructions       Gradually Increase Activity Until at Pre-Hospitalization Level                CODE STATUS:  Code Status and Medical Interventions: CPR (Attempt to Resuscitate); Full Support   Ordered at: 02/03/25 8685     Level Of Support Discussed With:    Patient     Code Status (Patient has no pulse and is not breathing):    CPR (Attempt to Resuscitate)     Medical Interventions (Patient has pulse or is breathing):    Full Support         No future appointments.    Additional Instructions for the Follow-ups that You Need to Schedule       Discharge Follow-up with PCP   As directed       Currently Documented PCP:    Deanne Perrin, ROLA     PCP Phone Number:    [phone]     Follow Up Details: 1 week        Discharge Follow-up with Specified Provider: cardiology; 2 Weeks   As directed      To: cardiology   Follow Up: 2 Weeks                Time spent on Discharge including face to face service:  35 minutes    Signature: Electronically signed by Eusebio Kidd MD, 02/10/25, 08:54 EST.  [facility]ist Team

## 2025-02-11 ENCOUNTER — HOME CARE VISIT (OUTPATIENT)
Dept: HOME HEALTH SERVICES | Facility: HOME HEALTHCARE | Age: 60
End: 2025-02-11

## 2025-02-11 ENCOUNTER — TELEPHONE (OUTPATIENT)
Dept: CARDIOLOGY | Facility: CLINIC | Age: 60
End: 2025-02-11

## 2025-02-11 ENCOUNTER — TELEPHONE (OUTPATIENT)
Dept: CARDIOLOGY | Facility: CLINIC | Age: 60
End: 2025-02-11
Payer: MEDICARE

## 2025-02-11 NOTE — CASE COMMUNICATION
SN called the home and spoke with the wife who stated due to snow and ice SN should not attempt to make visit today.  She states that they would prefer SN come on Thursday 2/13/25 for the admission visit.  Patient denies having any issues and states he has his medications, food and has no other needs at this time. Verbalized understanding to call us should anything change before SN comes on Thrusday.

## 2025-02-11 NOTE — OUTREACH NOTE
Prep Survey      Flowsheet Row Responses   [facility] facility patient discharged from? [facility]   Is LACE score < 7 ? No   Eligibility Readm Mgmt   Discharge diagnosis Acute on chronic respiratory failure, STEMI, heart cath with stent   Does the patient have one of the following disease processes/diagnoses(primary or secondary)? Acute MI (STEMI,NSTEMI)   Does the patient have Home health ordered? Yes   What is the Home health agency?  [facility]   Is there a DME ordered? No   Prep survey completed? Yes            Olga Lidia DEMPSEY - Registered Nurse

## 2025-02-11 NOTE — TELEPHONE ENCOUNTER
Caller: ISAIAS SALGADO    Relationship to patient: Emergency Contact    Best call back number:     Chief complaint: NA    Type of visit: HOSPITAL FU     Requested date: 02.24.25     If rescheduling, when is the original appointment: NA     Additional notes:PER DISCHARGE NOTES PATIENT IS TO BE SEEN ON 02.24.25, HUB UNABLE TO SCHEDULE FIRST AVAILABLE IS 03.27.25 PLEASE CALL AND ADVISE.

## 2025-02-11 NOTE — TELEPHONE ENCOUNTER
Hub staff attempted to follow warm transfer process and was unsuccessful     Caller: ISAIAS SALGADO    Relationship to patient: Emergency Contact    Best call back number: [phone]    Patient is needing: THEY ARE RETURNING [facility]. HUB UNABLE TO REACH OFFICE. PLS CALL AND ADVISE.

## 2025-02-13 ENCOUNTER — HOME CARE VISIT (OUTPATIENT)
Dept: HOME HEALTH SERVICES | Facility: HOME HEALTHCARE | Age: 60
End: 2025-02-13
Payer: MEDICARE

## 2025-02-13 VITALS
SYSTOLIC BLOOD PRESSURE: 140 MMHG | HEART RATE: 82 BPM | WEIGHT: 164 LBS | HEIGHT: 69 IN | RESPIRATION RATE: 18 BRPM | BODY MASS INDEX: 24.29 KG/M2 | DIASTOLIC BLOOD PRESSURE: 70 MMHG | TEMPERATURE: 97.7 F

## 2025-02-13 PROCEDURE — G0299 HHS/HOSPICE OF RN EA 15 MIN: HCPCS

## 2025-02-13 NOTE — CASE COMMUNICATION
"SOC Note: The patient is a 60 yo male with history of COPD and HTN, the patient has been on oxygen for a few months.  Patient states he got very SOB and felt like he could not get any air in. His daughter called 911 and he wasn't take to the ER where he was hypoxic and diagnosed with a STEMI.  Patient was discharged home with several medication changes and new diagnosis of diabetes as well as COPD excerbation, MI and HTN.  He was starte d on Jardiance but does not have a glucometer.  The family are very motivated and have a blood pressure cuff and 02 sat monitor, they will look into buying a glucometer.  Patient requires contact guard for ambulation and ADLS due to poor balance and extreme SOA.  Unable to obtain 02 sat patient has very cold hands.       Home Health ordered for: disciplines SN/PT    Reason for Hosp/Primary Dx/Co-morbidities: STEMI, HTN, DM,     Focus of  Care:MI    Patient's goal(s):\"Get back to where I was before more independent\"    Current Functional status/mobility/DME: walker, 02, CPAP, shower bench,     HB status/Living Arrangements: patient is homebound due to weakness, unsteady gait, SOB, he lives with his spouse    Skin Integrity/wound status: no issues    Code Status: Full code    Fall Risk/Safety concerns: at risk for falls    Educated on Emergency Plan, steps to take prior  to going to the ER and when to Call Home Health First:  review SOC packet with the patient and family    Medication issues/Concerns:no issues    Additional Problems/Concerns: need for glucometer does not have one in the home    SDOH Barriers (i.e. caregiver concerns, social isolation, transportation, food insecurity, environment, income etc.)/Need for MSW: NA  "

## 2025-02-13 NOTE — CASE COMMUNICATION
Noted major drug interactions on admission to home care also need to send to Deanne Perrin NP PCP.  Drug-Drug: escitalopram and amiodarone   Additive QT interval prolongation may occur during coadministration of escitalopram, a moderate-risk QT-prolonging agent, and amiodarone.   Details Major   escitalopram (LEXAPRO) 20 MG tablet     amiodarone (PACERONE) 200 MG tablet   Drug-Drug  <jscript:void(0)>  Drug-Drug: metoprolol succinate XL  and amiodarone   Administration of amiodarone and metoprolol succinate XL may result in severe bradycardia, hypotension and cardiac arrest.   Details Major   metoprolol succinate XL (TOPROL-XL) 25 MG 24 hr tablet   Long-term.     amiodarone (PACERONE) 200 MG tablet   Drug-Drug  <jscript:void(0)>  Drug-Drug: ticagrelor and aspirin   Additive antiplatelet effect may occur when aspirin is coadministered with ticagrelor. Additionally, aspir in may diminish the therapeutic effect of ticagrelor.   Details Major

## 2025-02-14 ENCOUNTER — READMISSION MANAGEMENT (OUTPATIENT)
Dept: CALL CENTER | Facility: HOSPITAL | Age: 60
End: 2025-02-14
Payer: MEDICARE

## 2025-02-14 ENCOUNTER — HOME CARE VISIT (OUTPATIENT)
Dept: HOME HEALTH SERVICES | Facility: HOME HEALTHCARE | Age: 60
End: 2025-02-14
Payer: MEDICARE

## 2025-02-14 LAB
QT INTERVAL: 379 MS
QTC INTERVAL: 488 MS

## 2025-02-14 NOTE — OUTREACH NOTE
AMI Week 1 Survey      Flowsheet Row Responses   [facility] patient discharged from? [facility]   Does the patient have one of the following disease processes/diagnoses(primary or secondary)? Acute MI (STEMI,NSTEMI)   Week 1 attempt successful? Yes   Call start time 1319   Call end time 1324   Discharge diagnosis Acute on chronic respiratory failure, STEMI, heart cath with stent   Meds reviewed with patient/caregiver? Yes   Is the patient having any side effects they believe may be caused by any medication additions or changes? No   Does the patient have all prescriptions related to this admission filled (includes statins,anticoagulants,HTN meds,anti-arrhythmia meds) Yes   Is the patient taking all medications as directed (includes completed medication regime)? Yes   Does the patient have a primary care provider?  Yes   Comments Patient has upcoming appt with Cardiology   What is the Home health agency?  [facility]   Psychosocial issues? No   Did the patient receive a copy of their discharge instructions? Yes   Nursing interventions Reviewed instructions with patient   What is the patient's perception of their health status since discharge? Improving   Nursing interventions Nurse provided patient education   Is the patient/caregiver able to teach back signs and symptoms of when to call for help immediately: Sudden chest discomfort, Sudden discomfort in arms, back, neck or jaw, Shortness of breath at any time, Sudden sweating or clammy skin, Nausea or vomiting, Dizziness or lightheadedness, Irregular or rapid heart rate   Nursing interventions Nurse provided patient education   Is the pateint /caregiver able to teach back the importance of cardiac rehab? Yes   Nursing interventions Provided education on importance of cardiac rehab   Is the patient/caregiver able to teach back ways to prevent a second heart attack: Take medications, Follow up with MD, Participate in Cardiac Rehab   If the patient is a current smoker, are  they able to teach back resources for cessation? Not a smoker  [Patient states he quit smoking]   Is the patient/caregiver able to teach back the hierarchy of who to call/visit for symptoms/problems? PCP, Specialist, Home health nurse, Urgent Care, ED, 911 Yes   Week 1 call completed? Yes   Is the patient interested in additional calls from an ambulatory ? No   Would this patient benefit from a Referral to Amb Social Work? No   Wrap up additional comments Patient states doing well. No questions or concerns at this time. HH following.   Call end time 1324            SIERRA WOOD - Registered Nurse

## 2025-02-17 ENCOUNTER — HOME CARE VISIT (OUTPATIENT)
Dept: HOME HEALTH SERVICES | Facility: HOME HEALTHCARE | Age: 60
End: 2025-02-17
Payer: MEDICARE

## 2025-02-17 VITALS
HEART RATE: 100 BPM | OXYGEN SATURATION: 98 % | SYSTOLIC BLOOD PRESSURE: 116 MMHG | TEMPERATURE: 98.2 F | RESPIRATION RATE: 20 BRPM | BODY MASS INDEX: 23.92 KG/M2 | DIASTOLIC BLOOD PRESSURE: 60 MMHG | WEIGHT: 162 LBS

## 2025-02-17 PROCEDURE — G0493 RN CARE EA 15 MIN HH/HOSPICE: HCPCS

## 2025-02-18 ENCOUNTER — HOME CARE VISIT (OUTPATIENT)
Dept: HOME HEALTH SERVICES | Facility: HOME HEALTHCARE | Age: 60
End: 2025-02-18
Payer: MEDICARE

## 2025-02-18 VITALS
SYSTOLIC BLOOD PRESSURE: 100 MMHG | OXYGEN SATURATION: 95 % | HEART RATE: 71 BPM | TEMPERATURE: 98 F | DIASTOLIC BLOOD PRESSURE: 65 MMHG

## 2025-02-18 PROCEDURE — G0151 HHCP-SERV OF PT,EA 15 MIN: HCPCS

## 2025-02-20 ENCOUNTER — HOME CARE VISIT (OUTPATIENT)
Dept: HOME HEALTH SERVICES | Facility: HOME HEALTHCARE | Age: 60
End: 2025-02-20
Payer: MEDICARE

## 2025-02-20 NOTE — CASE COMMUNICATION
SN was driving to the home when the spouse called and told SN that the roads are not clear and that if would not be safe to attempt to visit today.  She states he seen his PCP on Tuesday and nothing has changed with medication and he is doing well today.  Thank you  Kassy  Please send to   Deanne Perrin NP   [address] [place] [address]   Phone: [phone]   Fax: [phone]   NPI: [id number]

## 2025-02-24 ENCOUNTER — OFFICE VISIT (OUTPATIENT)
Dept: CARDIOLOGY | Facility: CLINIC | Age: 60
End: 2025-02-24
Payer: MEDICARE

## 2025-02-24 VITALS
HEART RATE: 73 BPM | HEIGHT: 69 IN | DIASTOLIC BLOOD PRESSURE: 74 MMHG | BODY MASS INDEX: 23.99 KG/M2 | WEIGHT: 162 LBS | SYSTOLIC BLOOD PRESSURE: 127 MMHG

## 2025-02-24 DIAGNOSIS — Z98.61 CAD S/P PERCUTANEOUS CORONARY ANGIOPLASTY: ICD-10-CM

## 2025-02-24 DIAGNOSIS — I21.11 STEMI INVOLVING RIGHT CORONARY ARTERY: Primary | ICD-10-CM

## 2025-02-24 DIAGNOSIS — I48.0 PAROXYSMAL ATRIAL FIBRILLATION: ICD-10-CM

## 2025-02-24 DIAGNOSIS — I25.5 ISCHEMIC CARDIOMYOPATHY: ICD-10-CM

## 2025-02-24 DIAGNOSIS — I25.10 CAD S/P PERCUTANEOUS CORONARY ANGIOPLASTY: ICD-10-CM

## 2025-02-24 DIAGNOSIS — I21.19 ACUTE ST ELEVATION MYOCARDIAL INFARCTION (STEMI) INVOLVING OTHER CORONARY ARTERY OF INFERIOR WALL: ICD-10-CM

## 2025-02-24 RX ORDER — SPIRONOLACTONE 25 MG/1
25 TABLET ORAL DAILY
Qty: 90 TABLET | Refills: 3 | Status: SHIPPED | OUTPATIENT
Start: 2025-02-24

## 2025-02-24 RX ORDER — AMIODARONE HYDROCHLORIDE 200 MG/1
200 TABLET ORAL EVERY 12 HOURS SCHEDULED
Qty: 180 TABLET | Refills: 1 | Status: SHIPPED | OUTPATIENT
Start: 2025-02-24

## 2025-02-24 RX ORDER — SACUBITRIL AND VALSARTAN 24; 26 MG/1; MG/1
1 TABLET, FILM COATED ORAL EVERY 12 HOURS SCHEDULED
Qty: 180 TABLET | Refills: 1 | Status: SHIPPED | OUTPATIENT
Start: 2025-02-24

## 2025-02-24 NOTE — PROGRESS NOTES
[facility] CARDIOLOGY      REASON FOR FOLLOW-UP:  Follow-up STEMI          Chief Complaint   Patient presents with    Heart Problem         Dear Deanne Perrin APRN        History of Present Illness   It was my pleasure to see Chu Peralta in the office today.  He is a 59-year-old male who presented to an outside hospital with a complaint of shortness of breath.  He has a history of COPD and is on oxygen therapy at home.  He has ongoing tobacco abuse.  He ultimately was diagnosed with flu a and possible bacterial pneumonia.  He continued to have shortness of breath at the outside hospital and had originally refused supportive/noninvasive positive pressure ventilation.  He wanted to be a DNR/DNI.  Once the patient was incapacitated by his respiratory distress, the family rescinded his DNI.  He was tried on noninvasive positive pressure ventilation but his condition continued to deteriorate.  He ultimately was intubated and transferred to [facility] for further management.  Initial troponin at outside hospital was 161.  On arrival to [facility] it had risen to 4495.  Cardiology consultation was undertaken at this point.  ECG and bedside echo were performed.  ECG demonstrated acute inferior injury.  Echocardiogram demonstrated cardiomyopathy with wall motion abnormalities in the inferolateral wall.  The decision was made to take patient emergently to cardiac catheterization lab.  He did require intubation and transition to high flow nasal cannula with BiPAP at night.  He was treated with broad-spectrum antibiotics for community-acquired pneumonia.  He required vasopressor support briefly.  He also developed atrial fibrillation that was treated with antiarrhythmics and he converted to sinus rhythm.  He was discharged home on oral amiodarone.  Guideline directed medical therapy was initiated except for spironolactone due to low blood pressures.  He presents today with his wife in  follow-up from that hospitalization.    Today, Mr. Peralta reports that he feels great.  He denies any further chest discomfort.  He stated that the discomfort in his lower extremities has actually subsided as well.  He denies any further chest discomfort.  He has had no shortness of breath, dyspnea on exertion, lower extremity edema, dizziness or lightheadedness.  He has had no abnormal bleeding.  He stated that he has not smoked since discharge and does not feel the urge to do so.  He and his wife had questions that were answered to their satisfaction.    ASSESSMENT:  Acute inferior wall myocardial infarction status post PCI/RCA with residual 60-70% mid circumflex  Acute respiratory failure requiring mechanical ventilation  Severe global LV systolic dysfunction/ischemic dilated cardiomyopathy  Recent flu a positive with pneumonia  COPD  Hypertension  Peripheral arterial disease  Paroxysmal atrial fibrillation      PLAN:  Anticoagulation deemed not necessary unless patient develops A-fib again.  Continue complete smoking cessation  I will send refills for his medications today and add Aldactone 25 mg daily.  Continue risk factor modification including heart healthy diet, activity as tolerated, healthy weight  Cardiac rehab phase 2 order placed  30-day event monitor ordered to evaluate for any further A-fib-not placed at the time of discharge  Continue to uptitrate GDMT as blood pressure/heart rate allow  Reecho after 90 days  Follow-up with primary cardiologist in 3 months      CHF Guideline Directed Medical Therapy  Beta Blocker:   ARNI/ACE/ARB:   SGLT 2 inhibitors:   Diuretics:   Aldosterone Antagonist:   Vasodilators & Nitrates:       Diagnoses and all orders for this visit:    1. STEMI involving right coronary artery (Primary)  -     Cardiac Rehab Phase II; Future  -     Cardiac Event Monitor (DAMON) or Mobile Cardiac Outpatient Telemetry (MCT); Future    2. Paroxysmal atrial fibrillation  -     Cardiac Event  Monitor (DAMON) or Mobile Cardiac Outpatient Telemetry (MCT); Future    3. Ischemic cardiomyopathy    4. Acute ST elevation myocardial infarction (STEMI) involving other coronary artery of inferior wall    5. CAD S/P percutaneous coronary angioplasty    Other orders  -     amiodarone (PACERONE) 200 MG tablet; Take 1 tablet by mouth Every 12 (Twelve) Hours. Indications: Supraventricular Cardiac Arrhythmia  Dispense: 180 tablet; Refill: 1  -     empagliflozin (JARDIANCE) 10 MG tablet tablet; Take 1 tablet by mouth Daily. Indications: Type 2 Diabetes  Dispense: 90 tablet; Refill: 3  -     sacubitril-valsartan (ENTRESTO) 24-26 MG tablet; Take 1 tablet by mouth Every 12 (Twelve) Hours. Indications: Cardiac Failure  Dispense: 180 tablet; Refill: 1  -     ticagrelor (BRILINTA) 90 MG tablet tablet; Take 1 tablet by mouth 2 (Two) Times a Day. Indications: Heart Attack  Dispense: 180 tablet; Refill: 3  -     spironolactone (ALDACTONE) 25 MG tablet; Take 1 tablet by mouth Daily. Indications: Heart Attack  Dispense: 90 tablet; Refill: 3          The following portions of the patient's history were reviewed and updated as appropriate: allergies, current medications, past family history, past medical history, past social history, past surgical history, and problem list.    REVIEW OF SYSTEMS:    Review of Systems   All other systems reviewed and are negative.      Vitals:    02/24/25 1114   BP: 127/74   Pulse: 73         PHYSICAL EXAM:    General: Alert, cooperative, no distress, appears stated age  Head:  Normocephalic, atraumatic, mucous membranes moist  Eyes:  Conjunctiva/corneas clear, EOM's intact     Neck:  Supple,  no JVD or bruit     Lungs: Clear to auscultation bilaterally, no wheezes rhonchi rales are noted  Chest wall: No tenderness  Musculoskeletal:   Ambulates freely without assistance  Heart::  Regular rate and rhythm, S1 and S2 normal, no murmur, rub or gallop  Abdomen: Soft, non-tender, nondistended, bowel sounds  active, no abdominal bruit  Extremities: No cyanosis, clubbing, or edema   Pulses: 2+ and symmetric all extremities  Skin:  No rashes or lesions  Neuro/psych: A&O x3. CN II through XII are grossly intact with appropriate affect        Past Medical History:   Diagnosis Date    Anxiety     COPD (chronic obstructive pulmonary disease)     Depression     Emphysema lung     Hypertension     Lung nodule     Restless leg        Past Surgical History:   Procedure Laterality Date    CARDIAC CATHETERIZATION N/A 2/3/2025    Procedure: Left Heart Cath;  Surgeon: Dale Peterson DO;  Location:  [facility] CATH INVASIVE LOCATION;  Service: Cardiovascular;  Laterality: N/A;    CARDIAC CATHETERIZATION N/A 2/3/2025    Procedure: Percutaneous Coronary Intervention;  Surgeon: Dale Peterson DO;  Location:  [facility] CATH INVASIVE LOCATION;  Service: Cardiovascular;  Laterality: N/A;    CARDIAC CATHETERIZATION N/A 2/3/2025    Procedure: Stent SHELLY coronary;  Surgeon: Dale Peterson DO;  Location:  [facility] CATH INVASIVE LOCATION;  Service: Cardiovascular;  Laterality: N/A;  RCA         Current Outpatient Medications:     albuterol sulfate  (90 Base) MCG/ACT inhaler, Inhale 2 puffs Every 4 (Four) Hours As Needed for Wheezing. Indications: ACUTE EXACERBATION OF COPD (INACTIVE), Disp: , Rfl:     amiodarone (PACERONE) 200 MG tablet, Take 1 tablet by mouth Every 12 (Twelve) Hours. Indications: Supraventricular Cardiac Arrhythmia, Disp: 180 tablet, Rfl: 1    aspirin 81 MG EC tablet, Take 1 tablet by mouth Daily., Disp: 30 tablet, Rfl: 0    atorvastatin (LIPITOR) 40 MG tablet, Take 1 tablet by mouth Every Night., Disp: 90 tablet, Rfl: 3    empagliflozin (JARDIANCE) 10 MG tablet tablet, Take 1 tablet by mouth Daily. Indications: Type 2 Diabetes, Disp: 90 tablet, Rfl: 3    escitalopram (LEXAPRO) 20 MG tablet, TAKE 1 TABLET BY MOUTH ONCE DAILY, Disp: 90 tablet, Rfl: 1    Fluticasone-Umeclidin-Vilant (Trelegy  "Ellipta) 100-62.5-25 MCG/ACT inhaler, Inhale 1 puff Daily. Indications: Chronic Obstructive Lung Disease, Disp: , Rfl:     metoprolol succinate XL (TOPROL-XL) 25 MG 24 hr tablet, Take 1 tablet by mouth Daily. Indications: High Blood Pressure, Disp: , Rfl:     Misc. Devices (Rollator Ultra-Light) misc, Use 1 each Daily., Disp: 1 each, Rfl: 0    O2 (OXYGEN), Inhale 2 L/min Continuous. Indications: Worsening of Chronic Obstructive Lung Disease, Disp: , Rfl:     sacubitril-valsartan (ENTRESTO) 24-26 MG tablet, Take 1 tablet by mouth Every 12 (Twelve) Hours. Indications: Cardiac Failure, Disp: 180 tablet, Rfl: 1    theophylline (UNIPHYL) 400 MG 24 hr tablet, Take 1 tablet by mouth Daily. Indications: Reversible Disease of Blockage in Breathing Passages, Disp: , Rfl:     ticagrelor (BRILINTA) 90 MG tablet tablet, Take 1 tablet by mouth 2 (Two) Times a Day. Indications: Heart Attack, Disp: 180 tablet, Rfl: 3    Fluticasone Furoate-Vilanterol (Breo Ellipta) 100-25 MCG/INH inhaler, Inhale 1 puff Daily., Disp: 1 inhaler, Rfl: 0    spironolactone (ALDACTONE) 25 MG tablet, Take 1 tablet by mouth Daily. Indications: Heart Attack, Disp: 90 tablet, Rfl: 3    Allergies   Allergen Reactions    Coconut Unknown - High Severity    Keflex [Cephalexin] Unknown - Low Severity       History reviewed. No pertinent family history.    Social History     Tobacco Use    Smoking status: Former     Current packs/day: 0.66     Types: Cigarettes    Smokeless tobacco: Not on file   Substance Use Topics    Alcohol use: Not on file           Current Electrocardiogram:    ECG 12 Lead    Date/Time: 2/24/2025 1:55 PM  Performed by: Mercedes Kelsey APRN    Authorized by: Mercedes Kelsey APRN  Comparison: compared with previous ECG from 2/14/2025  Similar to previous ECG  Rhythm: sinus rhythm  BPM: 79  Conduction: right bundle branch block  Q waves: II, III and aVF                EMR Dragon/Transcription:   \"Dictated utilizing Dragon " "dictation\".     Copied text in this note has been reviewed by me and is accurate as of 02/24/25.    "

## 2025-02-25 ENCOUNTER — READMISSION MANAGEMENT (OUTPATIENT)
Dept: CALL CENTER | Facility: HOSPITAL | Age: 60
End: 2025-02-25
Payer: MEDICARE

## 2025-02-25 NOTE — OUTREACH NOTE
AMI Week 2 Survey      Flowsheet Row Responses   [facility]   Does the patient have one of the following disease processes/diagnoses(primary or secondary)? Acute MI (STEMI,NSTEMI)   Week 2 attempt successful? No   Unsuccessful attempts Attempt 1            Shirlene DEMPSEY - Registered Nurse

## 2025-02-26 ENCOUNTER — HOME CARE VISIT (OUTPATIENT)
Dept: HOME HEALTH SERVICES | Facility: HOME HEALTHCARE | Age: 60
End: 2025-02-26
Payer: MEDICARE

## 2025-02-26 VITALS
DIASTOLIC BLOOD PRESSURE: 68 MMHG | TEMPERATURE: 98.3 F | RESPIRATION RATE: 16 BRPM | WEIGHT: 162 LBS | OXYGEN SATURATION: 88 % | SYSTOLIC BLOOD PRESSURE: 120 MMHG | HEART RATE: 100 BPM | BODY MASS INDEX: 23.92 KG/M2

## 2025-02-26 PROCEDURE — G0493 RN CARE EA 15 MIN HH/HOSPICE: HCPCS

## 2025-02-27 ENCOUNTER — HOSPITAL ENCOUNTER (OUTPATIENT)
Dept: CARDIOLOGY | Facility: HOSPITAL | Age: 60
Discharge: HOME | End: 2025-02-27
Payer: MEDICARE

## 2025-02-27 DIAGNOSIS — I48.0 PAROXYSMAL ATRIAL FIBRILLATION: ICD-10-CM

## 2025-02-27 DIAGNOSIS — I21.11 STEMI INVOLVING RIGHT CORONARY ARTERY: ICD-10-CM

## 2025-02-28 ENCOUNTER — HOME CARE VISIT (OUTPATIENT)
Dept: HOME HEALTH SERVICES | Facility: HOME HEALTHCARE | Age: 60
End: 2025-02-28
Payer: MEDICARE

## 2025-02-28 VITALS
RESPIRATION RATE: 18 BRPM | HEART RATE: 76 BPM | BODY MASS INDEX: 23.92 KG/M2 | TEMPERATURE: 98.1 F | DIASTOLIC BLOOD PRESSURE: 76 MMHG | WEIGHT: 162 LBS | OXYGEN SATURATION: 98 % | SYSTOLIC BLOOD PRESSURE: 120 MMHG

## 2025-02-28 PROCEDURE — G0495 RN CARE TRAIN/EDU IN HH: HCPCS

## 2025-03-05 ENCOUNTER — HOME CARE VISIT (OUTPATIENT)
Dept: HOME HEALTH SERVICES | Facility: HOME HEALTHCARE | Age: 60
End: 2025-03-05
Payer: MEDICARE

## 2025-03-05 VITALS
HEART RATE: 74 BPM | SYSTOLIC BLOOD PRESSURE: 112 MMHG | BODY MASS INDEX: 23.63 KG/M2 | OXYGEN SATURATION: 98 % | WEIGHT: 160 LBS | RESPIRATION RATE: 12 BRPM | DIASTOLIC BLOOD PRESSURE: 62 MMHG | TEMPERATURE: 97.4 F

## 2025-03-05 PROCEDURE — G0162 HHC RN E&M PLAN SVS, 15 MIN: HCPCS

## 2025-03-11 ENCOUNTER — HOME CARE VISIT (OUTPATIENT)
Dept: HOME HEALTH SERVICES | Facility: HOME HEALTHCARE | Age: 60
End: 2025-03-11
Payer: MEDICARE

## 2025-03-11 VITALS
RESPIRATION RATE: 12 BRPM | OXYGEN SATURATION: 97 % | SYSTOLIC BLOOD PRESSURE: 120 MMHG | DIASTOLIC BLOOD PRESSURE: 72 MMHG | HEART RATE: 84 BPM | TEMPERATURE: 97.9 F

## 2025-03-11 PROCEDURE — G0162 HHC RN E&M PLAN SVS, 15 MIN: HCPCS

## 2025-03-18 ENCOUNTER — HOME CARE VISIT (OUTPATIENT)
Dept: HOME HEALTH SERVICES | Facility: HOME HEALTHCARE | Age: 60
End: 2025-03-18
Payer: MEDICARE

## 2025-03-18 VITALS
RESPIRATION RATE: 12 BRPM | DIASTOLIC BLOOD PRESSURE: 66 MMHG | TEMPERATURE: 97.3 F | HEART RATE: 50 BPM | OXYGEN SATURATION: 99 % | SYSTOLIC BLOOD PRESSURE: 108 MMHG

## 2025-03-18 PROCEDURE — G0162 HHC RN E&M PLAN SVS, 15 MIN: HCPCS

## 2025-03-18 NOTE — HOME HEALTH
CT scan chest.  Dr. Gaona.  CPAP adjustment.  Not sure.  Shortness of breath improved.    Sleeping in oxygen.  RLS.  Cramps in legs

## 2025-03-25 ENCOUNTER — HOME CARE VISIT (OUTPATIENT)
Dept: HOME HEALTH SERVICES | Facility: HOME HEALTHCARE | Age: 60
End: 2025-03-25
Payer: MEDICARE

## 2025-03-25 VITALS
SYSTOLIC BLOOD PRESSURE: 106 MMHG | HEART RATE: 83 BPM | DIASTOLIC BLOOD PRESSURE: 62 MMHG | TEMPERATURE: 97.7 F | RESPIRATION RATE: 12 BRPM | OXYGEN SATURATION: 95 %

## 2025-03-25 PROCEDURE — G0162 HHC RN E&M PLAN SVS, 15 MIN: HCPCS

## 2025-04-23 LAB
CV ZIO BASELINE AVG BPM: 74
CV ZIO BASELINE BPM HIGH: 114
CV ZIO BASELINE BPM LOW: 53

## 2025-06-02 RX ORDER — AMIODARONE HYDROCHLORIDE 200 MG/1
200 TABLET ORAL EVERY 12 HOURS SCHEDULED
Qty: 180 TABLET | Refills: 1 | Status: SHIPPED | OUTPATIENT
Start: 2025-06-02

## 2025-06-04 ENCOUNTER — OFFICE VISIT (OUTPATIENT)
Dept: CARDIOLOGY | Facility: CLINIC | Age: 60
End: 2025-06-04
Payer: MEDICARE

## 2025-06-04 VITALS
BODY MASS INDEX: 24.51 KG/M2 | SYSTOLIC BLOOD PRESSURE: 128 MMHG | OXYGEN SATURATION: 96 % | WEIGHT: 166 LBS | HEART RATE: 81 BPM | DIASTOLIC BLOOD PRESSURE: 75 MMHG

## 2025-06-04 DIAGNOSIS — I25.5 ISCHEMIC CARDIOMYOPATHY: ICD-10-CM

## 2025-06-04 DIAGNOSIS — Z79.02 LONG TERM (CURRENT) USE OF ANTITHROMBOTICS/ANTIPLATELETS: ICD-10-CM

## 2025-06-04 DIAGNOSIS — I21.11 STEMI INVOLVING RIGHT CORONARY ARTERY: Primary | ICD-10-CM

## 2025-06-04 RX ORDER — LOSARTAN POTASSIUM 25 MG/1
25 TABLET ORAL DAILY
COMMUNITY

## 2025-06-04 RX ORDER — CLOPIDOGREL BISULFATE 75 MG/1
75 TABLET ORAL DAILY
COMMUNITY

## 2025-06-04 NOTE — PROGRESS NOTES
[facility] CARDIOLOGY      REASON FOR FOLLOW-UP:  Follow-up hospitalization          Chief Complaint   Patient presents with    STEMI Right Coronary Artery    Hypertension         Dear Deanne Perrin APRN        History of Present Illness   It was my pleasure to see Chu Peralta in the office today.  He is a 59-year-old male who presented to an outside hospital with a complaint of shortness of breath on 2/3/2025.  He has a history of COPD and is on oxygen therapy at home.  He has ongoing tobacco abuse.  He ultimately was diagnosed with flu a and possible bacterial pneumonia.  He continued to have shortness of breath at the outside hospital and had originally refused supportive/noninvasive positive pressure ventilation.  He wanted to be a DNR/DNI.  Once the patient was incapacitated by his respiratory distress, the family rescinded his DNI.  He was tried on noninvasive positive pressure ventilation but his condition continued to deteriorate.  He ultimately was intubated and transferred to [facility] for further management.  Initial troponin at outside hospital was 161.  On arrival to [facility] it had risen to 4495.  Cardiology consultation was undertaken at this point.  ECG and bedside echo were performed.  ECG demonstrated acute inferior injury.  Echocardiogram demonstrated cardiomyopathy with wall motion abnormalities in the inferolateral wall.  The decision was made to take patient emergently to cardiac catheterization lab.  He did require intubation and transition to high flow nasal cannula with BiPAP at night.  He was treated with broad-spectrum antibiotics for community-acquired pneumonia.  He required vasopressor support briefly.  He also developed atrial fibrillation that was treated with antiarrhythmics and he converted to sinus rhythm.  He was discharged home on oral amiodarone.  Guideline directed medical therapy was initiated except for spironolactone due to low blood  pressures.    The patient was readmitted to [facility] in transfer from [facility] on 5-2-25 for acute exacerbation of COPD.  2D echocardiogram was performed at that facility that showed normalized LV function with EF now 55-60%, no significant valvular pathology reported.    Labs reviewed from that hospitalization: Lipid values within goal, high-sensitivity troponin negative, renal function unremarkable    Patient tells me today that his breathing has improved.  He denies any chest discomfort, palpitations, dizziness or lightheadedness.  He stated that he has quit smoking completely.  He denies any abnormal bleeding.  The patient did ask if he would be on cardiac medications long-term because he has a history of illicit drug use and does not like to take any more medicine than needed.  I did reassure him that the medications he has been prescribed are very important for maintaining good LV function and protecting his cardiac stent.  He verbalized understanding.  The patient had been discharged from [facility] with mobile cardiac telemetry to evaluate for any further atrial fibrillation and interpretation summary reviewed-relatively benign study with rare PAC/PVC.  No sustained atrial or ventricular arrhythmia.  6 brief episodes of PSVT with longest episode of 9 beats.  Results of diagnostics were reviewed with the patient and questions were answered.  His only complaint today is weakness in his right hand and inability to fully extend his fingers.  He denies any injury.      ASSESSMENT:  Acute inferior wall myocardial infarction  Acute on chronic respiratory failure requiring mechanical ventilation  Recent hospitalization with flu a positive  COPD-recent smoking cessation  Hypertension  Peripheral arterial disease    PLAN:  Continue uninterrupted dual antiplatelet therapy consisting of aspirin 81 mg daily and ticagrelor 90 mg twice daily.  Continue GDMT as below.  Patient tells me he has quit  smoking-this was reinforced.  Risk factor modification including heart healthy diet, activity as tolerated, healthy weight.  Patient needs close follow-up with cardiology and primary.  Follow-up with primary care regarding issues with his right hand.  Follow-up with cardiologist as scheduled        CHF Guideline Directed Medical Therapy  Beta Blocker:   ARNI/ACE/ARB:   SGLT 2 inhibitors:   Diuretics:   Aldosterone Antagonist:   Vasodilators & Nitrates:       Diagnoses and all orders for this visit:    1. STEMI involving right coronary artery (Primary)    2. Long term (current) use of antithrombotics/antiplatelets    3. Ischemic cardiomyopathy          The following portions of the patient's history were reviewed and updated as appropriate: allergies, current medications, past family history, past medical history, past social history, past surgical history, and problem list.    REVIEW OF SYSTEMS:    Review of Systems   Cardiovascular:  Positive for dyspnea on exertion.   Respiratory:  Positive for shortness of breath.    All other systems reviewed and are negative.      Vitals:    06/04/25 1430   BP: 128/75   Pulse: 81   SpO2: 96%         PHYSICAL EXAM:    General: Alert, cooperative, no distress, appears stated age  Head:  Normocephalic, atraumatic, mucous membranes moist  Eyes:  Conjunctiva/corneas clear, EOM's intact     Neck:  Supple,  no JVD or bruit     Lungs: Very diminished bilaterally with crackles in bases, no wheezes or rails  Chest wall: No tenderness  Musculoskeletal:   Ambulates freely without assistance  Heart::  Regular rate and rhythm, S1 and S2 normal, no murmur, rub or gallop  Abdomen: Soft, non-tender, nondistended, bowel sounds active, no abdominal bruit  Extremities: No cyanosis, clubbing, or edema   Pulses: 2+ and symmetric all extremities  Skin:  No rashes or lesions  Neuro/psych: A&O x3. CN II through XII are grossly intact with appropriate affect        Past Medical History:   Diagnosis Date     Anxiety     COPD (chronic obstructive pulmonary disease)     Depression     Emphysema lung     Hypertension     Lung nodule     Restless leg        Past Surgical History:   Procedure Laterality Date    CARDIAC CATHETERIZATION N/A 2/3/2025    Procedure: Left Heart Cath;  Surgeon: Dale Peterson DO;  Location:  [facility] CATH INVASIVE LOCATION;  Service: Cardiovascular;  Laterality: N/A;    CARDIAC CATHETERIZATION N/A 2/3/2025    Procedure: Percutaneous Coronary Intervention;  Surgeon: Dale Peterson DO;  Location: [facility] CATH INVASIVE LOCATION;  Service: Cardiovascular;  Laterality: N/A;    CARDIAC CATHETERIZATION N/A 2/3/2025    Procedure: Stent SHELLY coronary;  Surgeon: Dale Peterson DO;  Location:  [facility] CATH INVASIVE LOCATION;  Service: Cardiovascular;  Laterality: N/A;  RCA         Current Outpatient Medications:     albuterol sulfate  (90 Base) MCG/ACT inhaler, Inhale 2 puffs Every 4 (Four) Hours As Needed for Wheezing. Indications: ACUTE EXACERBATION OF COPD (INACTIVE), Disp: , Rfl:     aspirin 81 MG EC tablet, Take 1 tablet by mouth Daily., Disp: 30 tablet, Rfl: 0    atorvastatin (LIPITOR) 40 MG tablet, Take 1 tablet by mouth Every Night., Disp: 90 tablet, Rfl: 3    clopidogrel (PLAVIX) 75 MG tablet, Take 1 tablet by mouth Daily., Disp: , Rfl:     empagliflozin (JARDIANCE) 10 MG tablet tablet, Take 1 tablet by mouth Daily. Indications: Type 2 Diabetes, Disp: 90 tablet, Rfl: 3    Fluticasone-Umeclidin-Vilant (Trelegy Ellipta) 100-62.5-25 MCG/ACT inhaler, Inhale 1 puff Daily. Indications: Chronic Obstructive Lung Disease, Disp: , Rfl:     ipratropium-albuterol (DUO-NEB) 0.5-2.5 mg/3 ml nebulizer, Take 3 mL by nebulization Every 4 (Four) Hours As Needed for Shortness of Air. Indications: Chronic Obstructive Lung Disease, Disp: , Rfl:     losartan (COZAAR) 25 MG tablet, Take 1 tablet by mouth Daily., Disp: , Rfl:     metoprolol succinate XL (TOPROL-XL) 25 MG 24 hr  "tablet, Take 1 tablet by mouth Daily. Indications: High Blood Pressure, Disp: , Rfl:     Misc. Devices (Rollator Ultra-Light) misc, Use 1 each Daily., Disp: 1 each, Rfl: 0    sacubitril-valsartan (ENTRESTO) 24-26 MG tablet, Take 1 tablet by mouth Every 12 (Twelve) Hours. Indications: Cardiac Failure, Disp: 180 tablet, Rfl: 1    spironolactone (ALDACTONE) 25 MG tablet, Take 1 tablet by mouth Daily. Indications: Heart Attack, Disp: 90 tablet, Rfl: 3    theophylline (UNIPHYL) 400 MG 24 hr tablet, Take 1 tablet by mouth Daily. Indications: Reversible Disease of Blockage in Breathing Passages, Disp: , Rfl:     ticagrelor (BRILINTA) 90 MG tablet tablet, Take 1 tablet by mouth 2 (Two) Times a Day. Indications: Heart Attack, Disp: 180 tablet, Rfl: 3    amiodarone (PACERONE) 200 MG tablet, TAKE 1 TABLET BY MOUTH EVERY 12 HOURS, Disp: 180 tablet, Rfl: 1    Allergies   Allergen Reactions    Coconut Unknown - High Severity    Keflex [Cephalexin] Unknown - Low Severity       History reviewed. No pertinent family history.    Social History     Tobacco Use    Smoking status: Former     Current packs/day: 0.66     Types: Cigarettes    Smokeless tobacco: Never   Substance Use Topics    Alcohol use: Defer           Current Electrocardiogram:  Procedures        EMR Dragon/Transcription:   \"Dictated utilizing Dragon dictation\".     Copied text in this note has been reviewed by me and is accurate as of 06/05/25.    "

## 2025-06-05 RX ORDER — SACUBITRIL AND VALSARTAN 24; 26 MG/1; MG/1
1 TABLET, FILM COATED ORAL EVERY 12 HOURS SCHEDULED
Qty: 180 TABLET | Refills: 0 | Status: SHIPPED | OUTPATIENT
Start: 2025-06-05

## 2025-06-11 RX ORDER — LOSARTAN POTASSIUM 25 MG/1
25 TABLET ORAL DAILY
Qty: 30 TABLET | Refills: 1 | Status: SHIPPED | OUTPATIENT
Start: 2025-06-11 | End: 2025-06-11 | Stop reason: SDUPTHER

## 2025-06-11 RX ORDER — CLOPIDOGREL BISULFATE 75 MG/1
75 TABLET ORAL DAILY
Qty: 30 TABLET | Refills: 1 | Status: SHIPPED | OUTPATIENT
Start: 2025-06-11 | End: 2025-06-11 | Stop reason: SDUPTHER

## 2025-06-12 RX ORDER — LOSARTAN POTASSIUM 25 MG/1
25 TABLET ORAL DAILY
Qty: 30 TABLET | Refills: 1 | Status: SHIPPED | OUTPATIENT
Start: 2025-06-12

## 2025-06-12 RX ORDER — CLOPIDOGREL BISULFATE 75 MG/1
75 TABLET ORAL DAILY
Qty: 30 TABLET | Refills: 1 | Status: SHIPPED | OUTPATIENT
Start: 2025-06-12

## (undated) DEVICE — GUIDE CATHETER: Brand: MACH1™

## (undated) DEVICE — PINNACLE INTRODUCER SHEATH: Brand: PINNACLE

## (undated) DEVICE — BOWL PLSTC MD 16OZ BLU STRL

## (undated) DEVICE — DGW .035 FC J3MM 150CM TEF HEP: Brand: EMERALD

## (undated) DEVICE — ELECTRD DEFIB M/FUNC PROPADZ RADIOL 2PK

## (undated) DEVICE — CATH DIAG IMPULSE FL4 6F 100CM

## (undated) DEVICE — CATH DIAG IMPULSE FR4 6F 100CM

## (undated) DEVICE — PK TRY HEART CATH 50

## (undated) DEVICE — DEV INFL COMPAK W/ACCESSPLUS IN4530

## (undated) DEVICE — STPCK 3WY HP ROT

## (undated) DEVICE — CATH DIAG IMPULSE PIG .056 6F 110CM

## (undated) DEVICE — SYR LL TP 10ML STRL

## (undated) DEVICE — NC TREK NEO™ CORONARY DILATATION CATHETER 2.50 X 20 MM / RAPID-EXCHANGE: Brand: NC TREK NEO™

## (undated) DEVICE — ST ACC MICROPUNCTURE STFF/CANN PLAT/TP 4F 21G 40CM

## (undated) DEVICE — CONTRST ISOVUE300 61PCT 50ML

## (undated) DEVICE — Device: Brand: PROWATERFLEX

## (undated) DEVICE — MEDICINE CUP, GRADUATED, STER: Brand: MEDLINE

## (undated) DEVICE — TBG NAMIC PRESS MONTR A/ F/M 12IN